# Patient Record
Sex: MALE | Race: WHITE | NOT HISPANIC OR LATINO | Employment: OTHER | URBAN - NONMETROPOLITAN AREA
[De-identification: names, ages, dates, MRNs, and addresses within clinical notes are randomized per-mention and may not be internally consistent; named-entity substitution may affect disease eponyms.]

---

## 2017-03-07 ENCOUNTER — TRANSCRIBE ORDERS (OUTPATIENT)
Dept: ADMINISTRATIVE | Facility: HOSPITAL | Age: 67
End: 2017-03-07

## 2017-03-07 DIAGNOSIS — M25.511 ACUTE PAIN OF RIGHT SHOULDER: Primary | ICD-10-CM

## 2017-03-09 ENCOUNTER — HOSPITAL ENCOUNTER (OUTPATIENT)
Dept: CT IMAGING | Facility: HOSPITAL | Age: 67
Discharge: HOME | End: 2017-03-09
Admitting: NURSE PRACTITIONER

## 2017-03-09 DIAGNOSIS — M25.511 ACUTE PAIN OF RIGHT SHOULDER: ICD-10-CM

## 2017-03-09 PROCEDURE — 73200 CT UPPER EXTREMITY W/O DYE: CPT

## 2017-03-27 ENCOUNTER — HOSPITAL ENCOUNTER (OUTPATIENT)
Dept: CT IMAGING | Age: 67
Discharge: HOME | End: 2017-03-27
Payer: MEDICARE

## 2017-03-27 ENCOUNTER — HOSPITAL ENCOUNTER (OUTPATIENT)
Dept: GENERAL RADIOLOGY | Age: 67
Discharge: HOME | End: 2017-03-27
Payer: MEDICARE

## 2017-03-27 DIAGNOSIS — Z98.1 ARTHRODESIS STATUS: ICD-10-CM

## 2017-03-27 DIAGNOSIS — M25.511 PAIN IN SHOULDER REGION, RIGHT: ICD-10-CM

## 2017-03-31 ENCOUNTER — HOSPITAL ENCOUNTER (OUTPATIENT)
Dept: GENERAL RADIOLOGY | Age: 67
Discharge: HOME | End: 2017-03-31
Payer: MEDICARE

## 2017-03-31 ENCOUNTER — HOSPITAL ENCOUNTER (OUTPATIENT)
Dept: CT IMAGING | Age: 67
Discharge: HOME | End: 2017-03-31
Payer: MEDICARE

## 2017-03-31 DIAGNOSIS — M25.511 ACUTE PAIN OF RIGHT SHOULDER: ICD-10-CM

## 2017-03-31 DIAGNOSIS — M25.511 RIGHT SHOULDER PAIN, UNSPECIFIED CHRONICITY: ICD-10-CM

## 2017-03-31 PROCEDURE — 77002 NEEDLE LOCALIZATION BY XRAY: CPT

## 2017-03-31 PROCEDURE — 23350 INJECTION FOR SHOULDER X-RAY: CPT

## 2017-03-31 PROCEDURE — 73200 CT UPPER EXTREMITY W/O DYE: CPT

## 2017-05-16 ENCOUNTER — TRANSCRIBE ORDERS (OUTPATIENT)
Dept: PHYSICAL THERAPY | Facility: HOSPITAL | Age: 67
End: 2017-05-16

## 2017-05-16 DIAGNOSIS — M19.011 PRIMARY OSTEOARTHRITIS OF RIGHT SHOULDER: ICD-10-CM

## 2017-05-16 DIAGNOSIS — M75.21 BICIPITAL TENDINITIS OF RIGHT SHOULDER: Primary | ICD-10-CM

## 2017-05-24 ENCOUNTER — HOSPITAL ENCOUNTER (OUTPATIENT)
Dept: PHYSICAL THERAPY | Facility: HOSPITAL | Age: 67
Setting detail: THERAPIES SERIES
Discharge: HOME | End: 2017-05-24

## 2017-05-24 DIAGNOSIS — M19.011 PRIMARY OSTEOARTHRITIS, RIGHT SHOULDER: ICD-10-CM

## 2017-05-24 DIAGNOSIS — M75.21 BICEPS TENDINITIS OF RIGHT SHOULDER: Primary | ICD-10-CM

## 2017-05-24 PROCEDURE — 97110 THERAPEUTIC EXERCISES: CPT | Performed by: PHYSICAL THERAPIST

## 2017-05-24 PROCEDURE — G8984 CARRY CURRENT STATUS: HCPCS | Performed by: PHYSICAL THERAPIST

## 2017-05-24 PROCEDURE — G8985 CARRY GOAL STATUS: HCPCS | Performed by: PHYSICAL THERAPIST

## 2017-05-24 PROCEDURE — 97161 PT EVAL LOW COMPLEX 20 MIN: CPT | Performed by: PHYSICAL THERAPIST

## 2017-05-30 ENCOUNTER — HOSPITAL ENCOUNTER (OUTPATIENT)
Dept: PHYSICAL THERAPY | Facility: HOSPITAL | Age: 67
Setting detail: THERAPIES SERIES
Discharge: HOME | End: 2017-05-30

## 2017-05-30 DIAGNOSIS — M75.21 BICEPS TENDINITIS OF RIGHT SHOULDER: Primary | ICD-10-CM

## 2017-05-30 DIAGNOSIS — M19.011 PRIMARY OSTEOARTHRITIS, RIGHT SHOULDER: ICD-10-CM

## 2017-05-30 PROCEDURE — 97110 THERAPEUTIC EXERCISES: CPT | Performed by: PHYSICAL THERAPIST

## 2017-05-30 PROCEDURE — 97140 MANUAL THERAPY 1/> REGIONS: CPT | Performed by: PHYSICAL THERAPIST

## 2017-06-01 ENCOUNTER — HOSPITAL ENCOUNTER (OUTPATIENT)
Dept: PHYSICAL THERAPY | Facility: HOSPITAL | Age: 67
Setting detail: THERAPIES SERIES
Discharge: HOME | End: 2017-06-01

## 2017-06-01 DIAGNOSIS — M19.011 PRIMARY OSTEOARTHRITIS, RIGHT SHOULDER: ICD-10-CM

## 2017-06-01 DIAGNOSIS — M75.21 BICEPS TENDINITIS OF RIGHT SHOULDER: Primary | ICD-10-CM

## 2017-06-01 PROCEDURE — 97110 THERAPEUTIC EXERCISES: CPT

## 2017-06-01 NOTE — THERAPY TREATMENT NOTE
Outpatient Physical Therapy Ortho Treatment Note  [facility]  Julia Reyes PTA  17  4:37 PM       Patient Name: Emerson Sofia  : 1950  MRN: [id number]  Today's Date: 2017      Visit Date: 2017  Subjective Improvement: 0%      Attendance: 3/3  Approved: medicare cap           MD follow up: 5 weeks            RC date:     17    Visit Dx:    ICD-10-CM ICD-9-CM   1. Biceps tendinitis of right shoulder M75.21 726.12   2. Primary osteoarthritis, right shoulder M19.011 715.11       There is no problem list on file for this patient.       Past Medical History:   Diagnosis Date   • High cholesterol         Past Surgical History:   Procedure Laterality Date   • PACEMAKER IMPLANTATION               PT Ortho       17 1600    Right Shoulder    Flexion AROM Deficit 162°  -ARA      17 1000    Precautions and Contraindications    Contraindications Pacemaker: No IFC  -DD    Subjective Pain    Post-Treatment Pain Level 3  -DD      User Key  (r) = Recorded By, (t) = Taken By, (c) = Cosigned By    Initials Name Provider Type    DD Daphnie Smith, PT Physical Therapist    ARA Julia Reyes PTA Physical Therapy Assistant                            PT Assessment/Plan       17 1600       PT Assessment    Functional Limitations Limitation in home management;Performance in self-care ADL;Performance in leisure activities  -     Impairments Range of motion;Muscle strength;Pain;Posture  -     Assessment Comments pt tolerated joint mobes well. Some pain at end ranges with PROM today.   -ARA     Rehab Potential Excellent  -     Patient/caregiver participated in establishment of treatment plan and goals Yes  -ARA     Patient would benefit from skilled therapy intervention Yes  -ARA     PT Plan    PT Frequency 2x/week  -ARA     Predicted Duration of Therapy Intervention (days/wks) 3 weeks  -ARA     PT Plan Comments Add Mid rows and shoulder ext to HEP next visit.   -ARA       User Key  (r) = Recorded By, (t) = Taken By, (c) = Cosigned By    Initials Name Provider Type    ARA ReddyJulia KYLER ANALIA Reyes Physical Therapy Assistant                Modalities       06/01/17 1600          Ice    Ice Applied Yes  -ARA      Location R Shoulder  -ARA      Rx Minutes 15 mins  -ARA      Ice S/P Rx Yes  -ARA        User Key  (r) = Recorded By, (t) = Taken By, (c) = Cosigned By    Initials Name Provider Type    ARA Julia CHÁVEZ ANALIA Reyes Physical Therapy Assistant                Exercises       06/01/17 1600          Subjective Comments    Subjective Comments pt reports that he doesn't have a problem with his motion at this time- his shoulder just hurts when he moves it a certain way.  -ARA      Subjective Pain    Able to rate subjective pain? yes  -ARA      Pre-Treatment Pain Level 0  -ARA      Post-Treatment Pain Level 0  -ARA      Aquatics    Aquatics performed? No  -ARA      Exercise 1    Exercise Name 1 PRO II for ROM  -ARA      Resistance 1 --   L25.  -ARA      Time (Minutes) 1 8 min  -ARA      Exercise 2    Exercise Name 2 Pulley's FF/ABD  -ARA      Time (Minutes) 2 2 min each  -ARA      Exercise 3    Exercise Name 3 Wall wipes FF/abd  -ARA      Sets 3 2  -ARA      Reps 3 10  -ARA      Exercise 4    Exercise Name 4 Doorway stretch low  -ARA      Reps 4 3  -ARA      Time (Seconds) 4 30 sec hold  -ARA      Exercise 5    Exercise Name 5 Tband: mid row  -ARA      Equipment 5 Theraband  -ARA      Resistance 5 Red  -ARA      Sets 5 2  -ARA      Reps 5 10  -ARA      Exercise 6    Exercise Name 6 Tband: shoulder extension  -ARA      Equipment 6 Theraband  -ARA      Resistance 6 Red  -ARA      Sets 6 2  -ARA      Reps 6 10  -ARA      Exercise 7    Exercise Name 7 Press downs  -ARA      Sets 7 2  -ARA      Reps 7 10  -ARA      Exercise 8    Exercise Name 8 Shoulder isometrics: Add, flexion, ext  -ARA      Reps 8 10   each  -ARA      Time (Seconds) 8 5 sec hold  -ARA        User Key  (r) = Recorded By, (t) = Taken By, (c) = Cosigned  By    Initials Name Provider Type    ARA Reyes PTA Physical Therapy Assistant                        Manual Rx (last 36 hours)      Manual Treatments       06/01/17 1500          Manual Rx 1    Manual Rx 1 Location R shoulder  -      Manual Rx 1 Type PROM/ GH jt mobes  -      Manual Rx 1 Duration 8 min  -        User Key  (r) = Recorded By, (t) = Taken By, (c) = Cosigned By    Initials Name Provider Type    ARA Reyes PTA Physical Therapy Assistant                PT OP Goals       06/01/17 1600       PT Short Term Goals    STG Date to Achieve 06/07/17  -     STG 1 Indep in HEP  -     STG 1 Progress Ongoing  -     STG 2 Tolerate 45 min treatment session without increased pain  -     STG 2 Progress Ongoing  -     STG 3 Demonstrate R shoulder flex AROM to 165 deg or greater  -     STG 3 Progress Ongoing  -     STG 4 Demonstrate R should abd AROM to 160 deg or greater  -     STG 4 Progress [facility]     Long Term Goals    LTG Date to Achieve 06/14/17  -     LTG 1 Subjectively report 65% improvement or greater  -     LTG 1 Progress Ongoing  -     LTG 2 QuickDASH score to 25% or less  -     LTG 2 Progress Ongoing  -     LTG 3 Demonstrate R shoulder flex/abd MMT to 5/5  -     LTG 3 Progress Ongoing  -     LTG 4 Demonstrate R shoulder ER MMT to 4+/5 or greater  -     LTG 4 Progress Ongoing  -     LTG 5 Demonstrate R  strength 95# or greater  -     LTG 5 Progress Ongoing  -     LTG 6 No pain with full R shoulder flex/abd AROM  -     LTG 6 Progress Ongoing  [facility]     Time Calculation    PT Goal Re-Cert Due Date 06/14/17   Visits: 3/3 MD: 5 weeks  Improvement: 0%  -       User Key  (r) = Recorded By, (t) = Taken By, (c) = Cosigned By    Initials Name Provider Type    ARA Reyes PTA Physical Therapy Assistant                    Time Calculation:   Start Time: 1515  Stop Time: 1620  Time Calculation (min): 65 min  Total Timed Code Minutes-  PT: 50 minute(s)    Therapy Charges for Today     Code Description Service Date Service Provider Modifiers Qty    90888858839 HC PT THER PROC EA 15 MIN 6/1/2017 Julia Reyes, PTA GP 3    44229654291 HC PT THER SUPP EA 15 MIN 6/1/2017 Julia Reyes, PTA GP 1                    Julia Reyes, PTA  6/1/2017

## 2017-06-05 ENCOUNTER — HOSPITAL ENCOUNTER (OUTPATIENT)
Dept: PHYSICAL THERAPY | Facility: HOSPITAL | Age: 67
Setting detail: THERAPIES SERIES
Discharge: HOME | End: 2017-06-05

## 2017-06-05 DIAGNOSIS — M19.011 PRIMARY OSTEOARTHRITIS, RIGHT SHOULDER: ICD-10-CM

## 2017-06-05 DIAGNOSIS — M75.21 BICEPS TENDINITIS OF RIGHT SHOULDER: Primary | ICD-10-CM

## 2017-06-05 PROCEDURE — 97110 THERAPEUTIC EXERCISES: CPT

## 2017-06-05 NOTE — THERAPY TREATMENT NOTE
Outpatient Physical Therapy Ortho Treatment Note  [facility]  Julia Reyes PTA  17  8:52 AM       Patient Name: Emerson Sofia  : 1950  MRN: [id number]  Today's Date: 2017      Visit Date: 2017  Subjective Improvement: 10-15%       Attendance:   Approved: medicare guidelines           MD follow up:             date: 17          Visit Dx:    ICD-10-CM ICD-9-CM   1. Biceps tendinitis of right shoulder M75.21 726.12   2. Primary osteoarthritis, right shoulder M19.011 715.11       There is no problem list on file for this patient.       Past Medical History:   Diagnosis Date   • High cholesterol         Past Surgical History:   Procedure Laterality Date   • PACEMAKER IMPLANTATION               PT Ortho       17 0800    Precautions and Contraindications    Contraindications Pacemaker: No IFC  -ARA    Subjective Pain    Able to rate subjective pain? yes  -ARA    Pre-Treatment Pain Level 1  -ARA    Right Shoulder    Flexion AROM Deficit Supine °  -      User Key  (r) = Recorded By, (t) = Taken By, (c) = Cosigned By    Initials Name Provider Type     Julia Reyes PTA Physical Therapy Assistant                            PT Assessment/Plan       17 0800       PT Assessment    Functional Limitations Limitation in home management;Performance in self-care ADL;Performance in leisure activities  -     Impairments Range of motion;Muscle strength;Pain;Posture  -     Assessment Comments pt limited with PROM IR but nearing full PROM in all other directions. Good form noted with tband exercises- minimal cueing required. Added tband exercises to HEP.  -     Rehab Potential Excellent  -     Patient/caregiver participated in establishment of treatment plan and goals Yes  -ARA     Patient would benefit from skilled therapy intervention Yes  -ARA     PT Plan    PT Frequency 2x/week  -ARA     Predicted Duration of Therapy Intervention  (days/wks) 3 weeks  -ARA     PT Plan Comments No moneys  -ARA       User Key  (r) = Recorded By, (t) = Taken By, (c) = Cosigned By    Initials Name Provider Type    ARA Reyes PTA Physical Therapy Assistant                Modalities       06/05/17 0800          Ice    Ice Applied Yes  -ARA      Location R Shoulder  -ARA      Rx Minutes 15 mins  -ARA      Ice S/P Rx Yes  -ARA        User Key  (r) = Recorded By, (t) = Taken By, (c) = Cosigned By    Initials Name Provider Type    ARA Reyes PTA Physical Therapy Assistant                Exercises       06/05/17 0800          Subjective Comments    Subjective Comments pt reports that he is just having a little pain this morning.  -ARA      Subjective Pain    Able to rate subjective pain? yes  -ARA      Pre-Treatment Pain Level 1  -ARA      Post-Treatment Pain Level 0  -ARA      Aquatics    Aquatics performed? No  -ARA      Exercise 1    Exercise Name 1 PRO II for ROM  -ARA      Resistance 1 --   L3.0.  -ARA      Time (Minutes) 1 10 min  -ARA      Exercise 2    Exercise Name 2 Pulley's FF/ABD  -ARA      Time (Minutes) 2 2 min each  -ARA      Exercise 3    Exercise Name 3 Wall wipes FF/abd  -ARA      Sets 3 2  -ARA      Reps 3 10  -ARA      Exercise 4    Exercise Name 4 Doorway stretch low  -ARA      Reps 4 3  -ARA      Time (Seconds) 4 30 sec hold  -ARA      Exercise 5    Exercise Name 5 Tband: mid row  -ARA      Equipment 5 Theraband  -ARA      Resistance 5 Red  -ARA      Sets 5 2  -ARA      Reps 5 10  -ARA      Exercise 6    Exercise Name 6 Tband: shoulder extension  -ARA      Equipment 6 Theraband  -ARA      Resistance 6 Red  -ARA      Sets 6 2  -ARA      Reps 6 10  -ARA      Exercise 7    Exercise Name 7 Supine Serratus punches  -ARA      Sets 7 2  -ARA      Reps 7 10  -ARA      Exercise 8    Exercise Name 8 Shoulder isometrics: Add, flexion, ext  -ARA      Reps 8 10  -ARA      Exercise 9    Exercise Name 9 Supine AA wand flexion  -ARA      Exercise 10    Exercise Name 10 OH  circles cw/ccw  -ARA      Reps 10 20  -        User Key  (r) = Recorded By, (t) = Taken By, (c) = Cosigned By    Initials Name Provider Type    ARA Reyes PTA Physical Therapy Assistant                        Manual Rx (last 36 hours)      Manual Treatments       06/05/17 0700          Manual Rx 1    Manual Rx 1 Location R shoulder  -ARA      Manual Rx 1 Type PROM/ GH jt mobes  -      Manual Rx 1 Duration 8 min  -        User Key  (r) = Recorded By, (t) = Taken By, (c) = Cosigned By    Initials Name Provider Type    ARA Reyes PTA Physical Therapy Assistant                PT OP Goals       06/05/17 0800       PT Short Term Goals    STG Date to Achieve 06/07/17  -     STG 1 Indep in HEP  -     STG 1 Progress Ongoing  -     STG 2 Tolerate 45 min treatment session without increased pain  -     STG 2 Progress Ongoing  -     STG 3 Demonstrate R shoulder flex AROM to 165 deg or greater  -     STG 3 Progress Ongoing  -     STG 4 Demonstrate R should abd AROM to 160 deg or greater  -     STG 4 [facility]     Long Term Goals    LTG Date to Achieve 06/14/17  -     LTG 1 Subjectively report 65% improvement or greater  -     LTG 1 Progress Ongoing  -     LTG 2 QuickDASH score to 25% or less  -     LTG 2 Progress Ongoing  -     LTG 3 Demonstrate R shoulder flex/abd MMT to 5/5  -     LTG 3 Progress Ongoing  -     LTG 4 Demonstrate R shoulder ER MMT to 4+/5 or greater  -     LTG 4 Progress Ongoing  -     LTG 5 Demonstrate R  strength 95# or greater  -     LTG 5 Progress Ongoing  -     LTG 6 No pain with full R shoulder flex/abd AROM  -     LTG 6 [facility]     Time Calculation    PT Goal Re-Cert Due Date 06/14/17   Visits: 4/4 MD: 5 weeks Improvement: 10-15%  -       User Key  (r) = Recorded By, (t) = Taken By, (c) = Cosigned By    Initials Name Provider Type    ARA Reyes PTA Physical Therapy Assistant                 Therapy Education       06/05/17 0800          Therapy Education    Given HEP;Symptoms/condition management;Pain management  -ARA      Program New   Tband: mid row and shoulder ext  -ARA      How Provided Verbal;Demonstration;Written  -ARA      Provided to Patient  -ARA      Level of Understanding Teach back education performed;Verbalized;Demonstrated  -ARA        User Key  (r) = Recorded By, (t) = Taken By, (c) = Cosigned By    Initials Name Provider Type    ARA Reyes PTA Physical Therapy Assistant                Time Calculation:   Start Time: 0800  Stop Time: 0900  Time Calculation (min): 60 min  Total Timed Code Minutes- PT: 45 minute(s)    Therapy Charges for Today     Code Description Service Date Service Provider Modifiers Qty    51251430700 HC PT THER PROC EA 15 MIN 6/5/2017 Julia Reyes PTA GP 3    76462078048 HC PT THER SUPP EA 15 MIN 6/5/2017 Julia Reyes PTA GP 1                    Julia Reyes PTA  6/5/2017

## 2017-06-08 ENCOUNTER — HOSPITAL ENCOUNTER (OUTPATIENT)
Dept: PHYSICAL THERAPY | Facility: HOSPITAL | Age: 67
Setting detail: THERAPIES SERIES
Discharge: HOME | End: 2017-06-08

## 2017-06-08 DIAGNOSIS — M75.21 BICEPS TENDINITIS OF RIGHT SHOULDER: Primary | ICD-10-CM

## 2017-06-08 DIAGNOSIS — M19.011 PRIMARY OSTEOARTHRITIS, RIGHT SHOULDER: ICD-10-CM

## 2017-06-08 PROCEDURE — 97110 THERAPEUTIC EXERCISES: CPT

## 2017-06-08 PROCEDURE — 97140 MANUAL THERAPY 1/> REGIONS: CPT

## 2017-06-08 NOTE — THERAPY TREATMENT NOTE
Outpatient Physical Therapy Ortho Treatment Note  [facility]     Patient Name: Emerson Sofia  : 1950  MRN: [id number]  Today's Date: 2017      Visit Date: 2017     Subjective Improvement: 15%      Attendance:    Approved: Medicare Cap           MD follow up:             date: 17           Visit Dx:    ICD-10-CM ICD-9-CM   1. Biceps tendinitis of right shoulder M75.21 726.12   2. Primary osteoarthritis, right shoulder M19.011 715.11       There is no problem list on file for this patient.       Past Medical History:   Diagnosis Date   • High cholesterol         Past Surgical History:   Procedure Laterality Date   • PACEMAKER IMPLANTATION               PT Ortho       17 0700    Subjective Comments    Subjective Comments Pt states that he is feeling pretty good this morning, and denies pain.   -MB    Precautions and Contraindications    Contraindications Pacemaker: No IFC  -MB    Subjective Pain    Able to rate subjective pain? yes  -MB    Pre-Treatment Pain Level 0  -MB    Post-Treatment Pain Level 0  -MB    Right Shoulder    Flexion AROM Deficit 158°  -MB    ABduction AROM Deficit 150°  -MB    External Rotation AROM Deficit 55°  -MB    Internal Rotation AROM Deficit T9  -MB      User Key  (r) = Recorded By, (t) = Taken By, (c) = Cosigned By    Initials Name Provider Type    JULIANNA Colin PTA Physical Therapy Assistant                            PT Assessment/Plan       17 0800       PT Assessment    Functional Limitations Limitation in home management;Performance in self-care ADL;Performance in leisure activities  -MB     Impairments Range of motion;Muscle strength;Pain;Posture  -MB     Assessment Comments Pt demos increase in AROM R shoulder flex/abd/IR/ER today. Pt has slight discomfort with No $ but does subside with rest.   -MB     Rehab Potential Excellent  -MB     Patient/caregiver participated in establishment of treatment plan and  goals Yes  -MB     Patient would benefit from skilled therapy intervention Yes  -MB     PT Plan    PT Frequency 2x/week  -MB     Predicted Duration of Therapy Intervention (days/wks) 3 weeks  -MB     PT Plan Comments Continue shoulder/scap strength and stability.   -MB       User Key  (r) = Recorded By, (t) = Taken By, (c) = Cosigned By    Initials Name Provider Type    JULIANNA Colin PTA Physical Therapy Assistant                Modalities       06/08/17 0700          Ice    Ice Applied Yes  -MB      Location R Shoulder  -MB      Rx Minutes 15 mins  -MB      Ice S/P Rx Yes  -MB        User Key  (r) = Recorded By, (t) = Taken By, (c) = Cosigned By    Initials Name Provider Type    JULIANNA Colin PTA Physical Therapy Assistant                Exercises       06/08/17 0700          Subjective Comments    Subjective Comments Pt states that he is feeling pretty good this morning, and denies pain.   -MB      Subjective Pain    Able to rate subjective pain? yes  -MB      Pre-Treatment Pain Level 0  -MB      Post-Treatment Pain Level 0  -MB      Aquatics    Aquatics performed? No  -MB      Exercise 1    Exercise Name 1 PRO II for ROM  -MB      Resistance 1 --   3.5  -MB      Time (Minutes) 1 10 min  -MB      Exercise 2    Exercise Name 2 Pulley's FF/ABD  -MB      Time (Minutes) 2 2 min each  -MB      Exercise 3    Exercise Name 3 Wall ABCs  -MB      Sets 3 1  -MB      Reps 3 1  -MB      Exercise 4    Exercise Name 4 Tband Rows  -MB      Equipment 4 Theraband  -MB      Resistance 4 Red  -MB      Sets 4 2  -MB      Reps 4 15  -MB      Exercise 5    Exercise Name 5 Tband shoulder ext  -MB      Equipment 5 Theraband  -MB      Resistance 5 Red  -MB      Sets 5 2  -MB      Reps 5 10  -MB      Exercise 6    Exercise Name 6 No $  -MB      Equipment 6 Theraband  -MB      Resistance 6 Red  -MB      Sets 6 2  -MB      Reps 6 10  -MB      Exercise 7    Exercise Name 7 Wall pushups   -MB      Sets 7 2  -MB      Reps 7 10   -MB      Exercise 8    Exercise Name 8 Standing Wand Flex/abd  -MB      Sets 8 2  -MB      Reps 8 10  -MB      Exercise 9    Exercise Name 9 Serratus Punch  -MB      Weights/Plates 9 3  -MB      Sets 9 2  -MB      Reps 9 10  -MB        User Key  (r) = Recorded By, (t) = Taken By, (c) = Cosigned By    Initials Name Provider Type    JULIANNA Colin PTA Physical Therapy Assistant                        Manual Rx (last 36 hours)      Manual Treatments       06/08/17 0700          Manual Rx 1    Manual Rx 1 Location R shoulder  -MB      Manual Rx 1 Type PROM/ GH jt mobes  -MB      Manual Rx 1 Duration 8 min  -MB        User Key  (r) = Recorded By, (t) = Taken By, (c) = Cosigned By    Initials Name Provider Type    JULIANNA Colin PTA Physical Therapy Assistant                PT OP Goals       06/08/17 0800       PT Short Term Goals    STG Date to Achieve 06/07/17  -MB     STG 1 Indep in HEP  -MB     STG 1 Progress Ongoing  -MB     STG 2 Tolerate 45 min treatment session without increased pain  -MB     STG 2 Progress Met  -MB     STG 3 Demonstrate R shoulder flex AROM to 165 deg or greater  -MB     STG 3 Progress Ongoing  -MB     STG 4 Demonstrate R should abd AROM to 160 deg or greater  -MB     STG 4 Progress Ongoing  -MB     Long Term Goals    LTG Date to Achieve 06/14/17  -MB     LTG 1 Subjectively report 65% improvement or greater  -MB     LTG 1 Progress Ongoing  -MB     LTG 2 QuickDASH score to 25% or less  -MB     LTG 2 Progress Ongoing  -MB     LTG 3 Demonstrate R shoulder flex/abd MMT to 5/5  -MB     LTG 3 Progress Ongoing  -MB     LTG 4 Demonstrate R shoulder ER MMT to 4+/5 or greater  -MB     LTG 4 Progress Ongoing  -MB     LTG 5 Demonstrate R  strength 95# or greater  -MB     LTG 5 Progress Ongoing  -MB     LTG 6 No pain with full R shoulder flex/abd AROM  -MB     LTG 6 Progress Ongoing  -MB     Time Calculation    PT Goal Re-Cert Due Date 06/14/17   MD Serafin 5 weeks, Improvement 15%  -MB        User Key  (r) = Recorded By, (t) = Taken By, (c) = Cosigned By    Initials Name Provider Type    JULIANNA Colin PTA Physical Therapy Assistant                Therapy Education       06/08/17 0800          Therapy Education    Given HEP;Symptoms/condition management;Pain management  -MB      Program Reinforced  -MB      How Provided Verbal;Demonstration;Written  -MB      Provided to Patient  -MB      Level of Understanding Teach back education performed;Verbalized;Demonstrated  -MB        User Key  (r) = Recorded By, (t) = Taken By, (c) = Cosigned By    Initials Name Provider Type    JULIANNA Colin PTA Physical Therapy Assistant                Time Calculation:   Start Time: 0756  Stop Time: 0859  Time Calculation (min): 63 min  Total Timed Code Minutes- PT: 48 minute(s)    Therapy Charges for Today     Code Description Service Date Service Provider Modifiers Qty    80825702998 HC PT THER PROC EA 15 MIN 6/8/2017 Filemon Colin PTA GP 2    48673397279 HC PT MANUAL THERAPY EA 15 MIN 6/8/2017 Filemon Colin PTA GP 1    83646048854 HC PT THER SUPP EA 15 MIN 6/8/2017 Filemon Colin PTA GP 1                    Filemon Colin PTA  6/8/2017

## 2017-06-12 ENCOUNTER — HOSPITAL ENCOUNTER (OUTPATIENT)
Dept: PHYSICAL THERAPY | Facility: HOSPITAL | Age: 67
Setting detail: THERAPIES SERIES
Discharge: HOME | End: 2017-06-12

## 2017-06-12 DIAGNOSIS — M75.21 BICEPS TENDINITIS OF RIGHT SHOULDER: Primary | ICD-10-CM

## 2017-06-12 DIAGNOSIS — M19.011 PRIMARY OSTEOARTHRITIS, RIGHT SHOULDER: ICD-10-CM

## 2017-06-12 PROCEDURE — G8985 CARRY GOAL STATUS: HCPCS | Performed by: PHYSICAL THERAPIST

## 2017-06-12 PROCEDURE — G8984 CARRY CURRENT STATUS: HCPCS | Performed by: PHYSICAL THERAPIST

## 2017-06-12 PROCEDURE — 97110 THERAPEUTIC EXERCISES: CPT | Performed by: PHYSICAL THERAPIST

## 2017-06-12 NOTE — THERAPY RE-EVALUATION
Outpatient Physical Therapy Ortho Progress Note  [facility]       Patient Name: Emerson Sofia  : 1950  MRN: [id number]  Today's Date: 2017      Visit Date: 2017     Subjective Improvement: 50%      Attendance:   Approved:  Medicare Guidelines         MD follow up:   End of  weeks        RC date:    7/3/17       There is no problem list on file for this patient.       Past Medical History:   Diagnosis Date   • High cholesterol         Past Surgical History:   Procedure Laterality Date   • PACEMAKER IMPLANTATION         Visit Dx:     ICD-10-CM ICD-9-CM   1. Biceps tendinitis of right shoulder M75.21 726.12   2. Primary osteoarthritis, right shoulder M19.011 715.11                 PT Ortho       17 0800    Precautions and Contraindications    Contraindications Pacemaker: No IFC  -KG    Posture/Observations    Posture/Observations Comments Pt shows no signs of acute distress. Rounded shoulders posture noted.  -KG    Sensation    Sensation WNL? WNL  -KG    Light Touch No apparent deficits  -KG    Special Tests/Palpation    Special Tests/Palpation Shoulder  -KG    Shoulder Girdle Palpation    Shoulder Girdle Palpation? Yes   Pt denies TTP at shoulder girlde this date  -KG    Right Shoulder    Flexion AROM Deficit 165 deg  -KG    ABduction AROM Deficit 152 deg  -KG    External Rotation AROM Deficit 55 deg  -KG    Internal Rotation AROM Deficit Functional reach to T9  -KG    Right Shoulder    Flexion Gross Movement (4+/5) good plus  -KG    ABduction Gross Movement (4+/5) good plus  -KG    Int Rotation Gross Movement (4/5) good  -KG    Ext Rotation Gross Movement (4-/5) good minus  -KG    Right Elbow/Forearm    Elbow Flexion Gross Movement (4+/5) good plus  -KG    Elbow Extension Gross Movement (4+/5) good plus  -KG      User Key  (r) = Recorded By, (t) = Taken By, (c) = Cosigned By    Initials Name Provider Type    THUY Negro, PT Physical Therapist                             Therapy Education       06/12/17 0800          Therapy Education    Given HEP;Symptoms/condition management  -KG      Program Reinforced  -KG      How Provided Verbal  -KG      Provided to Patient  -KG      Level of Understanding Verbalized;Demonstrated  -KG        User Key  (r) = Recorded By, (t) = Taken By, (c) = Cosigned By    Initials Name Provider Type    THUY Negro, PT Physical Therapist                PT OP Goals       06/12/17 0800       PT Short Term Goals    STG Date to Achieve 07/03/17  -KG     STG 1 Indep in HEP  -KG     STG 1 Progress Ongoing  -KG     STG 2 Tolerate 45 min treatment session without increased pain  -KG     STG 2 Progress Met  -KG     STG 3 Demonstrate R shoulder flex AROM to 165 deg or greater  -KG     STG 3 Progress Met  -KG     STG 4 Demonstrate R should abd AROM to 160 deg or greater  -KG     STG 4 Progress Ongoing;Progressing  -KG     Long Term Goals    LTG Date to Achieve 07/03/17  -KG     LTG 1 Subjectively report 65% improvement or greater  -KG     LTG 1 Progress Ongoing;Progressing  -KG     LTG 2 QuickDASH score to 25% or less  -KG     LTG 2 Progress Ongoing;Progressing  -KG     LTG 3 Demonstrate R shoulder flex/abd MMT to 5/5  -KG     LTG 3 Progress Ongoing;Progressing  -KG     LTG 4 Demonstrate R shoulder ER MMT to 4+/5 or greater  -KG     LTG 4 Progress Ongoing;Progressing  -KG     LTG 5 Demonstrate R  strength 95# or greater  -KG     LTG 5 Progress Ongoing;Progressing  -KG     LTG 6 No pain with full R shoulder flex/abd AROM  -KG     LTG 6 Progress Ongoing;Progressing  -KG     LTG 7 Be able to lift a gallon of milk/8# without increased pain in shoulder  -KG     LTG 7 Progress New  -KG     Time Calculation    PT Goal Re-Cert Due Date 07/03/17   Visit 6/6. MD 2 weeks, 50% improvement  -KG       User Key  (r) = Recorded By, (t) = Taken By, (c) = Cosigned By    Initials Name Provider Type    THUY Negro PT Physical Therapist                 PT Assessment/Plan       06/12/17 0800       PT Assessment    Functional Limitations Limitation in home management;Performance in self-care ADL;Performance in leisure activities  -KG     Impairments Range of motion;Muscle strength;Pain;Posture  -KG     Assessment Comments Pt progressing well with PT at this time. Demonstrates increased in shoulder flex/abd AROM this date. Continues to have pain/tightness with shoulder ER at this time, as well as noticeble weakness. Overall demonstrates improvements in shoulder strength but continues to be limited. PROM WNL in all planes of shoulder this date. Increased overall subjective improvement reported this date. Pt will continue to benefit from skilled PT services to futher improve functional strength/endurance.  -KG     Rehab Potential Excellent  -KG     Patient/caregiver participated in establishment of treatment plan and goals Yes  -KG     Patient would benefit from skilled therapy intervention Yes  -KG     PT Plan    PT Frequency 2x/week  -KG     Predicted Duration of Therapy Intervention (days/wks) 2-3 more weeks  -KG     PT Plan Comments Continue shoulder/RTC strengthening & scap stability. Progress AROM/PRE's as tolerated.  Test  strength next  -KG       User Key  (r) = Recorded By, (t) = Taken By, (c) = Cosigned By    Initials Name Provider Type    KG Erica Negro, PT Physical Therapist                Modalities       06/12/17 0800          Subjective Pain    Post-Treatment Pain Level 0  -KG      Ice    Ice Applied Yes  -KG      Location R Shoulder  -KG      Rx Minutes 15 mins  -KG      Ice S/P Rx Yes  -KG        User Key  (r) = Recorded By, (t) = Taken By, (c) = Cosigned By    Initials Name Provider Type    KG Erica Negro, PT Physical Therapist              Exercises       06/12/17 0800          Subjective Comments    Subjective Comments Pt states he slept on his shoulder wrong so it's a little sore this morning. States overall therapy has been  helping. States his motion has gotten much better. Reports that his pain is about 20% less than what it was when he started. States he will still have sharp pain with certain movements at his anterior shoulder every now & then. Reports increased pain/weakness trying to get milk out of the refridgerator. Reports 50% improvement at this time.  -KG      Subjective Pain    Able to rate subjective pain? yes  -KG      Pre-Treatment Pain Level 4  -KG      Post-Treatment Pain Level 0  -KG      Aquatics    Aquatics performed? No  -KG      Exercise 1    Exercise Name 1 PRO II for ROM/Endurance  -KG      Resistance 1 --   3.5  -KG      Time (Minutes) 1 10 min  -KG      Exercise 2    Exercise Name 2 Pulley's FF/ABD  -KG      Sets 2 1  -KG      Reps 2 20  -KG      Time (Minutes) 2 --  -KG      Exercise 3    Exercise Name 3 Tband Rows  -KG      Equipment 3 Theraband  -KG      Resistance 3 Green  -KG      Sets 3 2  -KG      Reps 3 15  -KG      Exercise 4    Exercise Name 4 Tband Shoulder Ext  -KG      Equipment 4 Theraband  -KG      Resistance 4 Green  -KG      Sets 4 2  -KG      Reps 4 15  -KG      Exercise 5    Exercise Name 5 Wall Push Ups  -KG      Sets 5 2  -KG      Reps 5 10  -KG      Exercise 6    Exercise Name 6 No Moneys  -KG      Equipment 6 Theraband  -KG      Resistance 6 Red  -KG      Sets 6 2  -KG      Reps 6 10  -KG      Exercise 7    Exercise Name 7 AROM Shoulder FF/scap/Abd to 90 deg  -KG      Sets 7 2  -KG      Reps 7 10  -KG      Exercise 8    Exercise Name 8 Supine Serratus Punches with Tbar  -KG      Weights/Plates 8 3  -KG      Sets 8 2  -KG      Reps 8 10  -KG      Exercise 9    Exercise Name 9 Sidelying ER  -KG      Sets 9 2  -KG      Reps 9 10  -KG        User Key  (r) = Recorded By, (t) = Taken By, (c) = Cosigned By    Initials Name Provider Type    KG Erica Negro PT Physical Therapist           Manual Rx (last 36 hours)      Manual Treatments       06/12/17 0800          Manual Rx 1    Manual Rx 1  Location R shoulder  -KG      Manual Rx 1 Type PROM/ GH jt mobes  -KG      Manual Rx 1 Duration 5 min  -KG        User Key  (r) = Recorded By, (t) = Taken By, (c) = Cosigned By    Initials Name Provider Type    THUY Negro PT Physical Therapist                            Outcome Measures       06/12/17 0800          Quick DASH    Open a tight or new jar. 3  -KG      Do heavy household chores (e.g., wash walls, wash floors) 2  -KG      Carry a shopping bag or briefcase 2  -KG      Wash your back 2  -KG      Use a knife to cut food 1  -KG      Recreational activities in which you take some force or impact through your arm, should or hand (e.g. golf, hammering, tennis, etc.) 3  -KG      During the past week, to what extent has your arm, shoulder, or hand problem interfered with your normal social activites with family, friends, neighbors or groups? 3  -KG      During the past week, were you limited in your work or other regular daily activities as a result of your arm, shoulder or hand problem? 3  -KG      Arm, Shoulder, or hand pain 3  -KG      Tingling (pins and needles) in your arm, shoulder, or hand 2  -KG      During the past week, how much difficulty have you had sleeping because of the pain in your arm, shoulder or hand? 3  -KG      Number of Questions Answered 11  -KG      Quick DASH Score 36.36  -KG      Functional Assessment    Outcome Measure Options Quick DASH  -KG        User Key  (r) = Recorded By, (t) = Taken By, (c) = Cosigned By    Initials Name Provider Type    THUY Negro PT Physical Therapist            Time Calculation:   Start Time: 0803  Stop Time: 0900  Time Calculation (min): 57 min  Total Timed Code Minutes- PT: 42 minute(s)     Therapy Charges for Today     Code Description Service Date Service Provider Modifiers Qty    15586717970 HC PT CARRY MOV HAND OBJ CURRENT 6/12/2017 Erica Negro, PT GP, CJ 1    70221062961 HC PT CARRY MOV HAND OBJ PROJECTED 6/12/2017 Erica PEDRO  Marky, PT GP, CI 1    16735989636 HC PT THER PROC EA 15 MIN 6/12/2017 Erica Negro, PT GP 3          PT G-Codes  PT Professional Judgement Used?: Yes  Outcome Measure Options: Quick DASH  Score: 36%  Functional Limitation: Carrying, moving and handling objects  Carrying, Moving and Handling Objects Current Status (): At least 20 percent but less than 40 percent impaired, limited or restricted  Carrying, Moving and Handling Objects Goal Status (): At least 1 percent but less than 20 percent impaired, limited or restricted         Erica Negro, PT  6/12/2017

## 2017-06-15 ENCOUNTER — HOSPITAL ENCOUNTER (OUTPATIENT)
Dept: PHYSICAL THERAPY | Facility: HOSPITAL | Age: 67
Setting detail: THERAPIES SERIES
Discharge: HOME | End: 2017-06-15

## 2017-06-15 DIAGNOSIS — M19.011 PRIMARY OSTEOARTHRITIS, RIGHT SHOULDER: ICD-10-CM

## 2017-06-15 DIAGNOSIS — M75.21 BICEPS TENDINITIS OF RIGHT SHOULDER: Primary | ICD-10-CM

## 2017-06-15 PROCEDURE — 97110 THERAPEUTIC EXERCISES: CPT

## 2017-06-15 NOTE — THERAPY TREATMENT NOTE
Outpatient Physical Therapy Ortho Treatment Note  [facility]     Patient Name: Emerson Sofia  : 1950  MRN: [id number]  Today's Date: 6/15/2017      Visit Date: 06/15/2017     Subjective Improvement: 50%      Attendance:   Approved: Medicare Cap          MD follow up: End             date: 7/3/17          Visit Dx:    ICD-10-CM ICD-9-CM   1. Biceps tendinitis of right shoulder M75.21 726.12   2. Primary osteoarthritis, right shoulder M19.011 715.11       There is no problem list on file for this patient.       Past Medical History:   Diagnosis Date   • High cholesterol         Past Surgical History:   Procedure Laterality Date   • PACEMAKER IMPLANTATION               PT Ortho       06/15/17 0800    Subjective Comments    Subjective Comments Pt reports that he is feeling a little better today. Therapy is helping.   -MB    Precautions and Contraindications    Contraindications Pacemaker: No IFC  -MB    Subjective Pain    Able to rate subjective pain? yes  -MB    Pre-Treatment Pain Level 3  -MB    Post-Treatment Pain Level 0  -MB    Right Shoulder    Flexion AROM Deficit 165°  -MB    ABduction AROM Deficit 156°  -MB    MMT (Manual Muscle Testing)    General MMT Assessment Detail R GS 85#, L GS 80#  -MB      User Key  (r) = Recorded By, (t) = Taken By, (c) = Cosigned By    Initials Name Provider Type    JULIANNA Colin PTA Physical Therapy Assistant                            PT Assessment/Plan       06/15/17 0800       PT Assessment    Functional Limitations Limitation in home management;Performance in self-care ADL;Performance in leisure activities  -MB     Impairments Range of motion;Muscle strength;Pain;Posture  -MB     Assessment Comments Small increase in R GS today. Pt progressing well at this time, ROM continues to get better overall. Pt does have slight increase in pain with Tband ER but subsides with rest.   -MB     Rehab Potential Excellent  -MB      Patient/caregiver participated in establishment of treatment plan and goals Yes  -MB     Patient would benefit from skilled therapy intervention Yes  -MB     PT Plan    PT Frequency 2x/week  -MB     Predicted Duration of Therapy Intervention (days/wks) 2-3 more weeks  -MB     PT Plan Comments Possible CC biceps/triceps.  -MB       User Key  (r) = Recorded By, (t) = Taken By, (c) = Cosigned By    Initials Name Provider Type    JULIANNA Colin PTA Physical Therapy Assistant                Modalities       06/15/17 0800          Ice    Ice Applied Yes  -MB      Location R Shoulder  -MB      Rx Minutes 15 mins  -MB      Ice S/P Rx Yes  -MB        User Key  (r) = Recorded By, (t) = Taken By, (c) = Cosigned By    Initials Name Provider Type    JULIANNA Colin PTA Physical Therapy Assistant                Exercises       06/15/17 0800          Subjective Comments    Subjective Comments Pt reports that he is feeling a little better today. Therapy is helping.   -MB      Subjective Pain    Able to rate subjective pain? yes  -MB      Pre-Treatment Pain Level 3  -MB      Post-Treatment Pain Level 0  -MB      Aquatics    Aquatics performed? No  -MB      Exercise 1    Exercise Name 1 PRO II for ROM/Endurance  -MB      Resistance 1 --   3.5  -MB      Time (Minutes) 1 10 min  -MB      Exercise 2    Exercise Name 2 Pulley's FF/ABD  -MB      Sets 2 1  -MB      Reps 2 20  -MB      Exercise 3    Exercise Name 3 Tband Rows/Ext  -MB      Equipment 3 Theraband  -MB      Resistance 3 Green  -MB      Sets 3 2  -MB      Reps 3 15  -MB      Exercise 4    Exercise Name 4 Tband IR/ER  -MB      Equipment 4 Theraband  -MB      Resistance 4 Red  -MB      Sets 4 2  -MB      Reps 4 15  -MB      Exercise 5    Exercise Name 5 Wall Push Ups  -MB      Sets 5 2  -MB      Reps 5 10  -MB      Exercise 6    Exercise Name 6 No Moneys  -MB      Equipment 6 Theraband  -MB      Resistance 6 Red  -MB      Sets 6 2  -MB      Reps 6 10  -MB      Exercise  7    Exercise Name 7 AROM Shoulder FF/scap/Abd to 90 deg  -MB      Sets 7 2  -MB      Reps 7 10  -MB      Exercise 8    Exercise Name 8 Supine Serratus Punches with Tbar  -MB      Weights/Plates 8 3  -MB      Sets 8 2  -MB      Reps 8 10  -MB      Exercise 9    Exercise Name 9 --  -MB      Sets 9 --  -MB      Reps 9 --  -MB        User Key  (r) = Recorded By, (t) = Taken By, (c) = Cosigned By    Initials Name Provider Type    JULIANNA Colin PTA Physical Therapy Assistant                        Manual Rx (last 36 hours)      Manual Treatments       06/15/17 0700          Manual Rx 1    Manual Rx 1 Location R shoulder  -MB      Manual Rx 1 Type PROM/ GH jt mobes  -MB      Manual Rx 1 Duration 5 min  -MB        User Key  (r) = Recorded By, (t) = Taken By, (c) = Cosigned By    Initials Name Provider Type    JULIANNA Colin PTA Physical Therapy Assistant                PT OP Goals       06/15/17 0800       PT Short Term Goals    STG Date to Achieve 07/03/17  -MB     STG 1 Indep in HEP  -MB     STG 1 Progress Ongoing  -MB     STG 2 Tolerate 45 min treatment session without increased pain  -MB     STG 2 Progress Met  -MB     STG 3 Demonstrate R shoulder flex AROM to 165 deg or greater  -MB     STG 3 Progress Met  -MB     STG 4 Demonstrate R should abd AROM to 160 deg or greater  -MB     STG 4 Progress Ongoing;Progressing  -MB     Long Term Goals    LTG Date to Achieve 07/03/17  -MB     LTG 1 Subjectively report 65% improvement or greater  -MB     LTG 1 Progress Ongoing;Progressing  -MB     LTG 2 QuickDASH score to 25% or less  -MB     LTG 2 Progress Ongoing;Progressing  -MB     LTG 3 Demonstrate R shoulder flex/abd MMT to 5/5  -MB     LTG 3 Progress Ongoing;Progressing  -MB     LTG 4 Demonstrate R shoulder ER MMT to 4+/5 or greater  -MB     LTG 4 Progress Ongoing;Progressing  -MB     LTG 5 Demonstrate R  strength 95# or greater  -MB     LTG 5 Progress Ongoing;Progressing  -MB     LTG 6 No pain with full R  shoulder flex/abd AROM  -MB     LTG 6 Progress Ongoing;Progressing  -MB     LTG 7 Be able to lift a gallon of milk/8# without increased pain in shoulder  -MB     LTG 7 Progress New  -MB     Time Calculation    PT Goal Re-Cert Due Date 07/03/17 7/7, MD 2 weeks, Improvement 50%  -MB       User Key  (r) = Recorded By, (t) = Taken By, (c) = Cosigned By    Initials Name Provider Type    JULIANNA Colin PTA Physical Therapy Assistant                Therapy Education       06/15/17 0800          Therapy Education    Given HEP;Symptoms/condition management  -MB      Program Reinforced  -MB      How Provided Verbal  -MB      Provided to Patient  -MB      Level of Understanding Verbalized;Demonstrated  -MB        User Key  (r) = Recorded By, (t) = Taken By, (c) = Cosigned By    Initials Name Provider Type    JULIANNA Colin PTA Physical Therapy Assistant                Time Calculation:   Start Time: 0800  Stop Time: 0901  Time Calculation (min): 61 min  Total Timed Code Minutes- PT: 46 minute(s)    Therapy Charges for Today     Code Description Service Date Service Provider Modifiers Qty    95114711729 HC PT THER PROC EA 15 MIN 6/15/2017 Filemon Colin PTA GP 3    26136096136 HC PT THER SUPP EA 15 MIN 6/15/2017 Filemon Colin PTA GP 1                    Filemon Colin PTA  6/15/2017

## 2017-06-19 ENCOUNTER — HOSPITAL ENCOUNTER (OUTPATIENT)
Dept: PHYSICAL THERAPY | Facility: HOSPITAL | Age: 67
Setting detail: THERAPIES SERIES
Discharge: HOME | End: 2017-06-19

## 2017-06-19 DIAGNOSIS — M19.011 PRIMARY OSTEOARTHRITIS, RIGHT SHOULDER: ICD-10-CM

## 2017-06-19 DIAGNOSIS — M75.21 BICEPS TENDINITIS OF RIGHT SHOULDER: Primary | ICD-10-CM

## 2017-06-19 PROCEDURE — 97110 THERAPEUTIC EXERCISES: CPT

## 2017-06-19 NOTE — THERAPY TREATMENT NOTE
Outpatient Physical Therapy Ortho Treatment Note  [facility]     Patient Name: Emerson Sofia  : 1950  MRN: [id number]  Today's Date: 2017      Visit Date: 2017     Subjective Improvement: 60%       Attendance:   Approved: Medicare Cap           MD follow up:  or            RC date: 7/3/17          Visit Dx:    ICD-10-CM ICD-9-CM   1. Biceps tendinitis of right shoulder M75.21 726.12   2. Primary osteoarthritis, right shoulder M19.011 715.11       There is no problem list on file for this patient.       Past Medical History:   Diagnosis Date   • High cholesterol         Past Surgical History:   Procedure Laterality Date   • PACEMAKER IMPLANTATION               PT Ortho       17 0800    Subjective Comments    Subjective Comments Pt states that he is sore today.   -MB    Precautions and Contraindications    Contraindications Pacemaker: No IFC  -MB    Subjective Pain    Able to rate subjective pain? yes  -MB    Pre-Treatment Pain Level 2  -MB    Post-Treatment Pain Level 0  -MB    Right Shoulder    Flexion AROM Deficit 165°  -MB    ABduction AROM Deficit 155°  -MB      User Key  (r) = Recorded By, (t) = Taken By, (c) = Cosigned By    Initials Name Provider Type    JULIANNA Colin PTA Physical Therapy Assistant                            PT Assessment/Plan       17 0900       PT Assessment    Functional Limitations Limitation in home management;Performance in self-care ADL;Performance in leisure activities  -MB     Impairments Range of motion;Muscle strength;Pain;Posture  -MB     Assessment Comments ROM same as previous. Pt reports increase in overall subjective improvement today. Improved tolerance to SL ER vs tband ER from previous visit.    -MB     Rehab Potential Excellent  -MB     Patient/caregiver participated in establishment of treatment plan and goals Yes  -MB     Patient would benefit from skilled therapy intervention Yes  -MB     PT Plan    PT  Frequency 2x/week  -MB     Predicted Duration of Therapy Intervention (days/wks) 2-3 more weeks  -MB     PT Plan Comments CC lat pulldown next.   -MB       User Key  (r) = Recorded By, (t) = Taken By, (c) = Cosigned By    Initials Name Provider Type    JULIANNA Colbert MARCELLA ANALIA Colin Physical Therapy Assistant                Modalities       06/19/17 0800          Ice    Ice Applied Yes  -MB      Location R Shoulder  -MB      Rx Minutes 15 mins  -MB      Ice S/P Rx Yes  -MB        User Key  (r) = Recorded By, (t) = Taken By, (c) = Cosigned By    Initials Name Provider Type    JULIANNA HuntereANALIA Physical Therapy Assistant                Exercises       06/19/17 0800          Subjective Comments    Subjective Comments Pt states that he is sore today.   -MB      Subjective Pain    Able to rate subjective pain? yes  -MB      Pre-Treatment Pain Level 2  -MB      Post-Treatment Pain Level 0  -MB      Aquatics    Aquatics performed? No  -MB      Exercise 1    Exercise Name 1 PRO II for ROM/Endurance  -MB      Resistance 1 --   3.5  -MB      Time (Minutes) 1 10 min  -MB      Exercise 2    Exercise Name 2 Pulley's FF/ABD  -MB      Sets 2 1  -MB      Reps 2 20  -MB      Exercise 3    Exercise Name 3 Tband Rows/Ext  -MB      Equipment 3 Theraband  -MB      Resistance 3 Green  -MB      Sets 3 2  -MB      Reps 3 15  -MB      Exercise 4    Exercise Name 4 Tband IR  -MB      Equipment 4 Theraband  -MB      Resistance 4 Red  -MB      Sets 4 2  -MB      Reps 4 15  -MB      Exercise 5    Exercise Name 5 Wall Push Ups  -MB      Sets 5 2  -MB      Reps 5 10  -MB      Exercise 6    Exercise Name 6 No Moneys  -MB      Equipment 6 Theraband  -MB      Resistance 6 Red  -MB      Sets 6 2  -MB      Reps 6 10  -MB      Exercise 7    Exercise Name 7 AROM Shoulder FF/scap/Abd to 90 deg  -MB      Equipment 7 Dumbell  -MB      Weights/Plates 7 1  -MB      Sets 7 2  -MB      Reps 7 10  -MB      Exercise 8    Exercise Name 8 Supine Serratus Punches  with Tbar  -MB      Weights/Plates 8 3  -MB      Sets 8 2  -MB      Reps 8 10  -MB      Exercise 9    Exercise Name 9 Sidelying ER  -MB      Equipment 9 Dumbell  -MB      Weights/Plates 9 1  -MB      Sets 9 2  -MB      Reps 9 10  -MB      Exercise 10    Exercise Name 10 CC: biceps/triceps  -MB      Weights/Plates 10 3 Plates  -MB      Sets 10 2  -MB      Reps 10 10  -MB        User Key  (r) = Recorded By, (t) = Taken By, (c) = Cosigned By    Initials Name Provider Type    JULIANNA Colin, PTA Physical Therapy Assistant                               PT OP Goals       06/19/17 0900       PT Short Term Goals    STG Date to Achieve 07/03/17  -MB     STG 1 Indep in HEP  -MB     STG 1 Progress Ongoing  -MB     STG 2 Tolerate 45 min treatment session without increased pain  -MB     STG 2 Progress Met  -MB     STG 3 Demonstrate R shoulder flex AROM to 165 deg or greater  -MB     STG 3 Progress Met  -MB     STG 4 Demonstrate R should abd AROM to 160 deg or greater  -MB     STG 4 Progress Ongoing;Progressing  -MB     Long Term Goals    LTG Date to Achieve 07/03/17  -MB     LTG 1 Subjectively report 65% improvement or greater  -MB     LTG 1 Progress Ongoing;Progressing  -MB     LTG 2 QuickDASH score to 25% or less  -MB     LTG 2 Progress Ongoing;Progressing  -MB     LTG 3 Demonstrate R shoulder flex/abd MMT to 5/5  -MB     LTG 3 Progress Ongoing;Progressing  -MB     LTG 4 Demonstrate R shoulder ER MMT to 4+/5 or greater  -MB     LTG 4 Progress Ongoing;Progressing  -MB     LTG 5 Demonstrate R  strength 95# or greater  -MB     LTG 5 Progress Ongoing;Progressing  -MB     LTG 6 No pain with full R shoulder flex/abd AROM  -MB     LTG 6 Progress Ongoing;Progressing  -MB     LTG 7 Be able to lift a gallon of milk/8# without increased pain in shoulder  -MB     LTG 7 Progress New  -MB     Time Calculation    PT Goal Re-Cert Due Date 07/03/17   MD Camryn 6/26 or 6/28, Improvement 60%  -MB       User Key  (r) = Recorded By, (t) =  Taken By, (c) = Cosigned By    Initials Name Provider Type    JULIANNA Colin PTA Physical Therapy Assistant                Therapy Education       06/19/17 0900          Therapy Education    Given HEP;Symptoms/condition management  -MB      Program Reinforced  -MB      How Provided Verbal  -MB      Provided to Patient  -MB      Level of Understanding Verbalized;Demonstrated  -MB        User Key  (r) = Recorded By, (t) = Taken By, (c) = Cosigned By    Initials Name Provider Type    JULIANNA Colin PTA Physical Therapy Assistant                Time Calculation:   Start Time: 0800  Stop Time: 0902  Time Calculation (min): 62 min  Total Timed Code Minutes- PT: 47 minute(s)    Therapy Charges for Today     Code Description Service Date Service Provider Modifiers Qty    64869579912 HC PT THER PROC EA 15 MIN 6/19/2017 Filemon Colin PTA GP 3    14496104094 HC PT THER SUPP EA 15 MIN 6/19/2017 Filemon Colin PTA GP 1                    Filemon Colin PTA  6/19/2017

## 2017-06-21 ENCOUNTER — HOSPITAL ENCOUNTER (OUTPATIENT)
Dept: PHYSICAL THERAPY | Facility: HOSPITAL | Age: 67
Setting detail: THERAPIES SERIES
Discharge: HOME | End: 2017-06-21

## 2017-06-21 DIAGNOSIS — M19.011 PRIMARY OSTEOARTHRITIS, RIGHT SHOULDER: ICD-10-CM

## 2017-06-21 DIAGNOSIS — M75.21 BICEPS TENDINITIS OF RIGHT SHOULDER: Primary | ICD-10-CM

## 2017-06-21 PROCEDURE — 97110 THERAPEUTIC EXERCISES: CPT

## 2017-06-21 NOTE — THERAPY TREATMENT NOTE
Outpatient Physical Therapy Ortho Treatment Note  [facility]     Patient Name: Emerson Sofia  : 1950  MRN: [id number]  Today's Date: 2017      Visit Date: 2017     Subjective Improvement: 60%       Attendance:    Approved: Medicare Cap          MD follow up: 17           RC date:  7/3/17         Visit Dx:    ICD-10-CM ICD-9-CM   1. Biceps tendinitis of right shoulder M75.21 726.12   2. Primary osteoarthritis, right shoulder M19.011 715.11       There is no problem list on file for this patient.       Past Medical History:   Diagnosis Date   • High cholesterol         Past Surgical History:   Procedure Laterality Date   • PACEMAKER IMPLANTATION               PT Ortho       17 08    Precautions and Contraindications    Contraindications Pacemaker: No IFC  -MB    Subjective Pain    Able to rate subjective pain? yes  -MB    Pre-Treatment Pain Level 0  -MB    Post-Treatment Pain Level 0  -MB    Right Shoulder    Flexion AROM Deficit 165°  -MB    ABduction AROM Deficit 159°  -MB    External Rotation AROM Deficit 55°  -MB    Internal Rotation AROM Deficit T9  -MB      17 0800    Subjective Comments    Subjective Comments Pt states that he is sore today.   -MB    Precautions and Contraindications    Contraindications Pacemaker: No IFC  -MB    Subjective Pain    Able to rate subjective pain? yes  -MB    Pre-Treatment Pain Level 2  -MB    Post-Treatment Pain Level 0  -MB    Right Shoulder    Flexion AROM Deficit 165°  -MB    ABduction AROM Deficit 155°  -MB      User Key  (r) = Recorded By, (t) = Taken By, (c) = Cosigned By    Initials Name Provider Type    JULIANNA Colin, PTA Physical Therapy Assistant                            PT Assessment/Plan       17 0800       PT Assessment    Functional Limitations Limitation in home management;Performance in self-care ADL;Performance in leisure activities  -MB     Impairments Range of motion;Muscle  "strength;Pain;Posture  -MB     Assessment Comments Much better tolerance to treatment today. Pt progressing well with strengthening exercises at this time.   -MB     Rehab Potential Excellent  -MB     Patient/caregiver participated in establishment of treatment plan and goals Yes  -MB     Patient would benefit from skilled therapy intervention Yes  -MB     PT Plan    PT Frequency 2x/week  -MB     Predicted Duration of Therapy Intervention (days/wks) 2-3 more weeks  -MB     PT Plan Comments Possible Cybex next week. MD note Monday.  -MB       User Key  (r) = Recorded By, (t) = Taken By, (c) = Cosigned By    Initials Name Provider Type    JULIANNA Colin PTA Physical Therapy Assistant                Modalities       06/21/17 0800          Ice    Ice Applied Yes  -MB      Location R Shoulder  -MB      Rx Minutes 15 mins  -MB      Ice S/P Rx Yes  -MB        User Key  (r) = Recorded By, (t) = Taken By, (c) = Cosigned By    Initials Name Provider Type    JULIANNA Colin PTA Physical Therapy Assistant                Exercises       06/21/17 0800          Subjective Comments    Subjective Comments Pt states that his shoulder is sore this morning. \"I can tell it is getting better.\"   -MB      Subjective Pain    Able to rate subjective pain? yes  -MB      Pre-Treatment Pain Level 0  -MB      Post-Treatment Pain Level 0  -MB      Aquatics    Aquatics performed? No  -MB      Exercise 1    Exercise Name 1 PRO II for ROM/Endurance  -MB      Resistance 1 --   4.0  -MB      Time (Minutes) 1 10 min  -MB      Exercise 2    Exercise Name 2 Pulley's FF/ABD  -MB      Sets 2 1  -MB      Reps 2 20  -MB      Exercise 3    Exercise Name 3 Tband Rows/Ext  -MB      Equipment 3 Theraband  -MB      Resistance 3 Green  -MB      Sets 3 2  -MB      Reps 3 15  -MB      Exercise 4    Exercise Name 4 Tband IR  -MB      Equipment 4 Theraband  -MB      Resistance 4 Red  -MB      Sets 4 2  -MB      Reps 4 15  -MB      Exercise 5    Exercise " Name 5 Wall Push Ups  -MB      Sets 5 2  -MB      Reps 5 15  -MB      Exercise 6    Exercise Name 6 CC: Biceps/Triceps  -MB      Weights/Plates 6 3 Plates  -MB      Sets 6 2  -MB      Reps 6 10  -MB      Exercise 7    Exercise Name 7 AROM Shoulder FF/scap/Abd to 90 deg  -MB      Equipment 7 Dumbell  -MB      Weights/Plates 7 1  -MB      Sets 7 2  -MB      Reps 7 10  -MB      Exercise 8    Exercise Name 8 Supine Clocks  -MB      Equipment 8 Theraband  -MB      Resistance 8 Red  -MB      Sets 8 2  -MB      Reps 8 10  -MB      Exercise 9    Exercise Name 9 Sidelying ER  -MB      Equipment 9 Dumbell  -MB      Weights/Plates 9 1  -MB      Sets 9 2  -MB      Reps 9 10  -MB      Exercise 10    Exercise Name 10 CC: Lat pulldown  -MB      Weights/Plates 10 3 Plates  -MB      Sets 10 2  -MB      Reps 10 10  -MB        User Key  (r) = Recorded By, (t) = Taken By, (c) = Cosigned By    Initials Name Provider Type    JULIANNA Colin PTA Physical Therapy Assistant                        Manual Rx (last 36 hours)      Manual Treatments       06/21/17 0700          Manual Rx 1    Manual Rx 1 Location R shoulder  -MB      Manual Rx 1 Type PROM/ GH jt mobes  -MB      Manual Rx 1 Duration 5 min  -MB        User Key  (r) = Recorded By, (t) = Taken By, (c) = Cosigned By    Initials Name Provider Type    JULIANNA Colin PTA Physical Therapy Assistant                PT OP Goals       06/21/17 0800       PT Short Term Goals    STG Date to Achieve 07/03/17  -MB     STG 1 Indep in HEP  -MB     STG 1 Progress Ongoing  -MB     STG 2 Tolerate 45 min treatment session without increased pain  -MB     STG 2 Progress Met  -MB     STG 3 Demonstrate R shoulder flex AROM to 165 deg or greater  -MB     STG 3 Progress Met  -MB     STG 4 Demonstrate R should abd AROM to 160 deg or greater  -MB     STG 4 Progress Ongoing;Progressing  -MB     Long Term Goals    LTG Date to Achieve 07/03/17  -MB     LTG 1 Subjectively report 65% improvement or  greater  -MB     LTG 1 Progress Ongoing;Progressing  -MB     LTG 2 QuickDASH score to 25% or less  -MB     LTG 2 Progress Ongoing;Progressing  -MB     LTG 3 Demonstrate R shoulder flex/abd MMT to 5/5  -MB     LTG 3 Progress Ongoing;Progressing  -MB     LTG 4 Demonstrate R shoulder ER MMT to 4+/5 or greater  -MB     LTG 4 Progress Ongoing;Progressing  -MB     LTG 5 Demonstrate R  strength 95# or greater  -MB     LTG 5 Progress Ongoing;Progressing  -MB     LTG 6 No pain with full R shoulder flex/abd AROM  -MB     LTG 6 Progress Ongoing;Progressing  -MB     LTG 7 Be able to lift a gallon of milk/8# without increased pain in shoulder  -MB     LTG 7 Progress New  -MB     Time Calculation    PT Goal Re-Cert Due Date 07/03/17 9/9, MD 6/28, Improvement 60%  -MB       User Key  (r) = Recorded By, (t) = Taken By, (c) = Cosigned By    Initials Name Provider Type    JULIANNA Colin PTA Physical Therapy Assistant                Therapy Education       06/21/17 0800          Therapy Education    Given HEP;Symptoms/condition management  -MB      Program Reinforced  -MB      How Provided Verbal  -MB      Provided to Patient  -MB      Level of Understanding Verbalized;Demonstrated  -MB        User Key  (r) = Recorded By, (t) = Taken By, (c) = Cosigned By    Initials Name Provider Type    JULIANNA Colin PTA Physical Therapy Assistant                Time Calculation:   Start Time: 0758  Stop Time: 0901  Time Calculation (min): 63 min  Total Timed Code Minutes- PT: 48 minute(s)    Therapy Charges for Today     Code Description Service Date Service Provider Modifiers Qty    38310692283 HC PT THER PROC EA 15 MIN 6/21/2017 Filemon Colin PTA GP 3    00125759776 HC PT THER SUPP EA 15 MIN 6/21/2017 Filemon Colin PTA GP 1                    Filemon Colin PTA  6/21/2017

## 2017-06-26 ENCOUNTER — HOSPITAL ENCOUNTER (OUTPATIENT)
Dept: PHYSICAL THERAPY | Facility: HOSPITAL | Age: 67
Setting detail: THERAPIES SERIES
Discharge: HOME | End: 2017-06-26

## 2017-06-26 DIAGNOSIS — M19.011 PRIMARY OSTEOARTHRITIS, RIGHT SHOULDER: ICD-10-CM

## 2017-06-26 DIAGNOSIS — M75.21 BICEPS TENDINITIS OF RIGHT SHOULDER: Primary | ICD-10-CM

## 2017-06-26 PROCEDURE — 97110 THERAPEUTIC EXERCISES: CPT

## 2017-06-26 NOTE — THERAPY TREATMENT NOTE
Outpatient Physical Therapy Ortho Treatment Note  [facility]     Patient Name: Emerson Sofia  : 1950  MRN: [id number]  Today's Date: 2017      Visit Date: 2017     Subjective Improvement: 70%      Attendance: 10/10   Approved: Medicare Cap           MD follow up: 17           RC date: 7/3/17           Visit Dx:    ICD-10-CM ICD-9-CM   1. Biceps tendinitis of right shoulder M75.21 726.12   2. Primary osteoarthritis, right shoulder M19.011 715.11       There is no problem list on file for this patient.       Past Medical History:   Diagnosis Date   • High cholesterol         Past Surgical History:   Procedure Laterality Date   • PACEMAKER IMPLANTATION               PT Ortho       17 1300    Subjective Comments    Subjective Comments Pt states that he had a good weekend. No new complaints.   -MB    Precautions and Contraindications    Contraindications Pacemaker: No IFC  -MB    Subjective Pain    Able to rate subjective pain? yes  -MB    Pre-Treatment Pain Level 0  -MB    Post-Treatment Pain Level 0  -MB    Right Shoulder    Flexion AROM Deficit 165°  -MB    ABduction AROM Deficit 160°  -MB    External Rotation AROM Deficit 64°  -MB    Internal Rotation AROM Deficit T9  -MB      User Key  (r) = Recorded By, (t) = Taken By, (c) = Cosigned By    Initials Name Provider Type    JULIANNA Colin PTA Physical Therapy Assistant                            PT Assessment/Plan       17 1300       PT Assessment    Functional Limitations Limitation in home management;Performance in self-care ADL;Performance in leisure activities  -MB     Impairments Range of motion;Muscle strength;Pain;Posture  -MB     Assessment Comments Pt still lacking functional strength is ER at R shoulder. ROM progressing very nicely overall. Increase in overall subjective improvement reported.   -MB     Rehab Potential Excellent  -MB     Patient/caregiver participated in establishment of treatment  plan and goals Yes  -MB     Patient would benefit from skilled therapy intervention Yes  -MB     PT Plan    PT Frequency 2x/week  -MB     Predicted Duration of Therapy Intervention (days/wks) 2-3 more weeks  -MB     PT Plan Comments MD note sent with pt today, will continue as advisied.   -MB       User Key  (r) = Recorded By, (t) = Taken By, (c) = Cosigned By    Initials Name Provider Type    JULIANNA Colin PTA Physical Therapy Assistant                Modalities       06/26/17 1300          Ice    Ice Applied Yes  -MB      Location R Shoulder  -MB      Rx Minutes 15 mins  -MB      Ice S/P Rx Yes  -MB        User Key  (r) = Recorded By, (t) = Taken By, (c) = Cosigned By    Initials Name Provider Type    JULIANNA Colin PTA Physical Therapy Assistant                Exercises       06/26/17 1300          Subjective Comments    Subjective Comments Pt states that he had a good weekend. No new complaints.   -MB      Subjective Pain    Able to rate subjective pain? yes  -MB      Pre-Treatment Pain Level 0  -MB      Post-Treatment Pain Level 0  -MB      Aquatics    Aquatics performed? No  -MB      Exercise 1    Exercise Name 1 PRO II for ROM/Endurance  -MB      Resistance 1 --   4.5  -MB      Time (Minutes) 1 10 min  -MB      Exercise 2    Exercise Name 2 Pulley's FF/ABD  -MB      Sets 2 1  -MB      Reps 2 20  -MB      Exercise 3    Exercise Name 3 Cybex Rows  -MB      Weights/Plates 3 --   45#  -MB      Sets 3 2  -MB      Reps 3 10  -MB      Exercise 4    Exercise Name 4 Cybex Incline Rows  -MB      Weights/Plates 4 --   50#  -MB      Sets 4 2  -MB      Reps 4 10  -MB      Exercise 5    Exercise Name 5 Cybex Rear Delt  -MB      Weights/Plates 5 --   30#  -MB      Sets 5 2  -MB      Reps 5 10  -MB      Exercise 6    Exercise Name 6 CC: Biceps/Triceps  -MB      Weights/Plates 6 3 Plates  -MB      Sets 6 2  -MB      Reps 6 10  -MB      Exercise 7    Exercise Name 7 Tband IR  -MB      Resistance 7 Green  -MB       Sets 7 2  -MB      Reps 7 15  -MB      Exercise 8    Exercise Name 8 Wall pushups  -MB      Sets 8 2  -MB      Reps 8 15  -MB      Exercise 9    Exercise Name 9 Sidelying ER  -MB      Equipment 9 Dumbell  -MB      Weights/Plates 9 1  -MB      Sets 9 3  -MB      Reps 9 10  -MB        User Key  (r) = Recorded By, (t) = Taken By, (c) = Cosigned By    Initials Name Provider Type    JULIANNA Colin PTA Physical Therapy Assistant                               PT OP Goals       06/26/17 1300       PT Short Term Goals    STG Date to Achieve 07/03/17  -MB     STG 1 Indep in HEP  -MB     STG 1 Progress Ongoing  -MB     STG 2 Tolerate 45 min treatment session without increased pain  -MB     STG 2 Progress Met  -MB     STG 3 Demonstrate R shoulder flex AROM to 165 deg or greater  -MB     STG 3 Progress Met  -MB     STG 4 Demonstrate R should abd AROM to 160 deg or greater  -MB     STG 4 Progress Met  -MB     Long Term Goals    LTG Date to Achieve 07/03/17  -MB     LTG 1 Subjectively report 65% improvement or greater  -MB     LTG 1 Progress Met  -MB     LTG 2 QuickDASH score to 25% or less  -MB     LTG 2 Progress Ongoing;Progressing  -MB     LTG 3 Demonstrate R shoulder flex/abd MMT to 5/5  -MB     LTG 3 Progress Ongoing;Progressing  -MB     LTG 4 Demonstrate R shoulder ER MMT to 4+/5 or greater  -MB     LTG 4 Progress Ongoing;Progressing  -MB     LTG 5 Demonstrate R  strength 95# or greater  -MB     LTG 5 Progress Ongoing;Progressing  -MB     LTG 6 No pain with full R shoulder flex/abd AROM  -MB     LTG 6 Progress Met  -MB     LTG 7 Be able to lift a gallon of milk/8# without increased pain in shoulder  -MB     LTG 7 Progress New  -MB     Time Calculation    PT Goal Re-Cert Due Date 07/03/17   10/10, MD 6/28/17, Improvement 70%  -MB       User Key  (r) = Recorded By, (t) = Taken By, (c) = Cosigned By    Initials Name Provider Type    JULIANNA Colin PTA Physical Therapy Assistant                Therapy Education        06/26/17 1300          Therapy Education    Given HEP;Symptoms/condition management  -MB      Program Reinforced  -MB      How Provided Verbal  -MB      Provided to Patient  -MB      Level of Understanding Verbalized;Demonstrated  -MB        User Key  (r) = Recorded By, (t) = Taken By, (c) = Cosigned By    Initials Name Provider Type    JULIANNA Colin PTA Physical Therapy Assistant                Time Calculation:   Start Time: 1258  Stop Time: 1400  Time Calculation (min): 62 min  Total Timed Code Minutes- PT: 47 minute(s)    Therapy Charges for Today     Code Description Service Date Service Provider Modifiers Qty    13399914926 HC PT THER PROC EA 15 MIN 6/26/2017 Filemon Colin PTA GP 3    [id number] HC PT THER SUPP EA 15 MIN 6/26/2017 Filemon Colin PTA GP 1                    Filemon Colin PTA  6/26/2017

## 2017-06-28 ENCOUNTER — APPOINTMENT (OUTPATIENT)
Dept: PHYSICAL THERAPY | Facility: HOSPITAL | Age: 67
End: 2017-06-28

## 2017-06-29 ENCOUNTER — HOSPITAL ENCOUNTER (OUTPATIENT)
Dept: PHYSICAL THERAPY | Facility: HOSPITAL | Age: 67
Setting detail: THERAPIES SERIES
Discharge: HOME | End: 2017-06-29

## 2017-06-29 DIAGNOSIS — M75.21 BICEPS TENDINITIS OF RIGHT SHOULDER: Primary | ICD-10-CM

## 2017-06-29 DIAGNOSIS — M19.011 PRIMARY OSTEOARTHRITIS, RIGHT SHOULDER: ICD-10-CM

## 2017-06-29 PROCEDURE — 97110 THERAPEUTIC EXERCISES: CPT

## 2017-06-29 NOTE — THERAPY DISCHARGE NOTE
Outpatient Physical Therapy Ortho Treatment Note/Discharge Summary  [facility], [place]     Patient Name: Emerson Sofia  : 1950  MRN: [id number]  Today's Date: 2017      Visit Date: 2017     Subjective Improvement:  75%     Attendance:    Approved:  Medicare Cap         MD follow up: 17          RC date: 7/3/17           Visit Dx:    ICD-10-CM ICD-9-CM   1. Biceps tendinitis of right shoulder M75.21 726.12   2. Primary osteoarthritis, right shoulder M19.011 715.11       There is no problem list on file for this patient.       Past Medical History:   Diagnosis Date   • High cholesterol         Past Surgical History:   Procedure Laterality Date   • PACEMAKER IMPLANTATION               PT Ortho       17 1300    Subjective Comments    Subjective Comments Pt states that his doctor wants this treatment to be his last. MD states that pt will have to have surgury down the road and wants to save visits until then.   -MB    Precautions and Contraindications    Contraindications Pacemaker: No IFC  -MB    Subjective Pain    Able to rate subjective pain? yes  -MB    Pre-Treatment Pain Level 0  -MB    Post-Treatment Pain Level 0  -MB      User Key  (r) = Recorded By, (t) = Taken By, (c) = Cosigned By    Initials Name Provider Type    JULIANNA Colin, PTA Physical Therapy Assistant                            PT Assessment/Plan       17 1300       PT Assessment    Functional Limitations Limitation in home management;Performance in self-care ADL;Performance in leisure activities  -MB     Impairments Range of motion;Muscle strength;Pain;Posture  -MB     Assessment Comments AROM in R shoulder WFL at this time. Pt verbalized importance to continue HEP on his own. I with HEP at this time.   -MB     Rehab Potential Excellent  -MB     Patient/caregiver participated in establishment of treatment plan and goals Yes  -MB     Patient would benefit from skilled therapy intervention Yes   -MB     PT Plan    PT Frequency 2x/week  -MB     Predicted Duration of Therapy Intervention (days/wks) 2-3 more weeks  -MB     PT Plan Comments D/C to I HEP at this time.   -MB       User Key  (r) = Recorded By, (t) = Taken By, (c) = Cosigned By    Initials Name Provider Type    JULIANNA Colin, PTA Physical Therapy Assistant                    Exercises       06/29/17 1300          Subjective Comments    Subjective Comments Pt states that his doctor wants this treatment to be his last. MD states that pt will have to have surgury down the road and wants to save visits until then.   -MB      Subjective Pain    Able to rate subjective pain? yes  -MB      Pre-Treatment Pain Level 0  -MB      Post-Treatment Pain Level 0  -MB      Aquatics    Aquatics performed? No  -MB      Exercise 1    Exercise Name 1 PRO II for ROM/Endurance  -MB      Resistance 1 --   4.5  -MB      Time (Minutes) 1 10 min  -MB      Exercise 2    Exercise Name 2 Pulley's FF/ABD  -MB      Sets 2 1  -MB      Reps 2 20  -MB      Exercise 3    Exercise Name 3 Cybex Rows  -MB      Weights/Plates 3 --   45#  -MB      Sets 3 2  -MB      Reps 3 10  -MB      Exercise 4    Exercise Name 4 Cybex Incline Rows  -MB      Weights/Plates 4 --   50#  -MB      Sets 4 2  -MB      Reps 4 10  -MB      Exercise 5    Exercise Name 5 Cybex Rear Delt  -MB      Weights/Plates 5 --   30#  -MB      Sets 5 2  -MB      Reps 5 10  -MB      Exercise 6    Exercise Name 6 CC: Biceps/Triceps  -MB      Weights/Plates 6 3 Plates  -MB      Sets 6 2  -MB      Reps 6 10  -MB      Exercise 7    Exercise Name 7 Tband IR  -MB      Resistance 7 Green  -MB      Sets 7 2  -MB      Reps 7 15  -MB      Exercise 8    Exercise Name 8 Wall pushups  -MB      Sets 8 2  -MB      Reps 8 15  -MB      Exercise 9    Exercise Name 9 Sidelying ER  -MB      Equipment 9 Dumbell  -MB      Weights/Plates 9 1  -MB      Sets 9 3  -MB      Reps 9 10  -MB        User Key  (r) = Recorded By, (t) = Taken By, (c) =  Cosigned By    Initials Name Provider Type    JULIANNA Colin PTA Physical Therapy Assistant                               PT OP Goals       06/29/17 1300       PT Short Term Goals    STG Date to Achieve 07/03/17  -MB     STG 1 Indep in HEP  -MB     STG 1 Progress Ongoing  -MB     STG 2 Tolerate 45 min treatment session without increased pain  -MB     STG 2 Progress Met  -MB     STG 3 Demonstrate R shoulder flex AROM to 165 deg or greater  -MB     STG 3 Progress Met  -MB     STG 4 Demonstrate R should abd AROM to 160 deg or greater  -MB     STG 4 Progress Met  -MB     Long Term Goals    LTG Date to Achieve 07/03/17  -MB     LTG 1 Subjectively report 65% improvement or greater  -MB     LTG 1 Progress Met  -MB     LTG 2 QuickDASH score to 25% or less  -MB     LTG 2 Progress Ongoing;Progressing  -MB     LTG 3 Demonstrate R shoulder flex/abd MMT to 5/5  -MB     LTG 3 Progress Ongoing;Progressing  -MB     LTG 4 Demonstrate R shoulder ER MMT to 4+/5 or greater  -MB     LTG 4 Progress Ongoing;Progressing  -MB     LTG 5 Demonstrate R  strength 95# or greater  -MB     LTG 5 Progress Ongoing;Progressing  -MB     LTG 6 No pain with full R shoulder flex/abd AROM  -MB     LTG 6 Progress Met  -MB     LTG 7 Be able to lift a gallon of milk/8# without increased pain in shoulder  -MB     LTG 7 Progress New  -MB     Time Calculation    PT Goal Re-Cert Due Date 07/03/17 11/11, MD PRN, Improvement 75%  -MB       User Key  (r) = Recorded By, (t) = Taken By, (c) = Cosigned By    Initials Name Provider Type    JULIANNA Colin PTA Physical Therapy Assistant                Therapy Education       06/29/17 1300          Therapy Education    Given HEP;Symptoms/condition management  -MB      Program Reinforced  -MB      How Provided Verbal  -MB      Provided to Patient  -MB      Level of Understanding Verbalized;Demonstrated  -MB        User Key  (r) = Recorded By, (t) = Taken By, (c) = Cosigned By    Initials Name Provider  Type    JULIANNA Colin PTA Physical Therapy Assistant                Time Calculation:   Start Time: 1257  Stop Time: 1400  Time Calculation (min): 63 min  Total Timed Code Minutes- PT: 48 minute(s)    Therapy Charges for Today     Code Description Service Date Service Provider Modifiers Qty    51814037640 HC PT THER PROC EA 15 MIN 6/29/2017 Filemon Colin PTA GP 3    79153256412 HC PT THER SUPP EA 15 MIN 6/29/2017 Filemon Colin PTA GP 1                OP PT Discharge Summary  Date of Discharge: 06/29/17  Reason for Discharge: Maximum functional potential achieved  Outcomes Achieved: Able to achieve all goals within established timeline  Discharge Instructions: D/C to I HEP at this time per MD orders. Pt has progressed very well with therapy to this point.      Filemon Colin PTA  6/29/2017

## 2017-10-13 ENCOUNTER — TRANSCRIBE ORDERS (OUTPATIENT)
Dept: PHYSICAL THERAPY | Facility: HOSPITAL | Age: 67
End: 2017-10-13

## 2017-10-13 DIAGNOSIS — S46.011A TRAUMATIC COMPLETE TEAR OF RIGHT ROTATOR CUFF, INITIAL ENCOUNTER: Primary | ICD-10-CM

## 2017-10-18 ENCOUNTER — APPOINTMENT (OUTPATIENT)
Dept: PHYSICAL THERAPY | Facility: HOSPITAL | Age: 67
End: 2017-10-18

## 2017-10-19 ENCOUNTER — HOSPITAL ENCOUNTER (OUTPATIENT)
Dept: PHYSICAL THERAPY | Facility: HOSPITAL | Age: 67
Setting detail: THERAPIES SERIES
Discharge: HOME | End: 2017-10-19

## 2017-10-19 DIAGNOSIS — S46.012A TRAUMATIC COMPLETE TEAR OF LEFT ROTATOR CUFF, INITIAL ENCOUNTER: Primary | ICD-10-CM

## 2017-10-19 DIAGNOSIS — Z98.890 S/P ROTATOR CUFF REPAIR: ICD-10-CM

## 2017-10-19 PROCEDURE — G8984 CARRY CURRENT STATUS: HCPCS

## 2017-10-19 PROCEDURE — 97110 THERAPEUTIC EXERCISES: CPT

## 2017-10-19 PROCEDURE — G8985 CARRY GOAL STATUS: HCPCS

## 2017-10-19 PROCEDURE — 97140 MANUAL THERAPY 1/> REGIONS: CPT

## 2017-10-19 PROCEDURE — 97161 PT EVAL LOW COMPLEX 20 MIN: CPT

## 2017-10-19 NOTE — THERAPY EVALUATION
Outpatient Physical Therapy Ortho Initial Evaluation  [facility]     Patient Name: Emerson Sofia  : 1950  MRN: [id number]  Today's Date: 10/19/2017      Subjective Improvement:  Attendance:   Approved: Medicare guidelines  MD Follow up: beginning of November   Date: 17    Visit Date: 10/19/2017    There is no problem list on file for this patient.       Past Medical History:   Diagnosis Date   • High cholesterol         Past Surgical History:   Procedure Laterality Date   • PACEMAKER IMPLANTATION       Allergies   Allergen Reactions   • Penicillins      Medications:  Plavix  Aspirin  Allopurinol  Propranolol  Multivitamin  Omega 3    Visit Dx:     ICD-10-CM ICD-9-CM   1. Traumatic complete tear of left rotator cuff, initial encounter S46.012A 840.4   2. S/P rotator cuff repair Z98.890 V45.89             Patient History       10/19/17 1400          History    Chief Complaint Joint stiffness;Muscle weakness  -NH      Date Current Problem(s) Began 10/03/17   Sx  -NH      Brief Description of Current Complaint Patient had tried to treat shoulder pain conservatively back in the summer, but the pain continued to get worse. Patient had a RTC repair on 10/3/17 to fix a complete tear. Patient states his MD told him he probably shoulder have fixed it many years ago because of all the damage in the shoulder. Also reports bone spurs being removed. Patient lives at home with wife who has medical conditions that requires him to take care of her.  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 Accidently moving  arm  -NH      What Performance Factors Make the Current Problem(s) BETTER? Ice  -NH        User Key  (r) = Recorded By, (t) = Taken By, (c) = Cosigned By    Initials Name Provider Type    NH Susie Watson, PT Physical Therapist                PT Ortho       10/19/17 1400    Subjective Comments    Subjective Comments Patient reports he doesn't like to take the heavy duty pain meds because it makes him foggy and he can't drive.  -NH    Precautions and Contraindications    Precautions See Protocol  -NH    Contraindications Pacemaker: No IFC  -NH    Posture/Observations    Posture/Observations Comments Patient presents to PT with R arm in sling with adductor pillow. Patient is IND in don/doffing his sling.  -NH    ROM (Range of Motion)    General ROM upper extremity range of motion deficits identified  -NH    Left Shoulder    Flexion AROM Deficit 165  -NH    ABduction AROM Deficit 165  -NH    External Rotation AROM Deficit T4 functional reach  -NH    Internal Rotation AROM Deficit T12 Functional reach  -NH    Right Shoulder    Flexion AROM Deficit Deferred  -NH    Flexion PROM Deficit 95 degrees  -NH    ABduction AROM Deficit Deferred  -NH    ABduction PROM Deficit 60 degrees  -NH    External Rotation AROM Deficit Deferred  -NH    External Rotation PROM Deficit Neutral in 30 degree abd  -NH    Internal Rotation AROM Deficit Deferred  -NH    Internal Rotation PROM Deficit 60 degrees in 30 degrees abd, resting in sling position  -NH    General UE Assessment    ROM shoulder, left: UE ROM deficit;shoulder, right: UE ROM deficit  -NH    MMT (Manual Muscle Testing)    General MMT Assessment Detail Deferred  -NH      User Key  (r) = Recorded By, (t) = Taken By, (c) = Cosigned By    Initials Name Provider Type    NH Susie Watson, PT Physical Therapist                            Therapy Education       10/19/17 1700          Therapy Education    Education Details HEP, icing, pain control with meds  -NH      Given  HEP;Symptoms/condition management;Pain management;Posture/body mechanics  -NH      Program New  -NH      How Provided Verbal;Demonstration;Written  -NH      Provided to Patient  -NH      Level of Understanding Verbalized;Demonstrated  -NH        User Key  (r) = Recorded By, (t) = Taken By, (c) = Cosigned By    Initials Name Provider Type    NH Susie Watson, PT Physical Therapist                PT OP Goals       10/19/17 1400       PT Short Term Goals    STG Date to Achieve 11/21/17  -NH     STG 1 Patient will be IND and compliant with HEP  -NH     STG 2 Patient will have PROM flexion to 160 degrees  -NH     STG 3 Patient will have PROM abduction to 130 degrees  -NH     STG 4 Patient will have PROM ER to 30 degrees  -NH     STG 5 Patient will reduce Quick Dash to 40  -NH     Long Term Goals    LTG Date to Achieve 12/12/17  -NH     LTG 1 Patient will reduce Quick Dash to 20  -NH     LTG 2 Patient will have R shld AROM flexion to 155 degrees  -NH     LTG 3 Patient will have R shld AROM abduction to 150 degrees  -NH     LTG 4 Patient will have active ER to 50 degrees measured at side  -NH     LTG 5 Patient will have IR functional reach to L4/L5  -NH     LTG 6 Patient will be able to tolerate 45 minute treatment session without increased pain on VAS  -NH     Time Calculation    PT Goal Re-Cert Due Date 11/09/17   MD Beginning November  -NH       User Key  (r) = Recorded By, (t) = Taken By, (c) = Cosigned By    Initials Name Provider Type    NH Susie Watson, PT Physical Therapist                PT Assessment/Plan       10/19/17 1700       PT Assessment    Functional Limitations Decreased safety during functional activities;Limitation in home management;Limitations in community activities;Limitations in functional capacity and performance;Performance in leisure activities;Performance in self-care ADL;Performance in work activities  -NH     Impairments Edema;Joint mobility;Muscle strength;Pain;Posture;Range of motion   -NH     Assessment Comments Patient is a 67 year old male presenting to the clinic s/p R RTC repair. 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 Patient tolerated initial evaluation well with slightly increased guarding with PROM that improves with cuing. Patient would benefit from skilled physical therapy to address deficits and progress according to MD protocol.  -NH     Please refer to paper survey for additional self-reported information Yes  -NH     Rehab Potential Good  -NH     Patient/caregiver participated in establishment of treatment plan and goals Yes  -NH     Patient would benefit from skilled therapy intervention Yes  -NH     PT Plan    PT Frequency 2x/week;3x/week  -NH     Predicted Duration of Therapy Intervention (days/wks) 8 weeks  -NH     Planned CPT's? PT EVAL LOW COMPLEXITY: 20131;PT RE-EVAL: 16571;PT THER PROC EA 15 MIN: 32099;PT THER ACT EA 15 MIN: 44479;PT MANUAL THERAPY EA 15 MIN: 02084;PT NEUROMUSC RE-EDUCATION EA 15 MIN: 34348;PT ELECTRICAL STIM UNATTEND: ;PT ULTRASOUND EA 15 MIN: 16785;PT THER SUPP EA 15 MIN  -NH     Physical Therapy Interventions (Optional Details) home exercise program;joint mobilization;manual therapy techniques;modalities;patient/family education;ROM (Range of Motion);stair training;strengthening;stretching  -NH     PT Plan Comments Progress according to post op protocol in chart.  -NH       User Key  (r) = Recorded By, (t) = Taken By, (c) = Cosigned By    Initials Name Provider Type    NH Susie Watson, PT Physical Therapist                Modalities       10/19/17 1400          Ice    Patient reports will apply ice at home to involved area Yes  -NH        User Key  (r) = Recorded By, (t) = Taken By, (c) = Cosigned By    Initials Name Provider Type    NH Susie Watson, PT Physical Therapist              Exercises       10/19/17 1400          Subjective Comments    Subjective Comments Patient reports he doesn't like  to take the heavy duty pain meds because it makes him foggy and he can't drive.  -NH      Aquatics    Aquatics performed? No  -NH      Exercise 1    Exercise Name 1 Pendulums 4 way  -NH      Reps 1 10  -NH      Additional Comments Demo for HEP  -NH      Exercise 2    Exercise Name 2 Elbow AROM 3 way  -NH      Reps 2 10  -NH      Additional Comments Demo for HEP; palm up, thumb up, palm down  -NH      Exercise 3    Exercise Name 3 Scap retraction hands resting on lap  -NH      Reps 3 10  -NH      Additional Comments Demo for HEP  -NH      Exercise 4    Exercise Name 4 Wrist AROM/Gripping  -NH      Cueing 4 Verbal  -NH      Additional Comments HEP Review  -NH        User Key  (r) = Recorded By, (t) = Taken By, (c) = Cosigned By    Initials Name Provider Type    NH Susie Watson, PT Physical Therapist           Manual Rx (last 36 hours)      Manual Treatments       10/19/17 1700          Manual Rx 1    Manual Rx 1 Location PROM L shoulder  -NH      Manual Rx 1 Type STM/Gentle PROM per protocol  -NH      Manual Rx 1 Duration 8'  -NH        User Key  (r) = Recorded By, (t) = Taken By, (c) = Cosigned By    Initials Name Provider Type    NH Susie Watson, PT Physical Therapist                            Outcome Measures       10/19/17 1700          Quick DASH    Open a tight or new jar. 3  -NH      Do heavy household chores (e.g., wash walls, wash floors) 1  -NH      Carry a shopping bag or briefcase 2  -NH      Wash your back 4  -NH      Use a knife to cut food 2  -NH      Recreational activities in which you take some force or impact through your arm, should or hand (e.g. golf, hammering, tennis, etc.) 5  -NH      During the past week, to what extent has your arm, shoulder, or hand problem interfered with your normal social activites with family, friends, neighbors or groups? 5  -NH      During the past week, were you limited in your work or other regular daily activities as a result of your arm, shoulder or hand  problem? 4  -NH      Arm, Shoulder, or hand pain 3  -NH      Tingling (pins and needles) in your arm, shoulder, or hand 2  -NH      During the past week, how much difficulty have you had sleeping because of the pain in your arm, shoulder or hand? 2  -NH      Number of Questions Answered 11  -NH      Quick DASH Score 50  -NH      Functional Assessment    Outcome Measure Options Quick DASH  -NH        User Key  (r) = Recorded By, (t) = Taken By, (c) = Cosigned By    Initials Name Provider Type    NH Susie Watson, PT Physical Therapist            Time Calculation:   Start Time: 1440  Stop Time: 1520  Time Calculation (min): 40 min  Total Timed Code Minutes- PT: 25 minute(s)     Therapy Charges for Today     Code Description Service Date Service Provider Modifiers Qty    45106701299 HC PT CARRY MOV HAND OBJ CURRENT 10/19/2017 Susie Watson, PT GP, CM 1    08955012794 HC PT CARRY MOV HAND OBJ PROJECTED 10/19/2017 Susie Watson, PT GP,  1    21283188679 HC PT EVAL LOW COMPLEXITY 1 10/19/2017 Susie Watson, PT GP 1    24247112834 HC PT THER PROC EA 15 MIN 10/19/2017 Susie Watson, PT GP 1    00288068061 HC PT MANUAL THERAPY EA 15 MIN 10/19/2017 uSsie Watson, PT GP 1          PT G-Codes  PT Professional Judgement Used?: Yes  Outcome Measure Options: Quick DASH  Score: 50  Functional Limitation: Carrying, moving and handling objects  Carrying, Moving and Handling Objects Current Status (): At least 80 percent but less than 100 percent impaired, limited or restricted  Carrying, Moving and Handling Objects Goal Status (): At least 20 percent but less than 40 percent impaired, limited or restricted         Susie Watson, PT  10/19/2017

## 2017-10-25 ENCOUNTER — HOSPITAL ENCOUNTER (OUTPATIENT)
Dept: PHYSICAL THERAPY | Facility: HOSPITAL | Age: 67
Setting detail: THERAPIES SERIES
Discharge: HOME | End: 2017-10-25

## 2017-10-25 DIAGNOSIS — Z98.890 S/P ROTATOR CUFF REPAIR: ICD-10-CM

## 2017-10-25 DIAGNOSIS — S46.012A TRAUMATIC COMPLETE TEAR OF LEFT ROTATOR CUFF, INITIAL ENCOUNTER: Primary | ICD-10-CM

## 2017-10-25 PROCEDURE — 97140 MANUAL THERAPY 1/> REGIONS: CPT

## 2017-10-25 PROCEDURE — 97110 THERAPEUTIC EXERCISES: CPT

## 2017-10-25 NOTE — THERAPY TREATMENT NOTE
Outpatient Physical Therapy Ortho Treatment Note  [facility]  Julia Reyes PTA  10/25/17  3:19 PM       Patient Name: Emerson Sofia  : 1950  MRN: [id number]  Today's Date: 10/25/2017      Visit Date: 10/25/2017    Subjective Improvement: 0%       Attendance: 2/2   Approved: medicare guidelines           MD follow up: 17           RC date: 17        Visit Dx:    ICD-10-CM ICD-9-CM   1. Traumatic complete tear of left rotator cuff, initial encounter S46.012A 840.4   2. S/P rotator cuff repair Z98.890 V45.89       There is no problem list on file for this patient.       Past Medical History:   Diagnosis Date   • High cholesterol         Past Surgical History:   Procedure Laterality Date   • PACEMAKER IMPLANTATION               PT Ortho       10/25/17 1400    Precautions and Contraindications    Precautions See Protocol  -    Contraindications Pacemaker: No IFC  -    Subjective Pain    Post-Treatment Pain Level 0  -    Right Shoulder    Flexion PROM Deficit 148°  -ARA    ABduction PROM Deficit 146°  -      User Key  (r) = Recorded By, (t) = Taken By, (c) = Cosigned By    Initials Name Provider Type    ARA Reyes PTA Physical Therapy Assistant                            PT Assessment/Plan       10/25/17 1500       PT Assessment    Functional Limitations Decreased safety during functional activities;Limitation in home management;Limitations in community activities;Limitations in functional capacity and performance;Performance in leisure activities;Performance in self-care ADL;Performance in work activities  -     Impairments Edema;Joint mobility;Muscle strength;Pain;Posture;Range of motion  -     Assessment Comments pts PROM increased greatly today. pt did have some pain at end ranges but tolerated PROM very well.   -ARA     Rehab Potential Good  -     Patient/caregiver participated in establishment of treatment plan and goals Yes  -     Patient  would benefit from skilled therapy intervention Yes  -ARA     PT Plan    PT Frequency 2x/week;3x/week  -ARA     Predicted Duration of Therapy Intervention (days/wks) 8 weeks  -ARA     PT Plan Comments Cont per protocol  -ARA       User Key  (r) = Recorded By, (t) = Taken By, (c) = Cosigned By    Initials Name Provider Type    ARA Reyes PTA Physical Therapy Assistant                Modalities       10/25/17 1400          Ice    Ice Applied Yes  -ARA      Location R shoulder  -ARA      Rx Minutes 15 mins  -ARA      Ice S/P Rx Yes  -ARA        User Key  (r) = Recorded By, (t) = Taken By, (c) = Cosigned By    Initials Name Provider Type    ARA Reyes PTA Physical Therapy Assistant                Exercises       10/25/17 1400          Subjective Comments    Subjective Comments pt reports that he has taken a pain pill before coming to therapy  -ARA      Subjective Pain    Able to rate subjective pain? yes  -ARA      Pre-Treatment Pain Level 0  -ARA      Post-Treatment Pain Level 0  -ARA      Aquatics    Aquatics performed? No  -ARA      Exercise 1    Exercise Name 1 Pendulums 4 way  -ARA      Time (Seconds) 1 30 sec each  -ARA      Exercise 2    Exercise Name 2 Scap squeezes  -ARA      Sets 2 2  -ARA      Reps 2 10  -ARA      Time (Seconds) 2 5 sec hold  -ARA      Exercise 3    Exercise Name 3 Shoulder shrugs  -ARA      Sets 3 2  -ARA      Reps 3 10  -ARA      Time (Seconds) 3 5 sec hold  -ARA      Exercise 4    Exercise Name 4 AROM: elbow flexion/ext  -ARA      Reps 4 20  -ARA      Exercise 5    Exercise Name 5 AROM: pro/sup  -ARA      Sets 5 2  -ARA      Reps 5 10  -ARA      Exercise 6    Exercise Name 6 Tputty:   -ARA      Time (Minutes) 6 3 min  -ARA      Additional Comments yellow  -ARA        User Key  (r) = Recorded By, (t) = Taken By, (c) = Cosigned By    Initials Name Provider Type    ARA Reyes PTA Physical Therapy Assistant                        Manual Rx (last 36 hours)      Manual Treatments        10/25/17 1500          Manual Rx 1    Manual Rx 1 Location PROM L shoulder  -      Manual Rx 1 Type STM/Gentle PROM per protocol  -      Manual Rx 1 Duration 23 min  -        User Key  (r) = Recorded By, (t) = Taken By, (c) = Cosigned By    Initials Name Provider Type    ARA Reyes PTA Physical Therapy Assistant                PT OP Goals       10/25/17 1500       PT Short Term Goals    STG Date to Achieve 11/21/17  -     STG 1 Patient will be IND and compliant with HEP  -     STG 1 Progress Ongoing  -     STG 2 Patient will have PROM flexion to 160 degrees  -     STG 2 Progress Ongoing  -     STG 3 Patient will have PROM abduction to 130 degrees  -     STG 3 Progress Met  -     STG 4 Patient will have PROM ER to 30 degrees  -     STG 4 Progress Ongoing  -     STG 5 Patient will reduce Quick Dash to 40  -     STG 5 Progress Progressing  -     Long Term Goals    LTG Date to Achieve 12/12/17  -     LTG 1 Patient will reduce Quick Dash to 20  -     LTG 1 Progress Ongoing  -     LTG 2 Patient will have R shld AROM flexion to 155 degrees  -     LTG 2 Progress Ongoing  -     LTG 3 Patient will have R shld AROM abduction to 150 degrees  -     LTG 3 Progress Ongoing  -     LTG 4 Patient will have active ER to 50 degrees measured at side  -     LTG 4 Progress Ongoing  -     LTG 5 Patient will have IR functional reach to L4/L5  -     LTG 5 Progress Ongoing  -     LTG 6 Patient will be able to tolerate 45 minute treatment session without increased pain on VAS  -     LTG 6 Progress [facility]     Time Calculation    PT Goal Re-Cert Due Date 11/09/17  -       User Key  (r) = Recorded By, (t) = Taken By, (c) = Cosigned By    Initials Name Provider Type    ARA Reyes PTA Physical Therapy Assistant                Therapy Education       10/25/17 1500          Therapy Education    Education Details Tputty  yellow  -ARA      Given  HEP;Symptoms/condition management;Pain management;Posture/body mechanics  -ARA      Program Progressed  -ARA      How Provided Verbal;Demonstration;Written  -ARA      Provided to Patient  -ARA      Level of Understanding Verbalized;Demonstrated  -ARA        User Key  (r) = Recorded By, (t) = Taken By, (c) = Cosigned By    Initials Name Provider Type    ARA Reyes PTA Physical Therapy Assistant                Time Calculation:   Start Time: 1430  Stop Time: 1523  Time Calculation (min): 53 min  Total Timed Code Minutes- PT: 38 minute(s)    Therapy Charges for Today     Code Description Service Date Service Provider Modifiers Qty    27560098910 HC PT THER PROC EA 15 MIN 10/25/2017 Julia Reyes PTA GP 1    46121697758 HC PT MANUAL THERAPY EA 15 MIN 10/25/2017 Julia Reyes PTA GP 2    85212764225 HC PT THER SUPP EA 15 MIN 10/25/2017 Julia Reyes PTA GP 1                    Julia Reyes PTA  10/25/2017

## 2017-10-27 ENCOUNTER — HOSPITAL ENCOUNTER (OUTPATIENT)
Dept: PHYSICAL THERAPY | Facility: HOSPITAL | Age: 67
Setting detail: THERAPIES SERIES
Discharge: HOME | End: 2017-10-27

## 2017-10-27 DIAGNOSIS — M75.21 BICEPS TENDINITIS OF RIGHT SHOULDER: ICD-10-CM

## 2017-10-27 DIAGNOSIS — Z98.890 S/P ROTATOR CUFF REPAIR: ICD-10-CM

## 2017-10-27 DIAGNOSIS — S46.012A TRAUMATIC COMPLETE TEAR OF LEFT ROTATOR CUFF, INITIAL ENCOUNTER: Primary | ICD-10-CM

## 2017-10-27 DIAGNOSIS — M19.011 PRIMARY OSTEOARTHRITIS, RIGHT SHOULDER: ICD-10-CM

## 2017-10-27 PROCEDURE — 97140 MANUAL THERAPY 1/> REGIONS: CPT

## 2017-10-27 PROCEDURE — 97110 THERAPEUTIC EXERCISES: CPT

## 2017-10-27 NOTE — THERAPY TREATMENT NOTE
Outpatient Physical Therapy Ortho Treatment Note  [facility]     Patient Name: Emerson Sofia  : 1950  MRN: [id number]  Today's Date: 10/27/2017      Subjective Improvement: 0%       Attendance: 3/3  Approved: medicare guidelines MD follow up: 17            date: 17      Visit Date: 10/27/2017    Visit Dx:    ICD-10-CM ICD-9-CM   1. Traumatic complete tear of left rotator cuff, initial encounter S46.012A 840.4   2. S/P rotator cuff repair Z98.890 V45.89   3. Biceps tendinitis of right shoulder M75.21 726.12   4. Primary osteoarthritis, right shoulder M19.011 715.11       There is no problem list on file for this patient.       Past Medical History:   Diagnosis Date   • High cholesterol         Past Surgical History:   Procedure Laterality Date   • PACEMAKER IMPLANTATION               PT Ortho       10/27/17 1100    Precautions and Contraindications    Precautions See Protocol  -NH    Contraindications Pacemaker: No IFC  -NH      10/25/17 1400    Precautions and Contraindications    Precautions See Protocol  -    Contraindications Pacemaker: No IFC  -ARA    Subjective Pain    Post-Treatment Pain Level 0  -ARA    Right Shoulder    Flexion PROM Deficit 148°  -ARA    ABduction PROM Deficit 146°  -ARA      User Key  (r) = Recorded By, (t) = Taken By, (c) = Cosigned By    Initials Name Provider Type    ARA Reyes, PTA Physical Therapy Assistant    NH Susie Watson, PT Physical Therapist                            PT Assessment/Plan       10/27/17 1000       PT Assessment    Functional Limitations Decreased safety during functional activities;Limitation in home management;Limitations in community activities;Limitations in functional capacity and performance;Performance in leisure activities;Performance in self-care ADL;Performance in work activities  -NH     Impairments Edema;Joint mobility;Muscle strength;Pain;Posture;Range of motion  -NH     Assessment Comments  PROM steadily improving. Patient has been taking Aleve and Tramadol prior to therapy which has helped guarding significantly as compared to initial evaluation. Patient presents with STR in anterior shld but desnies TTP.  -NH     Rehab Potential Good  -NH     Patient/caregiver participated in establishment of treatment plan and goals Yes  -NH     Patient would benefit from skilled therapy intervention Yes  -NH     PT Plan    PT Frequency 2x/week;3x/week  -NH     Predicted Duration of Therapy Intervention (days/wks) 8 weeks  -NH     PT Plan Comments Cont per protocol.   -NH       User Key  (r) = Recorded By, (t) = Taken By, (c) = Cosigned By    Initials Name Provider Type    NH Susie Watson, PT Physical Therapist                Modalities       10/27/17 1100          Ice    Location R shoulder  -NH      Rx Minutes 15 mins  -NH      Ice S/P Rx Yes  -NH        User Key  (r) = Recorded By, (t) = Taken By, (c) = Cosigned By    Initials Name Provider Type    NH Susie Watson, PT Physical Therapist                Exercises       10/27/17 1100 10/27/17 1000       Subjective Comments    Subjective Comments Patient states he has been having a little more pain and soreness on the back/side of his shld. States he hopes he hasn't overdone anything.   -NH      Subjective Pain    Able to rate subjective pain? yes  -NH      Pre-Treatment Pain Level 0  -NH      Post-Treatment Pain Level 0  -NH      Aquatics    Aquatics performed? No  -NH      Exercise 1    Exercise Name 1 Pendulums 4 way  -NH --  -NH     Time (Seconds) 1 1 min each  -NH --  -NH     Exercise 2    Exercise Name 2 Scap squeezes  -NH --  -NH     Sets 2 2  -NH --  -NH     Reps 2 10  -NH --  -NH     Time (Seconds) 2 5 sec hold  -NH --  -NH     Exercise 3    Exercise Name 3 Shoulder shrugs  -NH --  -NH     Sets 3 2  -NH --  -NH     Reps 3 10  -NH --  -NH     Time (Seconds) 3 5 sec hold  -NH --  -NH     Exercise 4    Exercise Name 4 AROM: elbow flexion/ext  -NH --  -NH      Reps 4 20  -NH --  -NH     Exercise 5    Exercise Name 5 AROM: pro/sup  -NH --  -NH     Sets 5 2  -NH --  -NH     Reps 5 10  -NH --  -NH     Exercise 6    Exercise Name 6 Tputty:   -NH --  -NH     Time (Minutes) 6 3 min  -NH --  -NH     Additional Comments yellow  -NH      Exercise 7    Exercise Name 7 Table slides Flexion  -NH      Sets 7 2  -NH      Reps 7 10  -NH      Additional Comments pain free range  -NH        User Key  (r) = Recorded By, (t) = Taken By, (c) = Cosigned By    Initials Name Provider Type    NH Susie Watson, PT Physical Therapist                        Manual Rx (last 36 hours)      Manual Treatments       10/27/17 1100          Manual Rx 1    Manual Rx 1 Location PROM L shoulder  -NH      Manual Rx 1 Type STM/Gentle PROM per protocol  -NH      Manual Rx 1 Duration 23 min  -NH        User Key  (r) = Recorded By, (t) = Taken By, (c) = Cosigned By    Initials Name Provider Type    NH Susie Watson, PT Physical Therapist                PT OP Goals       10/27/17 1000       PT Short Term Goals    STG Date to Achieve 11/21/17  -NH     STG 1 Patient will be IND and compliant with HEP  -NH     STG 1 Progress Ongoing  -NH     STG 2 Patient will have PROM flexion to 160 degrees  -NH     STG 2 Progress Ongoing  -NH     STG 3 Patient will have PROM abduction to 130 degrees  -NH     STG 3 Progress Met  -NH     STG 4 Patient will have PROM ER to 30 degrees  -NH     STG 4 Progress Ongoing  -NH     STG 5 Patient will reduce Quick Dash to 40  -NH     STG 5 Progress Progressing  -NH     Long Term Goals    LTG Date to Achieve 12/12/17  -NH     LTG 1 Patient will reduce Quick Dash to 20  -NH     LTG 1 Progress Ongoing  -NH     LTG 2 Patient will have R shld AROM flexion to 155 degrees  -NH     LTG 2 Progress Ongoing  -NH     LTG 3 Patient will have R shld AROM abduction to 150 degrees  -NH     LTG 3 Progress Ongoing  -NH     LTG 4 Patient will have active ER to 50 degrees measured at side  -NH     LTG  4 Progress Ongoing  -NH     LTG 5 Patient will have IR functional reach to L4/L5  -NH     LTG 5 Progress Ongoing  -NH     LTG 6 Patient will be able to tolerate 45 minute treatment session without increased pain on VAS  -NH     LTG 6 Progress Ongoing  -NH     Time Calculation    PT Goal Re-Cert Due Date 11/09/17  -NH       User Key  (r) = Recorded By, (t) = Taken By, (c) = Cosigned By    Initials Name Provider Type    NH Susie Watson, PT Physical Therapist                Therapy Education       10/27/17 1000          Therapy Education    Education Details FF table slides  -NH      Given HEP;Symptoms/condition management;Pain management;Posture/body mechanics  -NH      Program Progressed  -NH      How Provided Verbal;Demonstration;Written  -NH      Provided to Patient  -NH      Level of Understanding Verbalized;Demonstrated  -NH        User Key  (r) = Recorded By, (t) = Taken By, (c) = Cosigned By    Initials Name Provider Type    NH Susie Watson, PT Physical Therapist                Time Calculation:   Start Time: 1102  Stop Time: 1200  Time Calculation (min): 58 min  Total Timed Code Minutes- PT: 43 minute(s)    Therapy Charges for Today     Code Description Service Date Service Provider Modifiers Qty    75126124092 HC PT MANUAL THERAPY EA 15 MIN 10/27/2017 Susie Watson, PT GP 2    51889769201 HC PT THER PROC EA 15 MIN 10/27/2017 Susie Watson, PT GP 1    78203572978 HC PT THER SUPP EA 15 MIN 10/27/2017 Susie Watson, PT GP 1                    Suise Watson, PT  10/27/2017

## 2017-10-31 ENCOUNTER — HOSPITAL ENCOUNTER (OUTPATIENT)
Dept: PHYSICAL THERAPY | Facility: HOSPITAL | Age: 67
Setting detail: THERAPIES SERIES
Discharge: HOME | End: 2017-10-31

## 2017-10-31 DIAGNOSIS — Z98.890 S/P ROTATOR CUFF REPAIR: ICD-10-CM

## 2017-10-31 DIAGNOSIS — S46.012A TRAUMATIC COMPLETE TEAR OF LEFT ROTATOR CUFF, INITIAL ENCOUNTER: Primary | ICD-10-CM

## 2017-10-31 PROCEDURE — 97110 THERAPEUTIC EXERCISES: CPT

## 2017-10-31 PROCEDURE — 97140 MANUAL THERAPY 1/> REGIONS: CPT

## 2017-11-02 ENCOUNTER — HOSPITAL ENCOUNTER (OUTPATIENT)
Dept: PHYSICAL THERAPY | Facility: HOSPITAL | Age: 67
Setting detail: THERAPIES SERIES
Discharge: HOME | End: 2017-11-02

## 2017-11-02 DIAGNOSIS — S46.012A TRAUMATIC COMPLETE TEAR OF LEFT ROTATOR CUFF, INITIAL ENCOUNTER: Primary | ICD-10-CM

## 2017-11-02 DIAGNOSIS — M75.21 BICEPS TENDINITIS OF RIGHT SHOULDER: ICD-10-CM

## 2017-11-02 DIAGNOSIS — M19.011 PRIMARY OSTEOARTHRITIS, RIGHT SHOULDER: ICD-10-CM

## 2017-11-02 DIAGNOSIS — Z98.890 S/P ROTATOR CUFF REPAIR: ICD-10-CM

## 2017-11-02 PROCEDURE — 97110 THERAPEUTIC EXERCISES: CPT | Performed by: PHYSICAL THERAPY ASSISTANT

## 2017-11-02 PROCEDURE — 97140 MANUAL THERAPY 1/> REGIONS: CPT | Performed by: PHYSICAL THERAPY ASSISTANT

## 2017-11-02 NOTE — THERAPY TREATMENT NOTE
Outpatient Physical Therapy Ortho Treatment Note  [facility]     Patient Name: Emerson Sofia  : 1950  MRN: [id number]  Today's Date: 2017      Visit Date: 2017    Visits    Recert Date 17   MD appointment 17   Pt reported % of improvement  10-15     Insurance available:Medicare cap    Visit Dx:    ICD-10-CM ICD-9-CM   1. Traumatic complete tear of left rotator cuff, initial encounter S46.012A 840.4   2. S/P rotator cuff repair Z98.890 V45.89   3. Biceps tendinitis of right shoulder M75.21 726.12   4. Primary osteoarthritis, right shoulder M19.011 715.11       There is no problem list on file for this patient.       Past Medical History:   Diagnosis Date   • High cholesterol         Past Surgical History:   Procedure Laterality Date   • PACEMAKER IMPLANTATION               PT Ortho       17 1500    Precautions and Contraindications    Precautions See Protocol  -    Contraindications Pacemaker: No IFC  -    Subjective Pain    Post-Treatment Pain Level 2  -    ROM (Range of Motion)    General ROM Detail --  -    Left Shoulder    Flexion AROM Deficit 145  -    ABduction AROM Deficit 115  -JH      10/31/17 1400    Precautions and Contraindications    Precautions See Protocol  -    Contraindications Pacemaker: No IFC  -    Right Shoulder    Flexion PROM Deficit 154  -ARA    ABduction PROM Deficit 150  -ARA      User Key  (r) = Recorded By, (t) = Taken By, (c) = Cosigned By    Initials Name Provider Type     William Vasquez PTA Physical Therapy Assistant    ARA Reyes PTA Physical Therapy Assistant                            PT Assessment/Plan       17 1500       PT Assessment    Assessment Comments Pt had slight decrease in PROM this date. No new goals met.   -     PT Plan    PT Frequency 2x/week;3x/week  -     Predicted Duration of Therapy Intervention (days/wks) 8 weeks  -     PT Plan Comments Cont per MD protocol  -        User Key  (r) = Recorded By, (t) = Taken By, (c) = Cosigned By    Initials Name Provider Type    DARIA Vasquez PTA Physical Therapy Assistant                Modalities       11/02/17 1500          Ice    Location R shoulder  -      Rx Minutes 15 mins  -      Ice S/P Rx Yes  -        User Key  (r) = Recorded By, (t) = Taken By, (c) = Cosigned By    Initials Name Provider Type    DARIA Vasquez PTA Physical Therapy Assistant                Exercises       11/02/17 1500          Subjective Comments    Subjective Comments Pt reports he dont have any pain if he dont over do it.  -      Subjective Pain    Able to rate subjective pain? yes  -      Pre-Treatment Pain Level 0  -      Post-Treatment Pain Level 2  -      Exercise 1    Exercise Name 1 Pendulums 4 way  -JH      Time (Seconds) 1 1 min each  -      Exercise 2    Exercise Name 2 Scap squeezes  -JH      Sets 2 2  -JH      Reps 2 10  -JH      Time (Seconds) 2 5 sec hold  -JH      Exercise 3    Exercise Name 3 Shoulder shrugs  -JH      Sets 3 2  -JH      Reps 3 10  -JH      Time (Seconds) 3 5 sec hold  -JH      Exercise 4    Exercise Name 4 AROM: elbow flexion/ext  -JH      Reps 4 30  -JH      Exercise 5    Exercise Name 5 AROM: pro/sup  -JH      Sets 5 2  -JH      Reps 5 15  -JH      Exercise 6    Exercise Name 6 table slides flexion, scaption  -JH      Reps 6 30   ea  -JH        User Key  (r) = Recorded By, (t) = Taken By, (c) = Cosigned By    Initials Name Provider Type    DARIA Vasquez PTA Physical Therapy Assistant                        Manual Rx (last 36 hours)      Manual Treatments       11/02/17 1500          Manual Rx 1    Manual Rx 1 Location PROM L shoulder  -      Manual Rx 1 Type STM/Gentle PROM per protocol  -      Manual Rx 1 Duration 15 min  -        User Key  (r) = Recorded By, (t) = Taken By, (c) = Cosigned By    Initials Name Provider Type    DARIA Vasquez PTA Physical Therapy Assistant                PT OP  Goals       11/02/17 1500       PT Short Term Goals    STG Date to Achieve 11/21/17  -     STG 1 Patient will be IND and compliant with HEP  -     STG 1 Progress Ongoing  -     STG 2 Patient will have PROM flexion to 160 degrees  -     STG 2 Progress Ongoing  -     STG 3 Patient will have PROM abduction to 130 degrees  -     STG 3 Progress Met  -     STG 4 Patient will have PROM ER to 30 degrees  -     STG 4 Progress Ongoing  -     STG 5 Patient will reduce Quick Dash to 40  -     STG 5 Progress Progressing  -     Long Term Goals    LTG Date to Achieve 12/12/17  -     LTG 1 Patient will reduce Quick Dash to 20  -     LTG 1 Progress Ongoing  -     LTG 2 Patient will have R shld AROM flexion to 155 degrees  -     LTG 2 Progress Ongoing  -     LTG 3 Patient will have R shld AROM abduction to 150 degrees  -     LTG 3 Progress Ongoing  -     LTG 4 Patient will have active ER to 50 degrees measured at side  -     LTG 4 Progress Ongoing  -     LTG 5 Patient will have IR functional reach to L4/L5  -     LTG 5 Progress Ongoing  -     LTG 6 Patient will be able to tolerate 45 minute treatment session without increased pain on VAS  -     LTG 6 Progress Ongoing  -     Time Calculation    PT Goal Re-Cert Due Date 11/09/17  -       User Key  (r) = Recorded By, (t) = Taken By, (c) = Cosigned By    Initials Name Provider Type     William Vasquez PTA Physical Therapy Assistant                    Time Calculation:   Start Time: 1515  Stop Time: 1610  Time Calculation (min): 55 min  PT Non-Billable Time (min): 15 min    Therapy Charges for Today     Code Description Service Date Service Provider Modifiers Qty    88123006466 HC PT THER PROC EA 15 MIN 11/2/2017 William Vasquez PTA GP 2    38260961066 HC PT MANUAL THERAPY EA 15 MIN 11/2/2017 William Vasquez PTA GP 1    58655878478 HC PT THER SUPP EA 15 MIN 11/2/2017 William Vasquez PTA GP 1                    William Vasquez  PTA  11/2/2017

## 2017-11-07 ENCOUNTER — HOSPITAL ENCOUNTER (OUTPATIENT)
Dept: PHYSICAL THERAPY | Facility: HOSPITAL | Age: 67
Setting detail: THERAPIES SERIES
Discharge: HOME | End: 2017-11-07

## 2017-11-07 DIAGNOSIS — S46.012A TRAUMATIC COMPLETE TEAR OF LEFT ROTATOR CUFF, INITIAL ENCOUNTER: Primary | ICD-10-CM

## 2017-11-07 DIAGNOSIS — M75.21 BICEPS TENDINITIS OF RIGHT SHOULDER: ICD-10-CM

## 2017-11-07 DIAGNOSIS — Z98.890 S/P ROTATOR CUFF REPAIR: ICD-10-CM

## 2017-11-07 DIAGNOSIS — M19.011 PRIMARY OSTEOARTHRITIS, RIGHT SHOULDER: ICD-10-CM

## 2017-11-07 PROCEDURE — 97110 THERAPEUTIC EXERCISES: CPT

## 2017-11-07 PROCEDURE — 97140 MANUAL THERAPY 1/> REGIONS: CPT

## 2017-11-07 NOTE — THERAPY TREATMENT NOTE
Outpatient Physical Therapy Ortho Treatment Note  [facility]  Annabelle Bolanos PTA  17  5:30 PM       Patient Name: Emerson Sofia  : 1950  MRN: [id number]  Today's Date: 2017      Visit Date: 2017  Subjective Improvement:    15-20%  Attendance: / (note has prior visits)  Approved:        Medicare guidelines   MD follow up:    11/15/17       RC date:     17    Visit Dx:    ICD-10-CM ICD-9-CM   1. Traumatic complete tear of left rotator cuff, initial encounter S46.012A 840.4   2. S/P rotator cuff repair Z98.890 V45.89   3. Biceps tendinitis of right shoulder M75.21 726.12   4. Primary osteoarthritis, right shoulder M19.011 715.11       There is no problem list on file for this patient.       Past Medical History:   Diagnosis Date   • High cholesterol         Past Surgical History:   Procedure Laterality Date   • PACEMAKER IMPLANTATION               PT Ortho       17 1300    Subjective Comments    Subjective Comments Pt states only mild pain.  -PM    Precautions and Contraindications    Precautions See Protocol  -PM    Contraindications Pacemaker: No IFC  -PM    Subjective Pain    Able to rate subjective pain? yes  -PM    Pre-Treatment Pain Level 2  -PM    Post-Treatment Pain Level 0  -PM    Right Shoulder    Flexion PROM Deficit 157  -PM    ABduction PROM Deficit 157  -PM    External Rotation AROM Deficit 68 at 45 abd  -PM    Internal Rotation PROM Deficit 72 @ 45 abd  -PM      User Key  (r) = Recorded By, (t) = Taken By, (c) = Cosigned By    Initials Name Provider Type    PM Annabelle Bolanos PTA Physical Therapy Assistant                            PT Assessment/Plan       17 1300       PT Assessment    Assessment Comments Incr PROM and madhu well angela as pt had not taken pain meds. Review of post op restrictions accomplished as pt was noted to actively raise his arm.  -PM     Rehab Potential Good  -PM     Patient/caregiver participated in  establishment of treatment plan and goals Yes  -PM     Patient would benefit from skilled therapy intervention Yes  -PM     PT Plan    PT Frequency 2x/week;3x/week  -PM     Predicted Duration of Therapy Intervention (days/wks) 8 weeks  -PM     PT Plan Comments reassess; follow MD protocol(see chart); pt sched appts (left without)  -PM       User Key  (r) = Recorded By, (t) = Taken By, (c) = Cosigned By    Initials Name Provider Type    PM Annabelle Bolanos PTA Physical Therapy Assistant                Modalities       11/07/17 1300          Ice    Location R shoulder  -PM      Rx Minutes 12 mins  -PM      Ice S/P Rx Yes  -PM        User Key  (r) = Recorded By, (t) = Taken By, (c) = Cosigned By    Initials Name Provider Type    PM Annabelle Bolanos PTA Physical Therapy Assistant                Exercises       11/07/17 1300          Subjective Comments    Subjective Comments Pt states only mild pain.  -PM      Subjective Pain    Able to rate subjective pain? yes  -PM      Pre-Treatment Pain Level 2  -PM      Post-Treatment Pain Level 0  -PM      Exercise 1    Exercise Name 1 Pendulums 4 way  -PM      Time (Seconds) 1 1 min each  -PM      Exercise 2    Exercise Name 2 Scap squeezes  -PM      Sets 2 2  -PM      Reps 2 10  -PM      Time (Seconds) 2 5 sec hold  -PM      Exercise 3    Exercise Name 3 Shoulder shrugs  -PM      Sets 3 2  -PM      Reps 3 10  -PM      Time (Seconds) 3 5 sec hold  -PM      Exercise 4    Exercise Name 4 AROM: elbow flexion/ext  -PM      Reps 4 30  -PM      Exercise 5    Exercise Name 5 AROM: pro/sup  -PM      Sets 5 2  -PM      Reps 5 15  -PM      Exercise 6    Exercise Name 6 table slides flexion, scaption  -PM      Reps 6 30   ea  -PM        User Key  (r) = Recorded By, (t) = Taken By, (c) = Cosigned By    Initials Name Provider Type    PM Annabelle Bolanos PTA Physical Therapy Assistant                        Manual Rx (last 36 hours)      Manual Treatments       11/07/17 1300           Manual Rx 1    Manual Rx 1 Location PROM L shoulder  -PM      Manual Rx 1 Type STM/Gentle PROM per protocol  -PM      Manual Rx 1 Duration 15 min  -PM        User Key  (r) = Recorded By, (t) = Taken By, (c) = Cosigned By    Initials Name Provider Type    PM Annabelle Bolanos PTA Physical Therapy Assistant                PT OP Goals       11/07/17 1300       PT Short Term Goals    STG Date to Achieve 11/21/17  -PM     STG 1 Patient will be IND and compliant with HEP  -PM     STG 1 Progress Progressing  -PM     STG 2 Patient will have PROM flexion to 160 degrees  -PM     STG 2 Progress Progressing  -PM     STG 3 Patient will have PROM abduction to 130 degrees  -PM     STG 3 Progress Met  -PM     STG 4 Patient will have PROM ER to 30 degrees  -PM     STG 4 Progress Met  -PM     STG 5 Patient will reduce Quick Dash to 40  -PM     STG 5 Progress Progressing  -PM     Long Term Goals    LTG Date to Achieve 12/12/17  -PM     LTG 1 Patient will reduce Quick Dash to 20  -PM     LTG 1 Progress Ongoing  -PM     LTG 2 Patient will have R shld AROM flexion to 155 degrees  -PM     LTG 2 Progress Ongoing  -PM     LTG 3 Patient will have R shld AROM abduction to 150 degrees  -PM     LTG 3 Progress Ongoing  -PM     LTG 4 Patient will have active ER to 50 degrees measured at side  -PM     LTG 4 Progress Ongoing  -PM     LTG 5 Patient will have IR functional reach to L4/L5  -PM     LTG 5 Progress Ongoing  -PM     LTG 6 Patient will be able to tolerate 45 minute treatment session without increased pain on VAS  -PM     LTG 6 Progress Ongoing  -PM     Time Calculation    PT Goal Re-Cert Due Date 11/09/17  -PM       User Key  (r) = Recorded By, (t) = Taken By, (c) = Cosigned By    Initials Name Provider Type    PM Annabelle Bolanos PTA Physical Therapy Assistant                Therapy Education       11/07/17 1300          Therapy Education    Given HEP;Symptoms/condition management;Pain management;Posture/body mechanics   postop  restrictions reviewed  -PM      Program Reinforced  -PM      How Provided Verbal;Demonstration;Written  -PM      Provided to Patient  -PM      Level of Understanding Verbalized;Demonstrated  -PM        User Key  (r) = Recorded By, (t) = Taken By, (c) = Cosigned By    Initials Name Provider Type    PM Annabelle Bolanos PTA Physical Therapy Assistant                Time Calculation:   Start Time: 1347  Stop Time: 1435  Time Calculation (min): 48 min    Therapy Charges for Today     Code Description Service Date Service Provider Modifiers Qty    95073097017 HC PT THER PROC EA 15 MIN 11/7/2017 Annabelle Bolanos PTA GP 1    49535044240 HC PT MANUAL THERAPY EA 15 MIN 11/7/2017 Annabelle Bolanos PTA GP 1    48244961905 HC PT THER SUPP EA 15 MIN 11/7/2017 Annabelle Bolanos PTA GP 1                    Annabelle Bolanos PTA  11/7/2017

## 2017-11-09 ENCOUNTER — HOSPITAL ENCOUNTER (OUTPATIENT)
Dept: PHYSICAL THERAPY | Facility: HOSPITAL | Age: 67
Setting detail: THERAPIES SERIES
Discharge: HOME | End: 2017-11-09

## 2017-11-09 DIAGNOSIS — Z98.890 S/P ROTATOR CUFF REPAIR: ICD-10-CM

## 2017-11-09 DIAGNOSIS — S46.011A TRAUMATIC COMPLETE TEAR OF RIGHT ROTATOR CUFF, INITIAL ENCOUNTER: Primary | ICD-10-CM

## 2017-11-09 PROCEDURE — 97140 MANUAL THERAPY 1/> REGIONS: CPT

## 2017-11-09 PROCEDURE — 97110 THERAPEUTIC EXERCISES: CPT

## 2017-11-09 PROCEDURE — G8985 CARRY GOAL STATUS: HCPCS

## 2017-11-09 PROCEDURE — G8984 CARRY CURRENT STATUS: HCPCS

## 2017-11-10 NOTE — THERAPY PROGRESS REPORT/RE-CERT
Outpatient Physical Therapy Ortho Progress Note  [facility]     Patient Name: Emerson Sofia  : 1950  MRN: [id number]  Today's Date: 2017      Subjective Improvement:    15-20%  Attendance:  (note has prior visits)  Approved:        Medicare guidelines   MD follow up:    11/15/17       RC date:     17    Visit Date: 2017    There is no problem list on file for this patient.       Past Medical History:   Diagnosis Date   • High cholesterol         Past Surgical History:   Procedure Laterality Date   • PACEMAKER IMPLANTATION         Visit Dx:     ICD-10-CM ICD-9-CM   1. Traumatic complete tear of right rotator cuff, initial encounter S46.011A 840.4   2. S/P rotator cuff repair Z98.890 V45.89                 PT Ortho       17 1300    Precautions and Contraindications    Precautions See Protocol  -NH    Contraindications Pacemaker: No IFC  -NH    Posture/Observations    Posture/Observations Comments Ecchymosis present along prioximal bicep muscle belly. TTP and soft tissue restrictions noted at deltoid and biceps.  -NH    Right Shoulder    Flexion PROM Deficit 144  -NH    ABduction PROM Deficit 134  -NH    External Rotation PROM Deficit 65  -NH    Internal Rotation PROM Deficit 82  -NH    MMT (Manual Muscle Testing)    General MMT Assessment Detail Deferred due to post op status  -NH      17 1300    Subjective Comments    Subjective Comments Pt states only mild pain.  -PM    Precautions and Contraindications    Precautions See Protocol  -PM    Contraindications Pacemaker: No IFC  -PM    Subjective Pain    Able to rate subjective pain? yes  -PM    Pre-Treatment Pain Level 2  -PM    Post-Treatment Pain Level 0  -PM    Right Shoulder    Flexion PROM Deficit 157  -PM    ABduction PROM Deficit 157  -PM    External Rotation AROM Deficit 68 at 45 abd  -PM    Internal Rotation PROM Deficit 72 @ 45 abd  -PM      User Key  (r) = Recorded By, (t) = Taken By, (c) = Cosigned  By    Initials Name Provider Type    PM Annabelle Bolanos, PTA Physical Therapy Assistant    NH Susie Watson, PT Physical Therapist                            Therapy Education       11/09/17 1400          Therapy Education    Given HEP;Symptoms/condition management;Pain management;Posture/body mechanics  -NH      Program Reinforced  -NH      How Provided Verbal;Demonstration;Written  -NH      Provided to Patient  -NH      Level of Understanding Verbalized;Demonstrated  -NH        User Key  (r) = Recorded By, (t) = Taken By, (c) = Cosigned By    Initials Name Provider Type    NH Susie Watson, PT Physical Therapist                PT OP Goals       11/09/17 1400       PT Short Term Goals    STG Date to Achieve 11/21/17  -NH     STG 1 Patient will be IND and compliant with HEP  -NH     STG 1 Progress Progressing  -NH     STG 2 Patient will have PROM flexion to 160 degrees  -NH     STG 2 Progress Progressing  -NH     STG 3 Patient will have PROM abduction to 130 degrees  -NH     STG 3 Progress Met  -NH     STG 4 Patient will have PROM ER to 30 degrees  -NH     STG 4 Progress Met  -NH     STG 5 Patient will reduce Quick Dash to 40  -NH     STG 5 Progress Progressing  -NH     Long Term Goals    LTG Date to Achieve 12/12/17  -NH     LTG 1 Patient will reduce Quick Dash to 20  -NH     LTG 1 Progress Ongoing  -NH     LTG 2 Patient will have R shld AROM flexion to 155 degrees  -NH     LTG 2 Progress Ongoing  -NH     LTG 3 Patient will have R shld AROM abduction to 150 degrees  -NH     LTG 3 Progress Ongoing  -NH     LTG 4 Patient will have active ER to 50 degrees measured at side  -NH     LTG 4 Progress Ongoing  -NH     LTG 5 Patient will have IR functional reach to L4/L5  -NH     LTG 5 Progress Ongoing  -NH     LTG 6 Patient will be able to tolerate 45 minute treatment session without increased pain on VAS  -NH     LTG 6 Progress Ongoing  -NH     Time Calculation    PT Goal Re-Cert Due Date 11/30/17  -NH       User  "Key  (r) = Recorded By, (t) = Taken By, (c) = Cosigned By    Initials Name Provider Type    NH Susie Watson, PT Physical Therapist                PT Assessment/Plan       11/09/17 1400       PT Assessment    Functional Limitations Decreased safety during functional activities;Limitation in home management;Limitations in community activities;Limitations in functional capacity and performance;Performance in leisure activities;Performance in self-care ADL;Performance in work activities  -NH     Impairments Edema;Joint mobility;Muscle strength;Pain;Posture;Range of motion  -NH     Assessment Comments Patient has been progressing steadily according to MD protocol. During previous therapy session, Patient felt a \"pop\" in his R shoulder with PROM. Upon recheck a couple days later, patient is noted to have a bruise on anterior shoulder along proximal biceps. Patient reports increased discomfort in this area with palpation. Patient noted to have slight decrease in PROM as compared to previous visits. PT attempted to call MD regarding this but MD unable to be reached. PT will attempt to call again following day. Patient would benefit from continuing skilled physical therapy to address deficits and advance patient according to MD protocol.   -NH     Rehab Potential Good  -NH     Patient/caregiver participated in establishment of treatment plan and goals Yes  -NH     Patient would benefit from skilled therapy intervention Yes  -NH     PT Plan    PT Frequency 2x/week;3x/week  -NH     Predicted Duration of Therapy Intervention (days/wks) 8 weeks  -NH     PT Plan Comments f/u on increased symptoms and bruising R shld.  -NH       User Key  (r) = Recorded By, (t) = Taken By, (c) = Cosigned By    Initials Name Provider Type    NH Susie Watson, PT Physical Therapist                Modalities       11/09/17 1300          Ice    Location R shoulder  -NH      Rx Minutes 15 mins  -NH      Ice S/P Rx Yes  -NH        User Key  (r) = " Recorded By, (t) = Taken By, (c) = Cosigned By    Initials Name Provider Type    NH Susie Watson, PT Physical Therapist              Exercises       11/09/17 1300          Subjective Comments    Subjective Comments Patient reports that he has a bruise on his arm. He felt a pop during passive range of last therapy session. It has been feeling more sensitivv since then.  Patient denies increased pain with any particular motions.  -NH      Subjective Pain    Able to rate subjective pain? yes  -NH      Pre-Treatment Pain Level 3  -NH      Aquatics    Aquatics performed? No  -NH      Exercise 1    Exercise Name 1 Pendulums 4 way  -NH      Time (Seconds) 1 1 min each  -NH      Exercise 2    Exercise Name 2 Scap squeezes  -NH      Sets 2 2  -NH      Reps 2 10  -NH      Time (Seconds) 2 5 sec hold  -NH      Exercise 3    Exercise Name 3 Shoulder shrugs  -NH      Sets 3 2  -NH      Reps 3 10  -NH      Time (Seconds) 3 5 sec hold  -NH      Exercise 4    Exercise Name 4 AROM: elbow flexion/ext  -NH      Reps 4 30  -NH      Exercise 5    Exercise Name 5 AROM: pro/sup  -NH      Sets 5 2  -NH      Reps 5 15  -NH      Exercise 6    Exercise Name 6 table slides flexion, scaption  -NH      Reps 6 30   ea  -NH        User Key  (r) = Recorded By, (t) = Taken By, (c) = Cosigned By    Initials Name Provider Type    NH Susie Watson, PT Physical Therapist           Manual Rx (last 36 hours)      Manual Treatments       11/09/17 1300          Manual Rx 1    Manual Rx 1 Location PROM L shoulder  -NH      Manual Rx 1 Type STM/Gentle PROM per protocol  -NH      Manual Rx 1 Duration 23 min  -NH        User Key  (r) = Recorded By, (t) = Taken By, (c) = Cosigned By    Initials Name Provider Type    NH Susie Watson, PT Physical Therapist                            Outcome Measures       11/09/17 1400          Quick DASH    Open a tight or new jar. 3  -NH      Do heavy household chores (e.g., wash walls, wash floors) 3  -NH      Carry a  shopping bag or briefcase 3  -NH      Wash your back 2  -NH      Use a knife to cut food 1  -NH      Recreational activities in which you take some force or impact through your arm, should or hand (e.g. golf, hammering, tennis, etc.) 3  -NH      During the past week, to what extent has your arm, shoulder, or hand problem interfered with your normal social activites with family, friends, neighbors or groups? 3  -NH      During the past week, were you limited in your work or other regular daily activities as a result of your arm, shoulder or hand problem? 3  -NH      Arm, Shoulder, or hand pain 3  -NH      Tingling (pins and needles) in your arm, shoulder, or hand 2  -NH      During the past week, how much difficulty have you had sleeping because of the pain in your arm, shoulder or hand? 2  -NH      Number of Questions Answered 11  -NH      Quick DASH Score 38.64  -NH      Functional Assessment    Outcome Measure Options Quick DASH  -NH        User Key  (r) = Recorded By, (t) = Taken By, (c) = Cosigned By    Initials Name Provider Type    NH Susie Watson, PT Physical Therapist            Time Calculation:   Start Time: 1347  Stop Time: 1445  Time Calculation (min): 58 min  Total Timed Code Minutes- PT: 43 minute(s)     Therapy Charges for Today     Code Description Service Date Service Provider Modifiers Qty    00460402949 HC PT CARRY MOV HAND OBJ CURRENT 11/9/2017 Susie Watson, PT GP,  1    36403791821 HC PT CARRY MOV HAND OBJ PROJECTED 11/9/2017 Susie Watson, PT GP,  1    24618826748 HC PT MANUAL THERAPY EA 15 MIN 11/9/2017 Susie Watson PT GP 2    50683533052 HC PT THER PROC EA 15 MIN 11/9/2017 Susie Watson PT GP 1    70985259530 HC PT THER SUPP EA 15 MIN 11/9/2017 Susie Watson, PT GP 1          PT G-Codes  PT Professional Judgement Used?: Yes  Outcome Measure Options: Quick DASH  Score: 38.64  Functional Limitation: Carrying, moving and handling objects  Carrying, Moving and Handling  Objects Current Status (): At least 80 percent but less than 100 percent impaired, limited or restricted  Carrying, Moving and Handling Objects Goal Status (): At least 20 percent but less than 40 percent impaired, limited or restricted         Susie Watson, PT  11/9/2017

## 2017-11-13 ENCOUNTER — HOSPITAL ENCOUNTER (OUTPATIENT)
Dept: PHYSICAL THERAPY | Facility: HOSPITAL | Age: 67
Setting detail: THERAPIES SERIES
Discharge: HOME | End: 2017-11-13

## 2017-11-13 DIAGNOSIS — Z98.890 S/P ROTATOR CUFF REPAIR: ICD-10-CM

## 2017-11-13 DIAGNOSIS — S46.011A TRAUMATIC COMPLETE TEAR OF RIGHT ROTATOR CUFF, INITIAL ENCOUNTER: Primary | ICD-10-CM

## 2017-11-13 PROCEDURE — 97140 MANUAL THERAPY 1/> REGIONS: CPT

## 2017-11-13 PROCEDURE — 97110 THERAPEUTIC EXERCISES: CPT

## 2017-11-16 ENCOUNTER — HOSPITAL ENCOUNTER (OUTPATIENT)
Dept: PHYSICAL THERAPY | Facility: HOSPITAL | Age: 67
Setting detail: THERAPIES SERIES
Discharge: HOME | End: 2017-11-16

## 2017-11-16 DIAGNOSIS — Z98.890 S/P ROTATOR CUFF REPAIR: ICD-10-CM

## 2017-11-16 DIAGNOSIS — M19.011 PRIMARY OSTEOARTHRITIS, RIGHT SHOULDER: ICD-10-CM

## 2017-11-16 DIAGNOSIS — S46.011A TRAUMATIC COMPLETE TEAR OF RIGHT ROTATOR CUFF, INITIAL ENCOUNTER: Primary | ICD-10-CM

## 2017-11-16 DIAGNOSIS — S46.012A TRAUMATIC COMPLETE TEAR OF LEFT ROTATOR CUFF, INITIAL ENCOUNTER: ICD-10-CM

## 2017-11-16 DIAGNOSIS — M75.21 BICEPS TENDINITIS OF RIGHT SHOULDER: ICD-10-CM

## 2017-11-16 PROCEDURE — 97140 MANUAL THERAPY 1/> REGIONS: CPT

## 2017-11-16 PROCEDURE — 97110 THERAPEUTIC EXERCISES: CPT

## 2017-11-16 NOTE — THERAPY TREATMENT NOTE
"    Outpatient Physical Therapy Ortho Treatment Note  [facility]     Patient Name: Emerson Sofia  : 1950  MRN: [id number]  Today's Date: 2017      Visit Date: 2017     Subjective Improvement: 15-20%       Attendance:   Approved: medicare guidelines           MD follow up: Ask next          RC date: 17       Visit Dx:    ICD-10-CM ICD-9-CM   1. Traumatic complete tear of right rotator cuff, initial encounter S46.011A 840.4   2. S/P rotator cuff repair Z98.890 V45.89   3. Traumatic complete tear of left rotator cuff, initial encounter S46.012A 840.4   4. Biceps tendinitis of right shoulder M75.21 726.12   5. Primary osteoarthritis, right shoulder M19.011 715.11       There is no problem list on file for this patient.       Past Medical History:   Diagnosis Date   • High cholesterol         Past Surgical History:   Procedure Laterality Date   • PACEMAKER IMPLANTATION               PT Ortho       17 1200    Subjective Comments    Subjective Comments Pt states that MD visit yesterday went well. \"Evyerthing looked good.\"  -MB    Precautions and Contraindications    Precautions See Protocol  -MB    Contraindications Pacemaker: No IFC  -MB    Subjective Pain    Able to rate subjective pain? yes  -MB    Pre-Treatment Pain Level 2  -MB    Post-Treatment Pain Level 0  -MB    Right Shoulder    Flexion PROM Deficit 158°  -MB    ABduction PROM Deficit 165°  -MB      User Key  (r) = Recorded By, (t) = Taken By, (c) = Cosigned By    Initials Name Provider Type    JULIANNA Colin, PTA Physical Therapy Assistant                            PT Assessment/Plan       17 1300       PT Assessment    Functional Limitations Decreased safety during functional activities;Limitation in home management;Limitations in community activities;Limitations in functional capacity and performance;Performance in leisure activities;Performance in self-care ADL;Performance in work activities  -MB " "    Impairments Edema;Joint mobility;Muscle strength;Pain;Posture;Range of motion  -MB     Assessment Comments Pt does very well with new SOFIE mcneill today. Pt able to increase PROM in R shoulder FF/abd this date. Progressing well per MD protocol.  -MB     Rehab Potential Good  -MB     Patient/caregiver participated in establishment of treatment plan and goals Yes  -MB     Patient would benefit from skilled therapy intervention Yes  -MB     PT Plan    PT Frequency 2x/week;3x/week  -MB     Predicted Duration of Therapy Intervention (days/wks) 8 weeks  -MB     PT Plan Comments Continue per MD protocol.   -MB       User Key  (r) = Recorded By, (t) = Taken By, (c) = Cosigned By    Initials Name Provider Type    JULIANNA Colin PTA Physical Therapy Assistant                Modalities       11/16/17 1200          Ice    Ice Applied Yes  -MB      Location R shoulder  -MB      Rx Minutes 15 mins  -MB      Ice S/P Rx Yes  -MB        User Key  (r) = Recorded By, (t) = Taken By, (c) = Cosigned By    Initials Name Provider Type    JULIANNA Colin PTA Physical Therapy Assistant                Exercises       11/16/17 1200          Subjective Comments    Subjective Comments Pt states that MD visit yesterday went well. \"Evyerthing looked good.\"  -MB      Subjective Pain    Able to rate subjective pain? yes  -MB      Pre-Treatment Pain Level 2  -MB      Post-Treatment Pain Level 0  -MB      Aquatics    Aquatics performed? No  -MB      Exercise 1    Exercise Name 1 Pendulums 4 way  -MB      Time (Seconds) 1 1 min each  -MB      Exercise 2    Exercise Name 2 Pulleys FF/scap  -MB      Time (Minutes) 2 2  -MB      Exercise 3    Exercise Name 3 Wall slides FF  -MB      Sets 3 2  -MB      Reps 3 10  -MB      Exercise 4    Exercise Name 4 AROM: elbow flexion/ext  -MB      Reps 4 30  -MB      Exercise 5    Exercise Name 5 AROM: pro/sup  -MB      Sets 5 2  -MB      Reps 5 15  -MB      Exercise 6    Exercise Name 6 Table slides " Scap/abd  -MB      Sets 6 2  -MB      Reps 6 10  -MB      Exercise 7    Exercise Name 7 SOFIE Browning flexion/abd  -MB      Sets 7 2  -MB      Reps 7 10  -MB        User Key  (r) = Recorded By, (t) = Taken By, (c) = Cosigned By    Initials Name Provider Type    JULIANNA Colin PTA Physical Therapy Assistant                        Manual Rx (last 36 hours)      Manual Treatments       11/16/17 1300          Manual Rx 1    Manual Rx 1 Location PROM L shoulder  -MB      Manual Rx 1 Type STM/Gentle PROM per protocol  -MB      Manual Rx 1 Duration 15 min  -MB        User Key  (r) = Recorded By, (t) = Taken By, (c) = Cosigned By    Initials Name Provider Type    JULIANNA Colin PTA Physical Therapy Assistant                PT OP Goals       11/16/17 1200       PT Short Term Goals    STG Date to Achieve 11/21/17  -MB     STG 1 Patient will be IND and compliant with HEP  -MB     STG 1 Progress Progressing  -MB     STG 2 Patient will have PROM flexion to 160 degrees  -MB     STG 2 Progress Progressing  -MB     STG 3 Patient will have PROM abduction to 130 degrees  -MB     STG 3 Progress Met  -MB     STG 4 Patient will have PROM ER to 30 degrees  -MB     STG 4 Progress Met  -MB     STG 5 Patient will reduce Quick Dash to 40  -MB     STG 5 Progress Progressing  -MB     Long Term Goals    LTG Date to Achieve 12/12/17  -MB     LTG 1 Patient will reduce Quick Dash to 20  -MB     LTG 1 Progress Ongoing  -MB     LTG 2 Patient will have R shld AROM flexion to 155 degrees  -MB     LTG 2 Progress Ongoing  -MB     LTG 3 Patient will have R shld AROM abduction to 150 degrees  -MB     LTG 3 Progress Ongoing  -MB     LTG 4 Patient will have active ER to 50 degrees measured at side  -MB     LTG 4 Progress Ongoing  -MB     LTG 5 Patient will have IR functional reach to L4/L5  -MB     LTG 5 Progress Ongoing  -MB     LTG 6 Patient will be able to tolerate 45 minute treatment session without increased pain on VAS  -MB     LTG 6 Progress  Ongoing  -MB     Time Calculation    PT Goal Re-Cert Due Date 11/30/17  -MB       User Key  (r) = Recorded By, (t) = Taken By, (c) = Cosigned By    Initials Name Provider Type    JULIANNA Colin PTA Physical Therapy Assistant                Therapy Education       11/16/17 1300          Therapy Education    Given HEP;Symptoms/condition management;Pain management;Posture/body mechanics  -MB      Program Reinforced  -MB      How Provided Verbal;Demonstration;Written  -MB      Provided to Patient  -MB      Level of Understanding Verbalized;Demonstrated  -MB        User Key  (r) = Recorded By, (t) = Taken By, (c) = Cosigned By    Initials Name Provider Type    JULIANNA Colin PTA Physical Therapy Assistant                Time Calculation:   Start Time: 1258  Stop Time: 1356  Time Calculation (min): 58 min  Total Timed Code Minutes- PT: 43 minute(s)    Therapy Charges for Today     Code Description Service Date Service Provider Modifiers Qty    74444771470 HC PT THER PROC EA 15 MIN 11/16/2017 Filemon Colin PTA GP 2    07936318384 HC PT MANUAL THERAPY EA 15 MIN 11/16/2017 Filemon Colin PTA GP 1    96398973214 HC PT THER SUPP EA 15 MIN 11/16/2017 Filemon Colin PTA GP 1                    Filemon Colin PTA  11/16/2017

## 2017-11-20 ENCOUNTER — HOSPITAL ENCOUNTER (OUTPATIENT)
Dept: PHYSICAL THERAPY | Facility: HOSPITAL | Age: 67
Setting detail: THERAPIES SERIES
Discharge: HOME | End: 2017-11-20

## 2017-11-20 DIAGNOSIS — M19.011 PRIMARY OSTEOARTHRITIS, RIGHT SHOULDER: ICD-10-CM

## 2017-11-20 DIAGNOSIS — S46.012A TRAUMATIC COMPLETE TEAR OF LEFT ROTATOR CUFF, INITIAL ENCOUNTER: ICD-10-CM

## 2017-11-20 DIAGNOSIS — S46.011A TRAUMATIC COMPLETE TEAR OF RIGHT ROTATOR CUFF, INITIAL ENCOUNTER: Primary | ICD-10-CM

## 2017-11-20 DIAGNOSIS — M75.21 BICEPS TENDINITIS OF RIGHT SHOULDER: ICD-10-CM

## 2017-11-20 DIAGNOSIS — Z98.890 S/P ROTATOR CUFF REPAIR: ICD-10-CM

## 2017-11-20 PROCEDURE — 97110 THERAPEUTIC EXERCISES: CPT

## 2017-11-20 PROCEDURE — 97140 MANUAL THERAPY 1/> REGIONS: CPT

## 2017-11-20 NOTE — THERAPY TREATMENT NOTE
Outpatient Physical Therapy Ortho Treatment Note  [facility]  Julia Reyes PTA  17  2:41 PM       Patient Name: Emerson Sofia  : 1950  MRN: [id number]  Today's Date: 2017      Visit Date: 2017    Subjective Improvement: 25-30%       Attendance:    Approved: medicare guidelines           MD follow up: Lorenamedaniel            date: 17         Visit Dx:    ICD-10-CM ICD-9-CM   1. Traumatic complete tear of right rotator cuff, initial encounter S46.011A 840.4   2. S/P rotator cuff repair Z98.890 V45.89   3. Traumatic complete tear of left rotator cuff, initial encounter S46.012A 840.4   4. Biceps tendinitis of right shoulder M75.21 726.12   5. Primary osteoarthritis, right shoulder M19.011 715.11       There is no problem list on file for this patient.       Past Medical History:   Diagnosis Date   • High cholesterol         Past Surgical History:   Procedure Laterality Date   • PACEMAKER IMPLANTATION               PT Ortho       17 1400    Precautions and Contraindications    Precautions See Protocol  -    Contraindications Pacemaker: No IFC  -ARA    Subjective Pain    Post-Treatment Pain Level 0  -ARA    Right Shoulder    Flexion PROM Deficit 152  -ARA    ABduction PROM Deficit 156  -ARA      User Key  (r) = Recorded By, (t) = Taken By, (c) = Cosigned By    Initials Name Provider Type     Julia Reyes PTA Physical Therapy Assistant                            PT Assessment/Plan       17 1400       PT Assessment    Functional Limitations Decreased safety during functional activities;Limitation in home management;Limitations in community activities;Limitations in functional capacity and performance;Performance in leisure activities;Performance in self-care ADL;Performance in work activities  -     Impairments Edema;Joint mobility;Muscle strength;Pain;Posture;Range of motion  -     Assessment Comments Slight decrease in PROM today. Did  well with all AA exercises. Verbalized understanding to HEP.  -ARA     Rehab Potential Good  -ARA     Patient/caregiver participated in establishment of treatment plan and goals Yes  -ARA     Patient would benefit from skilled therapy intervention Yes  -ARA     PT Plan    PT Frequency 2x/week;3x/week  -ARA     Predicted Duration of Therapy Intervention (days/wks) 8 weeks  -ARA     PT Plan Comments Cont w/ phase II of MD protocol  -ARA       User Key  (r) = Recorded By, (t) = Taken By, (c) = Cosigned By    Initials Name Provider Type    ARA Reyes PTA Physical Therapy Assistant                Modalities       11/20/17 1400          Ice    Ice Applied Yes  -ARA      Location R shoulder  -ARA      Rx Minutes 15 mins  -ARA      Ice S/P Rx Yes  -ARA        User Key  (r) = Recorded By, (t) = Taken By, (c) = Cosigned By    Initials Name Provider Type    ARA Reyes PTA Physical Therapy Assistant                Exercises       11/20/17 1400          Subjective Comments    Subjective Comments pt states that he is feeling good- no complaints  -ARA      Subjective Pain    Able to rate subjective pain? yes  -ARA      Pre-Treatment Pain Level 2  -ARA      Post-Treatment Pain Level 0  -ARA      Aquatics    Aquatics performed? No  -ARA      Exercise 1    Exercise Name 1 PRO II AA  -ARA      Time (Minutes) 1 8 min  -ARA      Exercise 2    Exercise Name 2 Pulleys FF/scap  -ARA      Time (Minutes) 2 2 min each  -ARA      Exercise 3    Exercise Name 3 Pendulums 4 way  -ARA      Time (Minutes) 3 1 min each  -ARA      Exercise 4    Exercise Name 4 Scap squeezes  -ARA      Sets 4 2  -ARA      Reps 4 10  -ARA      Exercise 5    Exercise Name 5 Bicep curls  -ARA      Sets 5 2  -ARA      Reps 5 10  -ARA      Additional Comments 1# DB  -ARA      Exercise 6    Exercise Name 6 Wall slides FF  -ARA      Sets 6 2  -ARA      Reps 6 10  -ARA      Exercise 7    Exercise Name 7 Supine AA wand flexion  -ARA      Sets 7 2  -ARA      Reps 7 10  -ARA      Exercise  8    Exercise Name 8 Supine AA wand abd  -      Sets 8 2  -ARA      Reps 8 10  -ARA        User Key  (r) = Recorded By, (t) = Taken By, (c) = Cosigned By    Initials Name Provider Type    ARA Reyes PTA Physical Therapy Assistant                        Manual Rx (last 36 hours)      Manual Treatments       11/20/17 1300          Manual Rx 1    Manual Rx 1 Location PROM L shoulder  -      Manual Rx 1 Type STM/Gentle PROM per protocol  -      Manual Rx 1 Duration 10 min  -        User Key  (r) = Recorded By, (t) = Taken By, (c) = Cosigned By    Initials Name Provider Type    ARA Reyes PTA Physical Therapy Assistant                PT OP Goals       11/20/17 1400       PT Short Term Goals    STG Date to Achieve 11/21/17  -     STG 1 Patient will be IND and compliant with HEP  -     STG 1 Progress Progressing  -     STG 2 Patient will have PROM flexion to 160 degrees  -     STG 2 Progress Progressing  -     STG 3 Patient will have PROM abduction to 130 degrees  -     STG 3 Progress Met  -     STG 4 Patient will have PROM ER to 30 degrees  -     STG 4 Progress Met  -     STG 5 Patient will reduce Quick Dash to 40  -     STG 5 Progress Progressing  -     Long Term Goals    LTG Date to Achieve 12/12/17  -     LTG 1 Patient will reduce Quick Dash to 20  -     LTG 1 Progress Ongoing  -     LTG 2 Patient will have R shld AROM flexion to 155 degrees  -     LTG 2 Progress Ongoing  -     LTG 3 Patient will have R shld AROM abduction to 150 degrees  -     LTG 3 Progress Ongoing  -     LTG 4 Patient will have active ER to 50 degrees measured at side  -     LTG 4 Progress Ongoing  -     LTG 5 Patient will have IR functional reach to L4/L5  -     LTG 5 Progress Ongoing  -     LTG 6 Patient will be able to tolerate 45 minute treatment session without increased pain on VAS  -[facility] 6 Progress Ongoing  [facility]     Time Calculation    PT Goal Re-Cert Due Date  11/30/17  -ARA       User Key  (r) = Recorded By, (t) = Taken By, (c) = Cosigned By    Initials Name Provider Type    ARA Reyes PTA Physical Therapy Assistant                    Time Calculation:   Start Time: 1350  Stop Time: 1447  Time Calculation (min): 57 min  Total Timed Code Minutes- PT: 42 minute(s)    Therapy Charges for Today     Code Description Service Date Service Provider Modifiers Qty    11397090793 HC PT THER PROC EA 15 MIN 11/20/2017 Julia Reyes, ANALIA GP 2    16843977367 HC PT MANUAL THERAPY EA 15 MIN 11/20/2017 Julia Reyes, ANALIA GP 1    16749317044 HC PT THER SUPP EA 15 MIN 11/20/2017 Julia Reyes, ANALIA GP 1                    Julia Reyes PTA  11/20/2017

## 2017-11-22 ENCOUNTER — HOSPITAL ENCOUNTER (OUTPATIENT)
Dept: PHYSICAL THERAPY | Facility: HOSPITAL | Age: 67
Setting detail: THERAPIES SERIES
Discharge: HOME | End: 2017-11-22

## 2017-11-22 DIAGNOSIS — S46.012A TRAUMATIC COMPLETE TEAR OF LEFT ROTATOR CUFF, INITIAL ENCOUNTER: ICD-10-CM

## 2017-11-22 DIAGNOSIS — S46.011A TRAUMATIC COMPLETE TEAR OF RIGHT ROTATOR CUFF, INITIAL ENCOUNTER: Primary | ICD-10-CM

## 2017-11-22 DIAGNOSIS — Z98.890 S/P ROTATOR CUFF REPAIR: ICD-10-CM

## 2017-11-22 DIAGNOSIS — M19.011 PRIMARY OSTEOARTHRITIS, RIGHT SHOULDER: ICD-10-CM

## 2017-11-22 DIAGNOSIS — M75.21 BICEPS TENDINITIS OF RIGHT SHOULDER: ICD-10-CM

## 2017-11-22 PROCEDURE — 97140 MANUAL THERAPY 1/> REGIONS: CPT

## 2017-11-22 PROCEDURE — 97110 THERAPEUTIC EXERCISES: CPT

## 2017-11-22 NOTE — THERAPY TREATMENT NOTE
Outpatient Physical Therapy Ortho Treatment Note  [facility]     Patient Name: Emerson Sofia  : 1950  MRN: [id number]  Today's Date: 2017      Visit Date: 2017    Subjective Improvement: 25-30%       Attendance: 10/10  Approved: medicare guidelines           MD follow up: Eda            date: 17     Visit Dx:    ICD-10-CM ICD-9-CM   1. Traumatic complete tear of right rotator cuff, initial encounter S46.011A 840.4   2. S/P rotator cuff repair Z98.890 V45.89   3. Traumatic complete tear of left rotator cuff, initial encounter S46.012A 840.4   4. Biceps tendinitis of right shoulder M75.21 726.12   5. Primary osteoarthritis, right shoulder M19.011 715.11       There is no problem list on file for this patient.       Past Medical History:   Diagnosis Date   • High cholesterol         Past Surgical History:   Procedure Laterality Date   • PACEMAKER IMPLANTATION               PT Ortho       17 1300    Subjective Comments    Subjective Comments Patient reports he feels things have been getting better. He can tell when he is doing his daily stuff at home.   -NH    Precautions and Contraindications    Precautions See Protocol  -NH    Contraindications Pacemaker: No IFC  -NH    Subjective Pain    Post-Treatment Pain Level 0  -NH    Right Shoulder    Flexion PROM Deficit 155 AAROM with wand  -NH      17 1400    Precautions and Contraindications    Precautions See Protocol  -ARA    Contraindications Pacemaker: No IFC  -ARA    Subjective Pain    Post-Treatment Pain Level 0  -ARA    Right Shoulder    Flexion PROM Deficit 152  -ARA    ABduction PROM Deficit 156  -ARA      User Key  (r) = Recorded By, (t) = Taken By, (c) = Cosigned By    Initials Name Provider Type    ARA Reyes, PTA Physical Therapy Assistant    NH Susie Watson, PT Physical Therapist                            PT Assessment/Plan       17 1300       PT Assessment    Functional  Limitations Decreased safety during functional activities;Limitation in home management;Limitations in community activities;Limitations in functional capacity and performance;Performance in leisure activities;Performance in self-care ADL;Performance in work activities  -NH     Impairments Edema;Joint mobility;Muscle strength;Pain;Posture;Range of motion  -NH     Assessment Comments Small amplitude clunk palpable with end range flexion with PROM. Patient progressing steadily with PROM.  -NH     Rehab Potential Good  -NH     Patient/caregiver participated in establishment of treatment plan and goals Yes  -NH     Patient would benefit from skilled therapy intervention Yes  -NH     PT Plan    PT Frequency 2x/week;3x/week  -NH     Predicted Duration of Therapy Intervention (days/wks) 8 weeks  -NH     PT Plan Comments Isometrics next visit. AAROM FF in standing.  -NH       User Key  (r) = Recorded By, (t) = Taken By, (c) = Cosigned By    Initials Name Provider Type    NH Susie Watson, PT Physical Therapist                Modalities       11/22/17 1300          Subjective Pain    Able to rate subjective pain? yes  -NH      Pre-Treatment Pain Level 0  -NH      Ice    Ice Applied Yes  -NH      Location R shoulder  -NH      Rx Minutes 15 mins  -NH      Ice S/P Rx Yes  -NH        User Key  (r) = Recorded By, (t) = Taken By, (c) = Cosigned By    Initials Name Provider Type    NH Susie Watson, PT Physical Therapist                Exercises       11/22/17 1300          Subjective Comments    Subjective Comments Patient reports he feels things have been getting better. He can tell when he is doing his daily stuff at home.   -NH      Subjective Pain    Able to rate subjective pain? yes  -NH      Pre-Treatment Pain Level 0  -NH      Post-Treatment Pain Level 0  -NH      Exercise 1    Exercise Name 1 PRO II AA  -NH      Time (Minutes) 1 8 min  -NH      Exercise 2    Exercise Name 2 Pulleys FF/scap  -NH      Time (Minutes) 2 2  min each  -NH      Exercise 3    Exercise Name 3 Pendulums 4 way  -NH      Time (Minutes) 3 1 min each  -NH      Exercise 4    Exercise Name 4 Scap squeezes  -NH      Sets 4 2  -NH      Reps 4 10  -NH      Exercise 5    Exercise Name 5 Bicep curls  -NH      Sets 5 2  -NH      Reps 5 10  -NH      Additional Comments 2#  -NH      Exercise 6    Exercise Name 6 Wall slides FF  -NH      Sets 6 2  -NH      Reps 6 10  -NH      Additional Comments half with lift off  -NH      Exercise 7    Exercise Name 7 Supine AA wand flexion  -NH      Sets 7 2  -NH      Reps 7 10  -NH      Exercise 8    Exercise Name 8 Supine AA wand abd  -NH      Sets 8 2  -NH      Reps 8 10  -NH        User Key  (r) = Recorded By, (t) = Taken By, (c) = Cosigned By    Initials Name Provider Type    NH Susie Watson, PT Physical Therapist                        Manual Rx (last 36 hours)      Manual Treatments       11/22/17 1300          Manual Rx 1    Manual Rx 1 Location PROM R shoulder  -NH      Manual Rx 1 Type STM/Gentle PROM per protocol  -NH      Manual Rx 1 Duration 10 min  -NH        User Key  (r) = Recorded By, (t) = Taken By, (c) = Cosigned By    Initials Name Provider Type    NH Susie Watson, PT Physical Therapist                PT OP Goals       11/22/17 1300       PT Short Term Goals    STG Date to Achieve 11/21/17  -NH     STG 1 Patient will be IND and compliant with HEP  -NH     STG 1 Progress Progressing  -NH     STG 2 Patient will have PROM flexion to 160 degrees  -NH     STG 2 Progress Progressing  -NH     STG 3 Patient will have PROM abduction to 130 degrees  -NH     STG 3 Progress Met  -NH     STG 4 Patient will have PROM ER to 30 degrees  -NH     STG 4 Progress Met  -NH     STG 5 Patient will reduce Quick Dash to 40  -NH     STG 5 Progress Progressing  -NH     Long Term Goals    LTG Date to Achieve 12/12/17  -NH     LTG 1 Patient will reduce Quick Dash to 20  -NH     LTG 1 Progress Ongoing  -NH     LTG 2 Patient will have R  shld AROM flexion to 155 degrees  -NH     LTG 2 Progress Ongoing  -NH     LTG 3 Patient will have R shld AROM abduction to 150 degrees  -NH     LTG 3 Progress Ongoing  -NH     LTG 4 Patient will have active ER to 50 degrees measured at side  -NH     LTG 4 Progress Ongoing  -NH     LTG 5 Patient will have IR functional reach to L4/L5  -NH     LTG 5 Progress Ongoing  -NH     LTG 6 Patient will be able to tolerate 45 minute treatment session without increased pain on VAS  -NH     LTG 6 Progress Ongoing  -NH     Time Calculation    PT Goal Re-Cert Due Date 11/30/17  -NH       User Key  (r) = Recorded By, (t) = Taken By, (c) = Cosigned By    Initials Name Provider Type    NH Susie Watson, PT Physical Therapist                Therapy Education       11/22/17 1300          Therapy Education    Given HEP;Symptoms/condition management;Pain management;Posture/body mechanics  -NH      Program Reinforced  -NH      How Provided Verbal;Demonstration;Written  -NH      Provided to Patient  -NH      Level of Understanding Verbalized;Demonstrated  -NH        User Key  (r) = Recorded By, (t) = Taken By, (c) = Cosigned By    Initials Name Provider Type    NH Susie Watson, PT Physical Therapist                Time Calculation:   Start Time: 1347  Stop Time: 1445  Time Calculation (min): 58 min  Total Timed Code Minutes- PT: 43 minute(s)    Therapy Charges for Today     Code Description Service Date Service Provider Modifiers Qty    80901583549 HC PT MANUAL THERAPY EA 15 MIN 11/22/2017 Susie Watson, PT GP 1    19806252854 HC PT THER PROC EA 15 MIN 11/22/2017 Susie Watson, PT GP 2    03618262479 HC PT THER SUPP EA 15 MIN 11/22/2017 Susie Watson, PT GP 1                    Susie Watson, PT  11/22/2017

## 2017-11-27 ENCOUNTER — HOSPITAL ENCOUNTER (OUTPATIENT)
Dept: PHYSICAL THERAPY | Facility: HOSPITAL | Age: 67
Setting detail: THERAPIES SERIES
Discharge: HOME | End: 2017-11-27

## 2017-11-27 DIAGNOSIS — S46.011A TRAUMATIC COMPLETE TEAR OF RIGHT ROTATOR CUFF, INITIAL ENCOUNTER: Primary | ICD-10-CM

## 2017-11-27 DIAGNOSIS — Z98.890 S/P ROTATOR CUFF REPAIR: ICD-10-CM

## 2017-11-27 PROCEDURE — 97140 MANUAL THERAPY 1/> REGIONS: CPT | Performed by: PHYSICAL THERAPIST

## 2017-11-27 PROCEDURE — G8984 CARRY CURRENT STATUS: HCPCS | Performed by: PHYSICAL THERAPIST

## 2017-11-27 PROCEDURE — G8985 CARRY GOAL STATUS: HCPCS | Performed by: PHYSICAL THERAPIST

## 2017-11-27 PROCEDURE — 97110 THERAPEUTIC EXERCISES: CPT | Performed by: PHYSICAL THERAPIST

## 2017-11-27 NOTE — THERAPY RE-EVALUATION
Outpatient Physical Therapy Ortho Progress Note  [facility]     Patient Name: Emerson Sofia  : 1950  MRN: [id number]  Today's Date: 2017      Visit Date: 2017     Subjective Improvement: 30%      Attendance:   Approved:   Medicare guidelines        MD follow up:   December       date:   17        There is no problem list on file for this patient.       Past Medical History:   Diagnosis Date   • High cholesterol         Past Surgical History:   Procedure Laterality Date   • PACEMAKER IMPLANTATION         Visit Dx:     ICD-10-CM ICD-9-CM   1. Traumatic complete tear of right rotator cuff, initial encounter S46.011A 840.4   2. S/P rotator cuff repair Z98.890 V45.89                 PT Ortho       17 1400    Subjective Comments    Subjective Comments Pt states overall the shoulder is doing well. Reports his pain has improved and doesn't have as much anymore. Reports there are still some things he can't do but states he's progressing well.  -KG    Precautions and Contraindications    Precautions See Protocol  -KG    Contraindications Pacemaker: No IFC  -KG    Subjective Pain    Post-Treatment Pain Level 0  -KG    Posture/Observations    Posture/Observations Comments No acute distress. No RUE guarding noted.  -KG    Right Shoulder    Flexion AROM Deficit St   -KG    Flexion PROM Deficit 152  -KG    ABduction AROM Deficit St   -KG    ABduction PROM Deficit 164  -KG    External Rotation PROM Deficit 72  -KG    Internal Rotation PROM Deficit 86  -KG    MMT (Manual Muscle Testing)    General MMT Assessment Detail R shoulder MMT deferred due to shoulder restrictions.  -KG      User Key  (r) = Recorded By, (t) = Taken By, (c) = Cosigned By    Initials Name Provider Type    KG Erica Negro, PT Physical Therapist                            Therapy Education       17 1400          Therapy Education    Education Details Added shoulder isometrics  flex/abd/ER/ext  -KG      Given HEP;Symptoms/condition management;Posture/body mechanics  -KG      Program Progressed  -KG      How Provided Verbal;Demonstration;Written  -KG      Provided to Patient  -KG      Level of Understanding Teach back education performed;Verbalized;Demonstrated  -KG        User Key  (r) = Recorded By, (t) = Taken By, (c) = Cosigned By    Initials Name Provider Type    KG Erica Negro, PT Physical Therapist                PT OP Goals       11/27/17 1400       PT Short Term Goals    STG Date to Achieve 12/11/17  -KG     STG 1 Patient will be IND and compliant with HEP  -KG     STG 1 Progress Met;Ongoing  -KG     STG 2 Patient will have PROM flexion to 160 degrees  -KG     STG 2 Progress Progressing  -KG     STG 3 Patient will have PROM abduction to 130 degrees  -KG     STG 3 Progress Met  -KG     STG 4 Patient will have PROM ER to 30 degrees  -KG     STG 4 Progress Met  -KG     STG 5 Patient will reduce Quick Dash to 40  -KG     STG 5 Progress Met  -KG     Long Term Goals    LTG Date to Achieve 12/25/17  -KG     LTG 1 Patient will reduce Quick Dash to 20  -KG     LTG 1 Progress Ongoing  -KG     LTG 2 Patient will have R shld AROM flexion to 155 degrees  -KG     LTG 2 Progress Ongoing  -KG     LTG 3 Patient will have R shld AROM abduction to 150 degrees  -KG     LTG 3 Progress Ongoing  -KG     LTG 4 Patient will have active ER to 50 degrees measured at side  -KG     LTG 4 Progress Ongoing  -KG     LTG 5 Patient will have IR functional reach to L4/L5  -KG     LTG 5 Progress Ongoing  -KG     LTG 6 Patient will be able to tolerate 45 minute treatment session without increased pain on VAS  -KG     LTG 6 Progress Met  -KG     Time Calculation    PT Goal Re-Cert Due Date 12/18/17  -KG       User Key  (r) = Recorded By, (t) = Taken By, (c) = Cosigned By    Initials Name Provider Type    KG Erica Negro, PT Physical Therapist                PT Assessment/Plan       11/27/17 1400       PT  Assessment    Functional Limitations Decreased safety during functional activities;Limitation in home management;Limitations in community activities;Limitations in functional capacity and performance;Performance in leisure activities;Performance in self-care ADL;Performance in work activities  -KG     Impairments Edema;Joint mobility;Muscle strength;Pain;Posture;Range of motion  -KG     Assessment Comments Pt progressing well with PT at this time. Increase in shoulder abd PROM noted this date with no significant change in flex. However, PROM is WFL at this time with tightness noted at endranges. No palpable clunk or popping noted with PROM flex this date. Did well with initial standing AAROM activities with minimal UT compensation noted. Pt will continue to benefit from skilled PT services to further improve shoulder ROM & functional strength.  -KG     Rehab Potential Good  -KG     Patient/caregiver participated in establishment of treatment plan and goals Yes  -KG     Patient would benefit from skilled therapy intervention Yes  -KG     PT Plan    PT Frequency 2x/week  -KG     Predicted Duration of Therapy Intervention (days/wks) 6 weeks  -KG     PT Plan Comments Continue per MD protocol. Continue to progress standing AAROM activities.  -KG       User Key  (r) = Recorded By, (t) = Taken By, (c) = Cosigned By    Initials Name Provider Type    THUY Negro, PT Physical Therapist                Modalities       11/27/17 1400          Ice    Ice Applied Yes  -KG      Location R shoulder  -KG      Rx Minutes 15 mins  -KG      Ice S/P Rx Yes  -KG        User Key  (r) = Recorded By, (t) = Taken By, (c) = Cosigned By    Initials Name Provider Type    THUY Negro, PT Physical Therapist              Exercises       11/27/17 1400          Subjective Comments    Subjective Comments Pt states overall the shoulder is doing well. Reports his pain has improved and doesn't have as much anymore. Reports there are still  "some things he can't do but states he's progressing well.  -KG      Subjective Pain    Able to rate subjective pain? yes  -KG      Pre-Treatment Pain Level 0  -KG      Post-Treatment Pain Level 0  -KG      Aquatics    Aquatics performed? No  -KG      Exercise 1    Exercise Name 1 Pro II for AAROM  -KG      Time (Minutes) 1 10 min  -KG      Additional Comments 5' F/R  -KG      Exercise 2    Exercise Name 2 Pulleys FF/scap  -KG      Time (Minutes) 2 2 min each  -KG      Exercise 3    Exercise Name 3 Wall Slides FF  -KG      Sets 3 2  -KG      Reps 3 10  -KG      Exercise 4    Exercise Name 4 Standing AA Wand Flex  -KG      Sets 4 2  -KG      Reps 4 10  -KG      Exercise 5    Exercise Name 5 Standing AA wand abd  -KG      Sets 5 2  -KG      Reps 5 10  -KG      Exercise 6    Exercise Name 6 Shoulder isometrics flex/ext/abd/ER  -KG      Sets 6 1  -KG      Reps 6 15  -KG      Time (Seconds) 6 5\" hold  -KG      Exercise 7    Exercise Name 7 Bicep curls 3-way  -KG      Sets 7 2  -KG      Reps 7 10  -KG      Additional Comments Each direction  -KG        User Key  (r) = Recorded By, (t) = Taken By, (c) = Cosigned By    Initials Name Provider Type    THUY Negro, PT Physical Therapist           Manual Rx (last 36 hours)      Manual Treatments       11/27/17 1400          Manual Rx 1    Manual Rx 1 Location R shoulder  -KG      Manual Rx 1 Type PROM flex, abd, ER, IR  -KG      Manual Rx 1 Duration 10 min  -KG        User Key  (r) = Recorded By, (t) = Taken By, (c) = Cosigned By    Initials Name Provider Type    THUY Negro, PT Physical Therapist                            Outcome Measures       11/27/17 1400          Quick DASH    Open a tight or new jar. 3  -KG      Do heavy household chores (e.g., wash walls, wash floors) 3  -KG      Carry a shopping bag or briefcase 2  -KG      Wash your back 3  -KG      Use a knife to cut food 1  -KG      Recreational activities in which you take some force or impact " through your arm, should or hand (e.g. golf, hammering, tennis, etc.) 2  -KG      During the past week, to what extent has your arm, shoulder, or hand problem interfered with your normal social activites with family, friends, neighbors or groups? 2  -KG      During the past week, were you limited in your work or other regular daily activities as a result of your arm, shoulder or hand problem? 2  -KG      Arm, Shoulder, or hand pain 2  -KG      Tingling (pins and needles) in your arm, shoulder, or hand 1  -KG      During the past week, how much difficulty have you had sleeping because of the pain in your arm, shoulder or hand? 2  -KG      Number of Questions Answered 11  -KG      Quick DASH Score 27.27  -KG      Functional Assessment    Outcome Measure Options Quick DASH  -KG        User Key  (r) = Recorded By, (t) = Taken By, (c) = Cosigned By    Initials Name Provider Type    KG Erica Negro, PT Physical Therapist            Time Calculation:   Start Time: 1430  Stop Time: 1535  Time Calculation (min): 65 min  Total Timed Code Minutes- PT: 50 minute(s)     Therapy Charges for Today     Code Description Service Date Service Provider Modifiers Qty    63974846309 HC PT CARRY MOV HAND OBJ CURRENT 11/27/2017 Erica Negro, PT GP, CK 1    47058690921 HC PT CARRY MOV HAND OBJ PROJECTED 11/27/2017 Erica Negro, PT GP, CJ 1    39608058580 HC PT THER PROC EA 15 MIN 11/27/2017 Erica Negro, PT GP 2    50209501012 HC PT MANUAL THERAPY EA 15 MIN 11/27/2017 Erica Negro, PT GP 1    89675962773 HC PT THER SUPP EA 15 MIN 11/27/2017 Erica Negro, PT GP 1          PT G-Codes  PT Professional Judgement Used?: Yes  Outcome Measure Options: Quick DASH  Score: 27.27%  Functional Limitation: Carrying, moving and handling objects  Carrying, Moving and Handling Objects Current Status (): At least 40 percent but less than 60 percent impaired, limited or restricted  Carrying, Moving and Handling Objects Goal  Status (): At least 20 percent but less than 40 percent impaired, limited or restricted         Erica Negro, PT  11/27/2017

## 2017-11-29 ENCOUNTER — HOSPITAL ENCOUNTER (OUTPATIENT)
Dept: PHYSICAL THERAPY | Facility: HOSPITAL | Age: 67
Setting detail: THERAPIES SERIES
Discharge: HOME | End: 2017-11-29

## 2017-11-29 DIAGNOSIS — M75.21 BICEPS TENDINITIS OF RIGHT SHOULDER: ICD-10-CM

## 2017-11-29 DIAGNOSIS — S46.012A TRAUMATIC COMPLETE TEAR OF LEFT ROTATOR CUFF, INITIAL ENCOUNTER: ICD-10-CM

## 2017-11-29 DIAGNOSIS — S46.011A TRAUMATIC COMPLETE TEAR OF RIGHT ROTATOR CUFF, INITIAL ENCOUNTER: Primary | ICD-10-CM

## 2017-11-29 DIAGNOSIS — M19.011 PRIMARY OSTEOARTHRITIS, RIGHT SHOULDER: ICD-10-CM

## 2017-11-29 DIAGNOSIS — Z98.890 S/P ROTATOR CUFF REPAIR: ICD-10-CM

## 2017-11-29 PROCEDURE — 97140 MANUAL THERAPY 1/> REGIONS: CPT

## 2017-11-29 PROCEDURE — 97110 THERAPEUTIC EXERCISES: CPT

## 2017-11-29 NOTE — THERAPY TREATMENT NOTE
Outpatient Physical Therapy Ortho Treatment Note  [facility]  Annabelle Bolanos, PTA  17  3:04 PM       Patient Name: Emerson Sofia  : 1950  MRN: [id number]  Today's Date: 2017      Visit Date: 2017  Subjective Improvement:  30%     Attendance:   Approved:         Medicare guidelines  MD follow up:          date:     17    Visit Dx:    ICD-10-CM ICD-9-CM   1. Traumatic complete tear of right rotator cuff, initial encounter S46.011A 840.4   2. S/P rotator cuff repair Z98.890 V45.89   3. Traumatic complete tear of left rotator cuff, initial encounter S46.012A 840.4   4. Biceps tendinitis of right shoulder M75.21 726.12   5. Primary osteoarthritis, right shoulder M19.011 715.11       There is no problem list on file for this patient.       Past Medical History:   Diagnosis Date   • High cholesterol         Past Surgical History:   Procedure Laterality Date   • PACEMAKER IMPLANTATION               PT Ortho       17 1300    Subjective Comments    Subjective Comments Pt states that he slept wrong last night and got on shoulder; woke up with pn this a.m. Has taken pn meds; some better now.  -PM    Precautions and Contraindications    Precautions See Protocol  -PM    Contraindications Pacemaker: No IFC  -PM    Subjective Pain    Able to rate subjective pain? yes  -PM    Pre-Treatment Pain Level 2  -PM    Post-Treatment Pain Level 0  -PM    Posture/Observations    Posture/Observations Comments No acute distress. No RUE guarding noted.  -PM    Right Shoulder    Flexion AROM Deficit Std   -PM    ABduction AROM Deficit AA std 134  -PM    External Rotation AROM Deficit std AA at side 65  -PM    Internal Rotation AROM Deficit sacral base  -PM      17 1400    Subjective Comments    Subjective Comments Pt states overall the shoulder is doing well. Reports his pain has improved and doesn't have as much anymore. Reports there are still some  things he can't do but states he's progressing well.  -KG    Precautions and Contraindications    Precautions See Protocol  -KG    Contraindications Pacemaker: No IFC  -KG    Subjective Pain    Post-Treatment Pain Level 0  -KG    Posture/Observations    Posture/Observations Comments No acute distress. No RUE guarding noted.  -KG    Right Shoulder    Flexion AROM Deficit St   -KG    Flexion PROM Deficit 152  -KG    ABduction AROM Deficit St   -KG    ABduction PROM Deficit 164  -KG    External Rotation PROM Deficit 72  -KG    Internal Rotation PROM Deficit 86  -KG    MMT (Manual Muscle Testing)    General MMT Assessment Detail R shoulder MMT deferred due to shoulder restrictions.  -KG      User Key  (r) = Recorded By, (t) = Taken By, (c) = Cosigned By    Initials Name Provider Type    PM Annabelle Bolanos PTA Physical Therapy Assistant    KG Erica Negro PT Physical Therapist                            PT Assessment/Plan       11/29/17 1300       PT Assessment    Functional Limitations Decreased safety during functional activities;Limitation in home management;Limitations in community activities;Limitations in functional capacity and performance;Performance in leisure activities;Performance in self-care ADL;Performance in work activities  -PM     Impairments Edema;Joint mobility;Muscle strength;Pain;Posture;Range of motion  -PM     Assessment Comments Incr SOFIE; madhu advancements to protocol very well  -PM     Rehab Potential Good  -PM     Patient/caregiver participated in establishment of treatment plan and goals Yes  -PM     Patient would benefit from skilled therapy intervention Yes  -PM     PT Plan    PT Frequency 2x/week  -PM     Predicted Duration of Therapy Intervention (days/wks) 6 wks  -PM     PT Plan Comments cont with phase 2 MD protocol  -PM       User Key  (r) = Recorded By, (t) = Taken By, (c) = Cosigned By    Initials Name Provider Type    PM Annabelle Bolanos PTA Physical Therapy  "Assistant                Modalities       11/29/17 1300          Ice    Location R shoulder  -PM      Rx Minutes 15 mins  -PM      Ice S/P Rx Yes  -PM        User Key  (r) = Recorded By, (t) = Taken By, (c) = Cosigned By    Initials Name Provider Type    PM Annabelle Bolanos PTA Physical Therapy Assistant                Exercises       11/29/17 1300          Subjective Comments    Subjective Comments Pt states that he slept wrong last night and got on shoulder; woke up with pn this a.m. Has taken pn meds; some better now.  -PM      Subjective Pain    Able to rate subjective pain? yes  -PM      Pre-Treatment Pain Level 2  -PM      Post-Treatment Pain Level 0  -PM      Aquatics    Aquatics performed? No  -PM      Exercise 1    Exercise Name 1 Pro II for AAROM  -PM      Time (Minutes) 1 10 min  -PM      Additional Comments L2.5  -PM      Exercise 2    Exercise Name 2 Pulleys FF/scap  -PM      Time (Minutes) 2 2 min each  -PM      Exercise 3    Exercise Name 3 Wall Slides FF  -PM      Sets 3 2  -PM      Reps 3 10  -PM      Exercise 4    Exercise Name 4 Standing AA Wand Flex  -PM      Sets 4 2  -PM      Reps 4 10  -PM      Exercise 5    Exercise Name 5 Standing AA wand abd  -PM      Sets 5 2  -PM      Reps 5 10  -PM      Exercise 6    Exercise Name 6 Shoulder isometrics flex/ext/abd/ER  -PM      Sets 6 1  -PM      Reps 6 15  -PM      Time (Seconds) 6 5\" hold  -PM      Exercise 7    Exercise Name 7 Bicep curls 3-way  -PM      Sets 7 2  -PM      Reps 7 10  -PM      Additional Comments HEP  -PM      Exercise 8    Exercise Name 8 supine CW/CCW @ 90  -PM      Cueing 8 Verbal  -PM      Sets 8 2  -PM      Reps 8 10  -PM      Exercise 9    Exercise Name 9 supine mid rg AROM flexion  -PM      Cueing 9 Verbal  -PM      Sets 9 2  -PM      Reps 9 10  -PM      Exercise 10    Exercise Name 10 SL ER to appr 20 deg  -PM      Cueing 10 Verbal  -PM      Sets 10 2  -PM      Reps 10 10  -PM        User Key  (r) = Recorded By, (t) = Taken " By, (c) = Cosigned By    Initials Name Provider Type    PM Annabelle Bolanos PTA Physical Therapy Assistant                        Manual Rx (last 36 hours)      Manual Treatments       11/29/17 1300          Manual Rx 1    Manual Rx 1 Location R shoulder  -PM      Manual Rx 1 Type PROM flex, abd, ER, IR  -PM      Manual Rx 1 Duration 8  -PM        User Key  (r) = Recorded By, (t) = Taken By, (c) = Cosigned By    Initials Name Provider Type    PM Annabelle Bolanos PTA Physical Therapy Assistant                PT OP Goals       11/29/17 1300       PT Short Term Goals    STG Date to Achieve 12/11/17  -PM     STG 1 Patient will be IND and compliant with HEP  -PM     STG 1 Progress Met;Ongoing  -PM     STG 2 Patient will have PROM flexion to 160 degrees  -PM     STG 2 Progress Progressing  -PM     STG 3 Patient will have PROM abduction to 130 degrees  -PM     STG 3 Progress Met  -PM     STG 4 Patient will have PROM ER to 30 degrees  -PM     STG 4 Progress Met  -PM     STG 5 Patient will reduce Quick Dash to 40  -PM     STG 5 Progress Met  -PM     Long Term Goals    LTG Date to Achieve 12/25/17  -PM     LTG 1 Patient will reduce Quick Dash to 20  -PM     LTG 1 Progress Ongoing  -PM     LTG 2 Patient will have R shld AROM flexion to 155 degrees  -PM     LTG 2 Progress Ongoing  -PM     LTG 3 Patient will have R shld AROM abduction to 150 degrees  -PM     LTG 3 Progress Ongoing  -PM     LTG 4 Patient will have active ER to 50 degrees measured at side  -PM     LTG 4 Progress Ongoing  -PM     LTG 5 Patient will have IR functional reach to L4/L5  -PM     LTG 5 Progress Ongoing  -PM     LTG 6 Patient will be able to tolerate 45 minute treatment session without increased pain on VAS  -PM     LTG 6 Progress Met  -PM     Time Calculation    PT Goal Re-Cert Due Date 12/18/17  -PM       User Key  (r) = Recorded By, (t) = Taken By, (c) = Cosigned By    Initials Name Provider Type    PM Annabelle Bolanos PTA Physical Therapy  Assistant                Therapy Education       11/29/17 1300          Therapy Education    Education Details SL ER AROM low level; supine active flexion gentle  -PM      Given HEP;Symptoms/condition management;Posture/body mechanics  -PM      Program Progressed  -PM      How Provided Verbal;Demonstration;Written  -PM      Provided to Patient  -PM      Level of Understanding Teach back education performed;Verbalized;Demonstrated  -PM        User Key  (r) = Recorded By, (t) = Taken By, (c) = Cosigned By    Initials Name Provider Type    PM Annabelle Bolanos PTA Physical Therapy Assistant                Outcome Measures       11/27/17 1400          Quick DASH    Open a tight or new jar. 3  -KG      Do heavy household chores (e.g., wash walls, wash floors) 3  -KG      Carry a shopping bag or briefcase 2  -KG      Wash your back 3  -KG      Use a knife to cut food 1  -KG      Recreational activities in which you take some force or impact through your arm, should or hand (e.g. golf, hammering, tennis, etc.) 2  -KG      During the past week, to what extent has your arm, shoulder, or hand problem interfered with your normal social activites with family, friends, neighbors or groups? 2  -KG      During the past week, were you limited in your work or other regular daily activities as a result of your arm, shoulder or hand problem? 2  -KG      Arm, Shoulder, or hand pain 2  -KG      Tingling (pins and needles) in your arm, shoulder, or hand 1  -KG      During the past week, how much difficulty have you had sleeping because of the pain in your arm, shoulder or hand? 2  -KG      Number of Questions Answered 11  -KG      Quick DASH Score 27.27  -KG      Functional Assessment    Outcome Measure Options Quick DASH  -KG        User Key  (r) = Recorded By, (t) = Taken By, (c) = Cosigned By    Initials Name Provider Type    KG Erica Negro, PT Physical Therapist            Time Calculation:   Start Time: 1346  Stop Time:  1446  Time Calculation (min): 60 min  Total Timed Code Minutes- PT: 45 minute(s)    Therapy Charges for Today     Code Description Service Date Service Provider Modifiers Qty    98403722600 HC PT THER PROC EA 15 MIN 11/29/2017 Annabelle Bolanos PTA GP 2    24841366672 HC PT MANUAL THERAPY EA 15 MIN 11/29/2017 Annabelle Bolanos PTA GP 1    59316231800 HC PT THER SUPP EA 15 MIN 11/29/2017 Annabelle Bolanos PTA GP 1                    Annabelle Bolanos PTA  11/29/2017

## 2017-12-05 ENCOUNTER — HOSPITAL ENCOUNTER (OUTPATIENT)
Dept: PHYSICAL THERAPY | Facility: HOSPITAL | Age: 67
Setting detail: THERAPIES SERIES
Discharge: HOME | End: 2017-12-05

## 2017-12-05 DIAGNOSIS — M75.21 BICEPS TENDINITIS OF RIGHT SHOULDER: ICD-10-CM

## 2017-12-05 DIAGNOSIS — M19.011 PRIMARY OSTEOARTHRITIS, RIGHT SHOULDER: ICD-10-CM

## 2017-12-05 DIAGNOSIS — S46.011A TRAUMATIC COMPLETE TEAR OF RIGHT ROTATOR CUFF, INITIAL ENCOUNTER: Primary | ICD-10-CM

## 2017-12-05 DIAGNOSIS — S46.012A TRAUMATIC COMPLETE TEAR OF LEFT ROTATOR CUFF, INITIAL ENCOUNTER: ICD-10-CM

## 2017-12-05 DIAGNOSIS — Z98.890 S/P ROTATOR CUFF REPAIR: ICD-10-CM

## 2017-12-05 PROCEDURE — 97110 THERAPEUTIC EXERCISES: CPT

## 2017-12-05 PROCEDURE — 97140 MANUAL THERAPY 1/> REGIONS: CPT

## 2017-12-05 NOTE — THERAPY TREATMENT NOTE
Outpatient Physical Therapy Ortho Treatment Note  [facility]  Julia Reyes, ANALIA  17  5:05 PM       Patient Name: Emerson Sofia  : 1950  MRN: [id number]  Today's Date: 2017      Visit Date: 2017    Visit Dx:    ICD-10-CM ICD-9-CM   1. Traumatic complete tear of right rotator cuff, initial encounter S46.011A 840.4   2. Traumatic complete tear of left rotator cuff, initial encounter S46.012A 840.4   3. S/P rotator cuff repair Z98.890 V45.89   4. Biceps tendinitis of right shoulder M75.21 726.12   5. Primary osteoarthritis, right shoulder M19.011 715.11       There is no problem list on file for this patient.       Past Medical History:   Diagnosis Date   • High cholesterol         Past Surgical History:   Procedure Laterality Date   • PACEMAKER IMPLANTATION               PT Ortho       17 1700    Subjective Comments    Subjective Comments See media tab for 17 treatment  -ARA      User Key  (r) = Recorded By, (t) = Taken By, (c) = Cosigned By    Initials Name Provider Type    ARA Reyes PTA Physical Therapy Assistant                                    Exercises       17 1700          Subjective Comments    Subjective Comments See media tab for 17 treatment  -ARA        User Key  (r) = Recorded By, (t) = Taken By, (c) = Cosigned By    Initials Name Provider Type    ARA Reyes PTA Physical Therapy Assistant                                       Time Calculation:        Therapy Charges for Today     Code Description Service Date Service Provider Modifiers Qty    22359714213 HC PT THER PROC EA 15 MIN 2017 Julia Reyes PTA GP 2    48001976128 HC PT MANUAL THERAPY EA 15 MIN 2017 Julia Reyes, ANALIA GP 1    49798679348 HC PT THER SUPP EA 15 MIN 2017 Julia Reyes PTA GP 1                    Julia Reyes PTA  2017

## 2017-12-07 ENCOUNTER — HOSPITAL ENCOUNTER (OUTPATIENT)
Dept: PHYSICAL THERAPY | Facility: HOSPITAL | Age: 67
Setting detail: THERAPIES SERIES
Discharge: HOME | End: 2017-12-07

## 2017-12-07 DIAGNOSIS — S46.011A TRAUMATIC COMPLETE TEAR OF RIGHT ROTATOR CUFF, INITIAL ENCOUNTER: Primary | ICD-10-CM

## 2017-12-07 DIAGNOSIS — S46.012A TRAUMATIC COMPLETE TEAR OF LEFT ROTATOR CUFF, INITIAL ENCOUNTER: ICD-10-CM

## 2017-12-07 DIAGNOSIS — Z98.890 S/P ROTATOR CUFF REPAIR: ICD-10-CM

## 2017-12-07 PROCEDURE — 97110 THERAPEUTIC EXERCISES: CPT

## 2017-12-07 PROCEDURE — 97140 MANUAL THERAPY 1/> REGIONS: CPT

## 2017-12-07 NOTE — THERAPY TREATMENT NOTE
Outpatient Physical Therapy Ortho Treatment Note  [facility]  Julia Reyes PTA  17  1:49 PM       Patient Name: Emerson Sofia  : 1950  MRN: [id number]  Today's Date: 2017      Visit Date: 2017    Subjective Improvement: 50%       Attendance:     Approved: medicare guidelines           MD follow up: 17           RC date: 17         Visit Dx:    ICD-10-CM ICD-9-CM   1. Traumatic complete tear of right rotator cuff, initial encounter S46.011A 840.4   2. Traumatic complete tear of left rotator cuff, initial encounter S46.012A 840.4   3. S/P rotator cuff repair Z98.890 V45.89       There is no problem list on file for this patient.       Past Medical History:   Diagnosis Date   • High cholesterol         Past Surgical History:   Procedure Laterality Date   • PACEMAKER IMPLANTATION               PT Ortho       17 1300    Subjective Comments    Subjective Comments pt states that this cold weather has made his shoulder kind of stiff  -    Precautions and Contraindications    Precautions See Protocol  -    Contraindications Pacemaker: No IFC  -    Subjective Pain    Able to rate subjective pain? yes  -ARA    Pre-Treatment Pain Level 0  -ARA    Post-Treatment Pain Level 0  -ARA    Right Shoulder    Flexion PROM Deficit 164°  -ARA      17 1700    Subjective Comments    Subjective Comments See media tab for 17 treatment  -      User Key  (r) = Recorded By, (t) = Taken By, (c) = Cosigned By    Initials Name Provider Type     Julia Reyes PTA Physical Therapy Assistant                            PT Assessment/Plan       17 1300       PT Assessment    Functional Limitations Decreased safety during functional activities;Limitation in home management;Limitations in community activities;Limitations in functional capacity and performance;Performance in leisure activities;Performance in self-care ADL;Performance in work activities   -ARA     Impairments Edema;Joint mobility;Muscle strength;Pain;Posture;Range of motion  -ARA     Assessment Comments pt doing well overall- minimal to no pain. Good form throughout.  -ARA     Rehab Potential Good  -ARA     Patient/caregiver participated in establishment of treatment plan and goals Yes  -ARA     Patient would benefit from skilled therapy intervention Yes  -ARA     PT Plan    PT Frequency 2x/week  -ARA     Predicted Duration of Therapy Intervention (days/wks) 6 weeks  -ARA       User Key  (r) = Recorded By, (t) = Taken By, (c) = Cosigned By    Initials Name Provider Type    ARA Reyes PTA Physical Therapy Assistant                Modalities       12/07/17 1300          Ice    Ice Applied Yes  -ARA      Location R shoulder  -ARA      Rx Minutes 15 mins  -ARA      Ice S/P Rx Yes  -ARA        User Key  (r) = Recorded By, (t) = Taken By, (c) = Cosigned By    Initials Name Provider Type    ARA Reyes PTA Physical Therapy Assistant                Exercises       12/07/17 1300          Subjective Comments    Subjective Comments pt states that this cold weather has made his shoulder kind of stiff  -ARA      Subjective Pain    Able to rate subjective pain? yes  -ARA      Pre-Treatment Pain Level 0  -ARA      Post-Treatment Pain Level 0  -ARA      Aquatics    Aquatics performed? No  -ARA      Exercise 1    Exercise Name 1 Pro II for AAROM  -ARA      Time (Minutes) 1 10  -ARA      Additional Comments L2.5  -ARA      Exercise 2    Exercise Name 2 Pulleys FF/scap  -ARA      Time (Minutes) 2 2 min each  -ARA      Exercise 3    Exercise Name 3 Wall slides FF/scap  -ARA      Sets 3 2  -ARA      Reps 3 10  -ARA      Exercise 4    Exercise Name 4 ST wand AA flexion   -ARA      Sets 4 2  -ARA      Reps 4 10  -ARA      Exercise 5    Exercise Name 5 ST AA wand abd  -ARA      Sets 5 2  -ARA      Reps 5 10  -ARA      Exercise 6    Exercise Name 6 Bicep curls 3 way  -ARA      Sets 6 2  -ARA      Reps 6 10  -ARA      Additional  Comments 1# DB  -ARA      Exercise 7    Exercise Name 7 Supine cw/ccw circles  -ARA      Sets 7 2  -ARA      Reps 7 10  -ARA      Exercise 8    Exercise Name 8 Supine mid range AROM flexion  -ARA      Sets 8 2  -ARA      Reps 8 10  -ARA      Exercise 9    Exercise Name 9 SL mid range AROM abd  -ARA      Sets 9 2  -ARA      Reps 9 10  -ARA        User Key  (r) = Recorded By, (t) = Taken By, (c) = Cosigned By    Initials Name Provider Type    ARA Reyes PTA Physical Therapy Assistant                        Manual Rx (last 36 hours)      Manual Treatments       12/07/17 1300          Manual Rx 1    Manual Rx 1 Location R shoulder  -      Manual Rx 1 Type PROM flex, abd, ER, IR  -      Manual Rx 1 Duration 8  -ARA        User Key  (r) = Recorded By, (t) = Taken By, (c) = Cosigned By    Initials Name Provider Type    ARA Reyes PTA Physical Therapy Assistant                PT OP Goals       12/07/17 1300       PT Short Term Goals    STG Date to Achieve 12/11/17  -     STG 1 Patient will be IND and compliant with HEP  -     STG 1 Progress Met;Ongoing  -     STG 2 Patient will have PROM flexion to 160 degrees  -     STG 2 Progress Progressing  -     STG 3 Patient will have PROM abduction to 130 degrees  -     STG 3 Progress Met  -     STG 4 Patient will have PROM ER to 30 degrees  -     STG 4 Progress Met  -     STG 5 Patient will reduce Quick Dash to 40  -[facility] 5 Progress Met  -     Long Term Goals    LTG Date to Achieve 12/25/17  -     LTG 1 Patient will reduce Quick Dash to 20  -     LTG 1 Progress Ongoing  -     LTG 2 Patient will have R shld AROM flexion to 155 degrees  -     LTG 2 Progress Ongoing  -     LTG 3 Patient will have R shld AROM abduction to 150 degrees  -     LTG 3 Progress Ongoing  -     LTG 4 Patient will have active ER to 50 degrees measured at side  -     LTG 4 Progress Ongoing  -     LTG 5 Patient will have IR functional reach to L4/L5  -      LTG 5 Progress Ongoing  -ARA     LTG 6 Patient will be able to tolerate 45 minute treatment session without increased pain on VAS  -ARA     LTG 6 Progress Met  -ARA     Time Calculation    PT Goal Re-Cert Due Date 12/18/17  -ARA       User Key  (r) = Recorded By, (t) = Taken By, (c) = Cosigned By    Initials Name Provider Type    ARA Reyes PTA Physical Therapy Assistant                Therapy Education       12/07/17 1300          Therapy Education    Given HEP;Symptoms/condition management;Posture/body mechanics  -ARA      Program Progressed  -ARA      How Provided Verbal;Demonstration;Written  -ARA      Provided to Patient  -ARA      Level of Understanding Teach back education performed;Verbalized;Demonstrated  -ARA        User Key  (r) = Recorded By, (t) = Taken By, (c) = Cosigned By    Initials Name Provider Type    ARA Reyes PTA Physical Therapy Assistant                Time Calculation:   Start Time: 1300  Stop Time: 1400  Time Calculation (min): 60 min  Total Timed Code Minutes- PT: 45 minute(s)    Therapy Charges for Today     Code Description Service Date Service Provider Modifiers Qty    51376649851 HC PT THER PROC EA 15 MIN 12/7/2017 Julia Reyes PTA GP 2    59489033890 HC PT MANUAL THERAPY EA 15 MIN 12/7/2017 Julia Reyes PTA GP 1    27438135529 HC PT THER SUPP EA 15 MIN 12/7/2017 Julia Reyes PTA GP 1                    Julia Reyes PTA  12/7/2017

## 2017-12-12 ENCOUNTER — HOSPITAL ENCOUNTER (OUTPATIENT)
Dept: PHYSICAL THERAPY | Facility: HOSPITAL | Age: 67
Setting detail: THERAPIES SERIES
Discharge: HOME | End: 2017-12-12

## 2017-12-12 DIAGNOSIS — Z98.890 S/P ROTATOR CUFF REPAIR: ICD-10-CM

## 2017-12-12 DIAGNOSIS — S46.011A TRAUMATIC COMPLETE TEAR OF RIGHT ROTATOR CUFF, INITIAL ENCOUNTER: Primary | ICD-10-CM

## 2017-12-12 DIAGNOSIS — S46.012A TRAUMATIC COMPLETE TEAR OF LEFT ROTATOR CUFF, INITIAL ENCOUNTER: ICD-10-CM

## 2017-12-12 DIAGNOSIS — M75.21 BICEPS TENDINITIS OF RIGHT SHOULDER: ICD-10-CM

## 2017-12-12 DIAGNOSIS — M19.011 PRIMARY OSTEOARTHRITIS, RIGHT SHOULDER: ICD-10-CM

## 2017-12-12 PROCEDURE — 97140 MANUAL THERAPY 1/> REGIONS: CPT

## 2017-12-12 PROCEDURE — 97110 THERAPEUTIC EXERCISES: CPT

## 2017-12-12 NOTE — THERAPY TREATMENT NOTE
Outpatient Physical Therapy Ortho Treatment Note  [facility]     Patient Name: Emerson Sofia  : 1950  MRN: [id number]  Today's Date: 2017      Visit Date: 2017     Subjective Improvement: 50%       Attendance:     Approved: medicare guidelines           MD follow up: 17            date: 17    Visit Dx:    ICD-10-CM ICD-9-CM   1. Traumatic complete tear of right rotator cuff, initial encounter S46.011A 840.4   2. Traumatic complete tear of left rotator cuff, initial encounter S46.012A 840.4   3. S/P rotator cuff repair Z98.890 V45.89   4. Biceps tendinitis of right shoulder M75.21 726.12   5. Primary osteoarthritis, right shoulder M19.011 715.11       There is no problem list on file for this patient.       Past Medical History:   Diagnosis Date   • High cholesterol         Past Surgical History:   Procedure Laterality Date   • PACEMAKER IMPLANTATION               PT Ortho       17 1300    Subjective Comments    Subjective Comments Pt reports no pain this afternoon. No new complaints.   -MB    Precautions and Contraindications    Precautions See Protocol  -MB    Contraindications Pacemaker: No IFC  -MB    Subjective Pain    Able to rate subjective pain? yes  -MB    Pre-Treatment Pain Level 0  -MB    Post-Treatment Pain Level 0  -MB    Right Shoulder    Flexion AROM Deficit 138° Standing  -MB    ABduction AROM Deficit 141° standing  -MB      User Key  (r) = Recorded By, (t) = Taken By, (c) = Cosigned By    Initials Name Provider Type    JULIANNA Colin PTA Physical Therapy Assistant                            PT Assessment/Plan       17 1400       PT Assessment    Functional Limitations Decreased safety during functional activities;Limitation in home management;Limitations in community activities;Limitations in functional capacity and performance;Performance in leisure activities;Performance in self-care ADL;Performance in work activities   -MB     Impairments Edema;Joint mobility;Muscle strength;Pain;Posture;Range of motion  -MB     Assessment Comments Pt has good initial AROM standing FF/abd this date. Pt R shoulder does fatigue quickly with PRE. PROM WFL at this time.l  -MB     Rehab Potential Good  -MB     Patient/caregiver participated in establishment of treatment plan and goals Yes  -MB     Patient would benefit from skilled therapy intervention Yes  -MB     PT Plan    PT Frequency 2x/week  -MB     Predicted Duration of Therapy Intervention (days/wks) 6 weeks  -MB     PT Plan Comments Continue per MD protocol.  -MB       User Key  (r) = Recorded By, (t) = Taken By, (c) = Cosigned By    Initials Name Provider Type    JULIANNA Colin PTA Physical Therapy Assistant                Modalities       12/12/17 1300          Ice    Ice Applied Yes  -MB      Location R shoulder  -MB      Rx Minutes 15 mins  -MB      Ice S/P Rx Yes  -MB        User Key  (r) = Recorded By, (t) = Taken By, (c) = Cosigned By    Initials Name Provider Type    JULIANNA Colin PTA Physical Therapy Assistant                Exercises       12/12/17 1300          Subjective Comments    Subjective Comments Pt reports no pain this afternoon. No new complaints.   -MB      Subjective Pain    Able to rate subjective pain? yes  -MB      Pre-Treatment Pain Level 0  -MB      Post-Treatment Pain Level 0  -MB      Aquatics    Aquatics performed? No  -MB      Exercise 1    Exercise Name 1 Pro II for AAROM  -MB      Time (Minutes) 1 10  -MB      Additional Comments 3.0  -MB      Exercise 2    Exercise Name 2 Pulleys FF/scap  -MB      Time (Minutes) 2 2 min each  -MB      Exercise 3    Exercise Name 3 Wall slides FF/scap  -MB      Sets 3 2  -MB      Reps 3 10  -MB      Exercise 4    Exercise Name 4 Tband Rows  -MB      Sets 4 2  -MB      Reps 4 10  -MB      Additional Comments Red  -MB      Exercise 5    Exercise Name 5 Tband Shoulder Ext  -MB      Sets 5 2  -MB      Reps 5 10  -MB       Exercise 6    Exercise Name 6 PRE: FF/Scap/abd to 90°  -MB      Sets 6 1  -MB      Reps 6 10  -MB      Exercise 7    Exercise Name 7 Wall pushups  -MB      Sets 7 2  -MB      Reps 7 10  -MB      Exercise 8    Exercise Name 8 Bicep curls/hammer curls  -MB      Sets 8 1  -MB      Reps 8 5  -MB      Additional Comments 3# DB  -MB      Exercise 9    Exercise Name 9 Serratus Punch  -MB      Sets 9 2  -MB      Reps 9 10  -MB      Additional Comments 3# bar  -MB      Exercise 10    Exercise Name 10 Supine [place] at 90° CW/CCW  -MB      Sets 10 2  -MB      Reps 10 10  -MB        User Key  (r) = Recorded By, (t) = Taken By, (c) = Cosigned By    Initials Name Provider Type    JULIANNA Colin PTA Physical Therapy Assistant                        Manual Rx (last 36 hours)      Manual Treatments       12/12/17 1300          Manual Rx 1    Manual Rx 1 Location R shoulder  -MB      Manual Rx 1 Type PROM flex, abd, ER, IR  -MB      Manual Rx 1 Duration 8  -MB        User Key  (r) = Recorded By, (t) = Taken By, (c) = Cosigned By    Initials Name Provider Type    JULIANNA Colin PTA Physical Therapy Assistant                PT OP Goals       12/12/17 1400       PT Short Term Goals    STG Date to Achieve 12/11/17  -MB     STG 1 Patient will be IND and compliant with HEP  -MB     STG 1 Progress Met;Ongoing  -MB     STG 2 Patient will have PROM flexion to 160 degrees  -MB     STG 2 Progress Progressing  -MB     STG 3 Patient will have PROM abduction to 130 degrees  -MB     STG 3 Progress Met  -MB     STG 4 Patient will have PROM ER to 30 degrees  -MB     STG 4 Progress Met  -MB     STG 5 Patient will reduce Quick Dash to 40  -MB     STG 5 Progress Met  -MB     Long Term Goals    LTG Date to Achieve 12/25/17  -MB     LTG 1 Patient will reduce Quick Dash to 20  -MB     LTG 1 Progress Ongoing  -MB     LTG 2 Patient will have R shld AROM flexion to 155 degrees  -MB     LTG 2 Progress Ongoing  -MB     LTG 3 Patient will have R  shld AROM abduction to 150 degrees  -MB     LTG 3 Progress Ongoing  -MB     LTG 4 Patient will have active ER to 50 degrees measured at side  -MB     LTG 4 Progress Ongoing  -MB     LTG 5 Patient will have IR functional reach to L4/L5  -MB     LTG 5 Progress Ongoing  -MB     LTG 6 Patient will be able to tolerate 45 minute treatment session without increased pain on VAS  -MB     LTG 6 Progress Met  -MB     Time Calculation    PT Goal Re-Cert Due Date 12/18/17  -MB       User Key  (r) = Recorded By, (t) = Taken By, (c) = Cosigned By    Initials Name Provider Type    JULIANNA Colin PTA Physical Therapy Assistant          Therapy Education  Given: HEP, Symptoms/condition management, Posture/body mechanics  Program: Progressed (Tband rows/ext)  How Provided: Verbal, Demonstration, Written  Provided to: Patient  Level of Understanding: Teach back education performed, Verbalized, Demonstrated              Time Calculation:   Start Time: 1345  Stop Time: 1446  Time Calculation (min): 61 min  Total Timed Code Minutes- PT: 46 minute(s)    Therapy Charges for Today     Code Description Service Date Service Provider Modifiers Qty    76979090888 HC PT THER PROC EA 15 MIN 12/12/2017 Filemon Colin PTA GP 2    64674534796 HC PT MANUAL THERAPY EA 15 MIN 12/12/2017 Filemon Colin PTA GP 1    39421556959 HC PT THER SUPP EA 15 MIN 12/12/2017 Filemon Colin PTA GP 1                    Filemon Colin PTA  12/12/2017

## 2017-12-15 ENCOUNTER — HOSPITAL ENCOUNTER (OUTPATIENT)
Dept: PHYSICAL THERAPY | Facility: HOSPITAL | Age: 67
Setting detail: THERAPIES SERIES
Discharge: HOME | End: 2017-12-15

## 2017-12-15 DIAGNOSIS — Z98.890 S/P ROTATOR CUFF REPAIR: ICD-10-CM

## 2017-12-15 DIAGNOSIS — S46.012A TRAUMATIC COMPLETE TEAR OF LEFT ROTATOR CUFF, INITIAL ENCOUNTER: ICD-10-CM

## 2017-12-15 DIAGNOSIS — S46.011A TRAUMATIC COMPLETE TEAR OF RIGHT ROTATOR CUFF, INITIAL ENCOUNTER: Primary | ICD-10-CM

## 2017-12-15 PROCEDURE — 97110 THERAPEUTIC EXERCISES: CPT

## 2017-12-15 NOTE — THERAPY TREATMENT NOTE
Outpatient Physical Therapy Ortho Treatment Note  [facility]     Patient Name: Emerson Sofia  : 1950  MRN: [id number]  Today's Date: 12/15/2017      Visit Date: 12/15/2017     Subjective Improvement: 50%       Attendance: 15/15    Approved: medicare guidelines           MD follow up: 17            date: 17    Visit Dx:    ICD-10-CM ICD-9-CM   1. Traumatic complete tear of right rotator cuff, initial encounter S46.011A 840.4   2. Traumatic complete tear of left rotator cuff, initial encounter S46.012A 840.4   3. S/P rotator cuff repair Z98.890 V45.89       There is no problem list on file for this patient.       Past Medical History:   Diagnosis Date   • High cholesterol         Past Surgical History:   Procedure Laterality Date   • PACEMAKER IMPLANTATION               PT Ortho       12/15/17 1100    Subjective Comments    Subjective Comments Pt states that he is feeling good this morning.   -MB    Precautions and Contraindications    Precautions See Protocol  -MB    Contraindications Pacemaker: No IFC  -MB    Subjective Pain    Able to rate subjective pain? yes  -MB    Pre-Treatment Pain Level 0  -MB    Post-Treatment Pain Level 0  -MB    Right Shoulder    Flexion AROM Deficit 144°  -MB    ABduction AROM Deficit 145°  -MB      17 1300    Subjective Comments    Subjective Comments Pt reports no pain this afternoon. No new complaints.   -MB    Precautions and Contraindications    Precautions See Protocol  -MB    Contraindications Pacemaker: No IFC  -MB    Subjective Pain    Able to rate subjective pain? yes  -MB    Pre-Treatment Pain Level 0  -MB    Post-Treatment Pain Level 0  -MB    Right Shoulder    Flexion AROM Deficit 138° Standing  -MB    ABduction AROM Deficit 141° standing  -MB      User Key  (r) = Recorded By, (t) = Taken By, (c) = Cosigned By    Initials Name Provider Type    JULIANNA Colin, PTA Physical Therapy Assistant                            PT  Assessment/Plan       12/15/17 1100       PT Assessment    Functional Limitations Decreased safety during functional activities;Limitation in home management;Limitations in community activities;Limitations in functional capacity and performance;Performance in leisure activities;Performance in self-care ADL;Performance in work activities  -MB     Impairments Edema;Joint mobility;Muscle strength;Pain;Posture;Range of motion  -MB     Assessment Comments Increase in standing AROM FF/Abd displayed today. Better performance with PRE today, decrease in fatigue and patient did not require a rest break. Progressing well at this time.  -MB     Rehab Potential Good  -MB     Patient/caregiver participated in establishment of treatment plan and goals Yes  -MB     Patient would benefit from skilled therapy intervention Yes  -MB     PT Plan    PT Frequency 2x/week  -MB     Predicted Duration of Therapy Intervention (days/wks) 6 weeks  -MB     PT Plan Comments Continue per MD protocol.  -MB       User Key  (r) = Recorded By, (t) = Taken By, (c) = Cosigned By    Initials Name Provider Type    JULIANNA Colin PTA Physical Therapy Assistant                Modalities       12/15/17 1100          Ice    Ice Applied Yes  -MB      Location R shoulder  -MB      Rx Minutes 15 mins  -MB      Ice S/P Rx Yes  -MB        User Key  (r) = Recorded By, (t) = Taken By, (c) = Cosigned By    Initials Name Provider Type    JULIANNA Colin PTA Physical Therapy Assistant                Exercises       12/15/17 1100          Subjective Comments    Subjective Comments Pt states that he is feeling good this morning.   -MB      Subjective Pain    Able to rate subjective pain? yes  -MB      Pre-Treatment Pain Level 0  -MB      Post-Treatment Pain Level 0  -MB      Aquatics    Aquatics performed? No  -MB      Exercise 1    Exercise Name 1 Pro II for AAROM  -MB      Time (Minutes) 1 10  -MB      Exercise 2    Exercise Name 2 Pulleys FF/scap  -MB       Time (Minutes) 2 2 min each  -MB      Exercise 3    Exercise Name 3 Wall wipe ABCs  -MB      Sets 3 1  -MB      Reps 3 1  -MB      Exercise 4    Exercise Name 4 Tband Rows  -MB      Sets 4 2  -MB      Reps 4 10  -MB      Additional Comments Green  -MB      Exercise 5    Exercise Name 5 Tband Shoulder Ext  -MB      Sets 5 2  -MB      Reps 5 10  -MB      Additional Comments Green  -MB      Exercise 6    Exercise Name 6 PRE: FF/Scap/abd to 90°  -MB      Sets 6 1  -MB      Reps 6 10  -MB      Exercise 7    Exercise Name 7 Wall pushups  -MB      Sets 7 2  -MB      Reps 7 10  -MB      Exercise 8    Exercise Name 8 Bicep curls/hammer curls  -MB      Sets 8 1  -MB      Reps 8 5  -MB      Additional Comments 3# DB  -MB      Exercise 9    Exercise Name 9 Serratus Punch  -MB      Sets 9 2  -MB      Reps 9 10  -MB      Additional Comments 3# bar  -MB      Exercise 10    Exercise Name 10 Supine [place] at 90° CW/CCW  -MB      Sets 10 2  -MB      Reps 10 10  -MB        User Key  (r) = Recorded By, (t) = Taken By, (c) = Cosigned By    Initials Name Provider Type    JULIANNA Colin PTA Physical Therapy Assistant                        Manual Rx (last 36 hours)      Manual Treatments       12/15/17 1100          Manual Rx 1    Manual Rx 1 Location R shoulder  -MB      Manual Rx 1 Type PROM flex, abd, ER, IR  -MB      Manual Rx 1 Duration 6  -MB        User Key  (r) = Recorded By, (t) = Taken By, (c) = Cosigned By    Initials Name Provider Type    JULIANNA Colin PTA Physical Therapy Assistant                PT OP Goals       12/15/17 1100       PT Short Term Goals    STG Date to Achieve 12/11/17  -MB     STG 1 Patient will be IND and compliant with HEP  -MB     STG 1 Progress Met;Ongoing  -MB     STG 2 Patient will have PROM flexion to 160 degrees  -MB     STG 2 Progress Progressing  -MB     STG 3 Patient will have PROM abduction to 130 degrees  -MB     STG 3 Progress Met  -MB     STG 4 Patient will have PROM ER to 30  degrees  -MB     STG 4 Progress Met  -MB     STG 5 Patient will reduce Quick Dash to 40  -MB     STG 5 Progress Met  -MB     Long Term Goals    LTG Date to Achieve 12/25/17  -MB     LTG 1 Patient will reduce Quick Dash to 20  -MB     LTG 1 Progress Ongoing  -MB     LTG 2 Patient will have R shld AROM flexion to 155 degrees  -MB     LTG 2 Progress Ongoing  -MB     LTG 3 Patient will have R shld AROM abduction to 150 degrees  -MB     LTG 3 Progress Ongoing  -MB     LTG 4 Patient will have active ER to 50 degrees measured at side  -MB     LTG 4 Progress Ongoing  -MB     LTG 5 Patient will have IR functional reach to L4/L5  -MB     LTG 5 Progress Ongoing  -MB     LTG 6 Patient will be able to tolerate 45 minute treatment session without increased pain on VAS  -MB     LTG 6 Progress Met  -MB     Time Calculation    PT Goal Re-Cert Due Date 12/18/17  -MB       User Key  (r) = Recorded By, (t) = Taken By, (c) = Cosigned By    Initials Name Provider Type    JULIANNA Colin PTA Physical Therapy Assistant          Therapy Education  Given: HEP, Symptoms/condition management, Posture/body mechanics  Program: Progressed (Tband rows/ext)  How Provided: Verbal, Demonstration, Written  Provided to: Patient  Level of Understanding: Teach back education performed, Verbalized, Demonstrated              Time Calculation:   Start Time: 1101  Stop Time: 1200  Time Calculation (min): 59 min  Total Timed Code Minutes- PT: 44 minute(s)    Therapy Charges for Today     Code Description Service Date Service Provider Modifiers Qty    72104640451 HC PT THER PROC EA 15 MIN 12/15/2017 Filemon Colin PTA GP 3    85826782754 HC PT THER SUPP EA 15 MIN 12/15/2017 Filemon Colin PTA GP 1                    Filemon Colin PTA  12/15/2017

## 2017-12-20 ENCOUNTER — HOSPITAL ENCOUNTER (OUTPATIENT)
Dept: PHYSICAL THERAPY | Facility: HOSPITAL | Age: 67
Setting detail: THERAPIES SERIES
Discharge: HOME | End: 2017-12-20

## 2017-12-20 DIAGNOSIS — S46.011A TRAUMATIC COMPLETE TEAR OF RIGHT ROTATOR CUFF, INITIAL ENCOUNTER: Primary | ICD-10-CM

## 2017-12-20 DIAGNOSIS — M75.21 BICEPS TENDINITIS OF RIGHT SHOULDER: ICD-10-CM

## 2017-12-20 DIAGNOSIS — M19.011 PRIMARY OSTEOARTHRITIS, RIGHT SHOULDER: ICD-10-CM

## 2017-12-20 DIAGNOSIS — Z98.890 S/P ROTATOR CUFF REPAIR: ICD-10-CM

## 2017-12-20 PROCEDURE — 97140 MANUAL THERAPY 1/> REGIONS: CPT

## 2017-12-20 PROCEDURE — 97110 THERAPEUTIC EXERCISES: CPT

## 2017-12-20 NOTE — THERAPY TREATMENT NOTE
Outpatient Physical Therapy Ortho Treatment Note  [facility]     Patient Name: Emerson Sofia  : 1950  MRN: [id number]  Today's Date: 2017      Subjective Improvement: 50%       Attendance:     Approved: medicare guidelines MD follow up: 17            date: 17    Visit Date: 2017    Visit Dx:    ICD-10-CM ICD-9-CM   1. Traumatic complete tear of right rotator cuff, initial encounter S46.011A 840.4   2. S/P rotator cuff repair Z98.890 V45.89   3. Biceps tendinitis of right shoulder M75.21 726.12   4. Primary osteoarthritis, right shoulder M19.011 715.11       There is no problem list on file for this patient.       Past Medical History:   Diagnosis Date   • High cholesterol         Past Surgical History:   Procedure Laterality Date   • PACEMAKER IMPLANTATION                               PT Assessment/Plan       17 1500       PT Assessment    Functional Limitations Decreased safety during functional activities;Limitation in home management;Limitations in community activities;Limitations in functional capacity and performance;Performance in leisure activities;Performance in self-care ADL;Performance in work activities  -NH     Impairments Edema;Joint mobility;Muscle strength;Pain;Posture;Range of motion  -NH     Assessment Comments Patient advancing steadily with PREs as evidenced by increase in exercises today. Patient able to perform AROM nearly equal to unaffected. Patient fatigued quickly with ER and ABD motions with exercise.  -NH     Rehab Potential Good  -NH     Patient/caregiver participated in establishment of treatment plan and goals Yes  -NH     Patient would benefit from skilled therapy intervention Yes  -NH     PT Plan    PT Frequency 2x/week  -NH     Predicted Duration of Therapy Intervention (days/wks) 6 weeks  -NH     PT Plan Comments Recheck Due  -NH       User Key  (r) = Recorded By, (t) = Taken By, (c) = Cosigned By     Initials Name Provider Type    NH Susie Watson, PT Physical Therapist                Modalities       12/20/17 1500          Ice    Patient denies application of Ice Yes  -NH        User Key  (r) = Recorded By, (t) = Taken By, (c) = Cosigned By    Initials Name Provider Type    NH Susie Watson, PT Physical Therapist                Exercises       12/20/17 1400          Subjective Comments    Subjective Comments Patient reports his shoulder is really good unless he lifts things the wrong way or something too heavy.  -NH      Subjective Pain    Able to rate subjective pain? yes  -NH      Pre-Treatment Pain Level 0  -NH      Post-Treatment Pain Level 0  -NH      Aquatics    Aquatics performed? No  -NH      Exercise 1    Exercise Name 1 Pro II for AAROM  -NH      Time (Minutes) 1 10  -NH      Additional Comments 4.0  -NH      Exercise 2    Exercise Name 2 Pulleys FF/scap  -NH      Time (Minutes) 2 Held d/t good AROM  -NH      Additional Comments Instructed patient to cont at home  -NH      Exercise 3    Exercise Name 3 Wall wipe ABCs  -NH      Sets 3 1  -NH      Reps 3 1  -NH      Exercise 4    Exercise Name 4 Tband Rows  -NH      Sets 4 2  -NH      Reps 4 10  -NH      Additional Comments Blue  -NH      Exercise 5    Exercise Name 5 Tband Shoulder Ext  -NH      Sets 5 2  -NH      Reps 5 10  -NH      Additional Comments Blue  -NH      Exercise 6    Exercise Name 6 PRE: FF/Scap/abd to 90°  -NH      Sets 6 2  -NH      Reps 6 10  -NH      Exercise 7    Exercise Name 7 Wall pushups  -NH      Sets 7 2  -NH      Reps 7 10  -NH      Exercise 8    Exercise Name 8 Bicep curls/hammer curls  -NH      Sets 8 1  -NH      Reps 8 5  -NH      Additional Comments 3# DB  -NH      Exercise 9    Exercise Name 9 Serratus Punch  -NH      Sets 9 2  -NH      Reps 9 10  -NH      Additional Comments 5# bar  -NH      Exercise 10    Exercise Name 10 Supine [place] at 90° CW/CCW  -NH      Sets 10 2  -NH      Reps 10 10  -NH      Additional  Comments 2# DB  -NH      Exercise 11    Exercise Name 11 TB IR/ER   -NH      Sets 11 2  -NH      Reps 11 10  -NH      Additional Comments Green IR/Red ER to neutral  -NH        User Key  (r) = Recorded By, (t) = Taken By, (c) = Cosigned By    Initials Name Provider Type    NH Susie Watson, PT Physical Therapist                        Manual Rx (last 36 hours)      Manual Treatments       12/20/17 1500          Manual Rx 1    Manual Rx 1 Location R shoulder  -NH      Manual Rx 1 Type PROM flex, abd, ER, IR  -NH      Manual Rx 1 Duration 5  -NH      Manual Rx 2    Manual Rx 2 Location R shld  -NH      Manual Rx 2 Type resisted PNF D2 flex/ext, rhythmic stab supine 90 deg flexion  -NH      Manual Rx 2 Duration 3  -NH        User Key  (r) = Recorded By, (t) = Taken By, (c) = Cosigned By    Initials Name Provider Type    NH Susie Watson, PT Physical Therapist                PT OP Goals       12/20/17 1500       PT Short Term Goals    STG Date to Achieve 12/11/17  -NH     STG 1 Patient will be IND and compliant with HEP  -NH     STG 1 Progress Met;Ongoing  -NH     STG 2 Patient will have PROM flexion to 160 degrees  -NH     STG 2 Progress Progressing  -NH     STG 3 Patient will have PROM abduction to 130 degrees  -NH     STG 3 Progress Met  -NH     STG 4 Patient will have PROM ER to 30 degrees  -NH     STG 4 Progress Met  -NH     STG 5 Patient will reduce Quick Dash to 40  -NH     STG 5 Progress Met  -NH     Long Term Goals    LTG Date to Achieve 12/25/17  -NH     LTG 1 Patient will reduce Quick Dash to 20  -NH     LTG 1 Progress Ongoing  -NH     LTG 2 Patient will have R shld AROM flexion to 155 degrees  -NH     LTG 2 Progress Ongoing  -NH     LTG 3 Patient will have R shld AROM abduction to 150 degrees  -NH     LTG 3 Progress Ongoing  -NH     LTG 4 Patient will have active ER to 50 degrees measured at side  -NH     LTG 4 Progress Ongoing  -NH     LTG 5 Patient will have IR functional reach to L4/L5  -NH     LTG 5  Progress Met  -NH     LTG 6 Patient will be able to tolerate 45 minute treatment session without increased pain on VAS  -NH     LTG 6 Progress Met  -NH     Time Calculation    PT Goal Re-Cert Due Date 12/18/17  -NH       User Key  (r) = Recorded By, (t) = Taken By, (c) = Cosigned By    Initials Name Provider Type    NH Susie Watson, PT Physical Therapist          Therapy Education  Education Details: TB ir/er for HEP  Given: HEP, Symptoms/condition management, Posture/body mechanics  Program: Progressed  How Provided: Verbal, Demonstration, Written  Provided to: Patient  Level of Understanding: Teach back education performed, Verbalized, Demonstrated              Time Calculation:   Start Time: 1350  Stop Time: 1432  Time Calculation (min): 42 min  Total Timed Code Minutes- PT: 42 minute(s)    Therapy Charges for Today     Code Description Service Date Service Provider Modifiers Qty    93863500237 HC PT MANUAL THERAPY EA 15 MIN 12/20/2017 Susie Watson, PT GP, KX 1    13496419988 HC PT THER PROC EA 15 MIN 12/20/2017 Susie Watson, PT GP, KX 2                    Susie Watson, PT  12/20/2017

## 2017-12-22 ENCOUNTER — HOSPITAL ENCOUNTER (OUTPATIENT)
Dept: PHYSICAL THERAPY | Facility: HOSPITAL | Age: 67
Setting detail: THERAPIES SERIES
Discharge: HOME | End: 2017-12-22

## 2017-12-22 DIAGNOSIS — M75.21 BICEPS TENDINITIS OF RIGHT SHOULDER: ICD-10-CM

## 2017-12-22 DIAGNOSIS — M19.011 PRIMARY OSTEOARTHRITIS, RIGHT SHOULDER: ICD-10-CM

## 2017-12-22 DIAGNOSIS — S46.011A TRAUMATIC COMPLETE TEAR OF RIGHT ROTATOR CUFF, INITIAL ENCOUNTER: Primary | ICD-10-CM

## 2017-12-22 DIAGNOSIS — Z98.890 S/P ROTATOR CUFF REPAIR: ICD-10-CM

## 2017-12-22 PROCEDURE — G8984 CARRY CURRENT STATUS: HCPCS

## 2017-12-22 PROCEDURE — 97110 THERAPEUTIC EXERCISES: CPT

## 2017-12-22 PROCEDURE — 97140 MANUAL THERAPY 1/> REGIONS: CPT

## 2017-12-22 PROCEDURE — G8985 CARRY GOAL STATUS: HCPCS

## 2017-12-22 NOTE — THERAPY PROGRESS REPORT/RE-CERT
Outpatient Physical Therapy Ortho Progress Note  [facility], [place]     Patient Name: Emerson Sofia  : 1950  MRN: [id number]  Today's Date: 2017      Subjective Improvement: 75%       Attendance:     Approved: medicare guidelines MD follow up: 17            date: 18    Visit Date: 2017    There is no problem list on file for this patient.       Past Medical History:   Diagnosis Date   • High cholesterol         Past Surgical History:   Procedure Laterality Date   • PACEMAKER IMPLANTATION         Visit Dx:     ICD-10-CM ICD-9-CM   1. Traumatic complete tear of right rotator cuff, initial encounter S46.011A 840.4   2. S/P rotator cuff repair Z98.890 V45.89   3. Biceps tendinitis of right shoulder M75.21 726.12   4. Primary osteoarthritis, right shoulder M19.011 715.11                 PT Ortho       17 1000    Subjective Comments    Subjective Comments Patient reports he slept on his R side and feels more sore today.  -NH    Precautions and Contraindications    Precautions See Protocol  -NH    Contraindications Pacemaker: No IFC  -NH    Subjective Pain    Able to rate subjective pain? yes  -NH    Pre-Treatment Pain Level 3  -NH    Right Shoulder    Flexion AROM Deficit 154  -NH    Flexion PROM Deficit 165  -NH    ABduction AROM Deficit 145  -NH    ABduction PROM Deficit 164  -NH    External Rotation AROM Deficit 70 arm at side; T3 functional reach  -NH    External Rotation PROM Deficit 80 slight abd  -NH    Internal Rotation AROM Deficit L4 functional reach  -NH    Internal Rotation PROM Deficit 85 slight abd  -NH    MMT (Manual Muscle Testing)    General MMT Assessment upper extremity strength deficits identified  -NH    Right Shoulder    Flexion Gross Movement (3+/5) fair plus  -NH    ABduction Gross Movement (3+/5) fair plus  -NH    Int Rotation Gross Movement (4/5) good  -NH    Ext Rotation Gross Movement (4-/5) good minus  -NH      User Key  (r) =  Recorded By, (t) = Taken By, (c) = Cosigned By    Initials Name Provider Type    NH Susie Watson, PT Physical Therapist                      Therapy Education  Given: HEP, Symptoms/condition management, Posture/body mechanics  Program: Progressed  How Provided: Verbal, Demonstration, Written  Provided to: Patient  Level of Understanding: Teach back education performed, Verbalized, Demonstrated           PT OP Goals       12/22/17 1100 12/22/17 1000    PT Short Term Goals    STG Date to Achieve  12/11/17  -NH    STG 1  Patient will be IND and compliant with HEP  -NH    STG 1 Progress  Met;Ongoing  -NH    STG 2  Patient will have PROM flexion to 160 degrees  -NH    STG 2 Progress  Met  -NH    STG 3  Patient will have PROM abduction to 130 degrees  -NH    STG 3 Progress  Met  -NH    STG 4  Patient will have PROM ER to 30 degrees  -NH    STG 4 Progress  Met  -NH    STG 5  Patient will reduce Quick Dash to 40  -NH    STG 5 Progress  Met  -NH    Long Term Goals    LTG Date to Achieve  12/25/17  -NH    LTG 1  Patient will reduce Quick Dash to 20  -NH    LTG 1 Progress  Ongoing  -NH    LTG 2  Patient will have R shld AROM flexion to 155 degrees  -NH    LTG 2 Progress  Progressing  -NH    LTG 3  Patient will have R shld AROM abduction to 150 degrees  -NH    LTG 3 Progress  Progressing  -NH    LTG 4  Patient will have active ER to 50 degrees measured at side  -NH    LTG 4 Progress  Met  -NH    LTG 5  Patient will have IR functional reach to L4/L5  -NH    LTG 5 Progress  Met  -NH    LTG 6  Patient will be able to tolerate 45 minute treatment session without increased pain on VAS  -NH    LTG 6 Progress  Met  -NH    Time Calculation    PT Goal Re-Cert Due Date 02/12/18  -NH       User Key  (r) = Recorded By, (t) = Taken By, (c) = Cosigned By    Initials Name Provider Type    NH Susie Watson, PT Physical Therapist                PT Assessment/Plan       12/22/17 1000       PT Assessment    Functional Limitations Decreased  safety during functional activities;Limitation in home management;Limitations in community activities;Limitations in functional capacity and performance;Performance in leisure activities;Performance in self-care ADL;Performance in work activities  -NH     Impairments Edema;Joint mobility;Muscle strength;Pain;Posture;Range of motion  -NH     Assessment Comments Patient progressing steadily s/p R RTC repair. Patient presenting with full PROM. AROM nearly full with slight UT compensation and weakness. Patient would benefit from cont skilled physical therapy to progress according to MD protocol safely.  -NH     Rehab Potential Good  -NH     Patient/caregiver participated in establishment of treatment plan and goals Yes  -NH     Patient would benefit from skilled therapy intervention Yes  -NH     PT Plan    PT Frequency 2x/week  -NH     Predicted Duration of Therapy Intervention (days/wks) 4 weeks  -NH     PT Plan Comments Advance strengthening according to protocol.   -NH       User Key  (r) = Recorded By, (t) = Taken By, (c) = Cosigned By    Initials Name Provider Type    NH Susie Watson, PT Physical Therapist                Modalities       12/22/17 1000          Ice    Location R shoulder  -NH      Rx Minutes 15 mins  -NH      Ice S/P Rx Yes  -NH        User Key  (r) = Recorded By, (t) = Taken By, (c) = Cosigned By    Initials Name Provider Type    NH Susie Watson, PT Physical Therapist              Exercises       12/22/17 1000          Subjective Comments    Subjective Comments Patient reports he slept on his R side and feels more sore today.  -NH      Subjective Pain    Able to rate subjective pain? yes  -NH      Pre-Treatment Pain Level 3  -NH      Aquatics    Aquatics performed? No  -NH      Exercise 1    Exercise Name 1 Pro II for AAROM  -NH      Time (Minutes) 1 10  -NH      Additional Comments 4.0  -NH      Exercise 3    Exercise Name 3 Wall wipe ABCs  -NH      Sets 3 1  -NH      Reps 3 1  -NH       Exercise 4    Exercise Name 4 Tband Rows  -NH      Sets 4 2  -NH      Reps 4 10  -NH      Additional Comments blue  -NH      Exercise 5    Exercise Name 5 Tband Shoulder Ext  -NH      Sets 5 2  -NH      Reps 5 10  -NH      Additional Comments blue  -NH      Exercise 6    Exercise Name 6 PRE: FF/Scap/abd to 90°  -NH      Sets 6 2  -NH      Reps 6 10  -NH      Exercise 7    Exercise Name 7 Wall pushups  -NH      Sets 7 2  -NH      Reps 7 10  -NH      Exercise 8    Exercise Name 8 Bicep curls/hammer curls  -NH      Sets 8 1  -NH      Reps 8 5  -NH      Additional Comments 3#  -NH      Exercise 9    Exercise Name 9 Serratus Punch  -NH      Sets 9 2  -NH      Reps 9 10  -NH      Additional Comments 5#  -NH      Exercise 10    Exercise Name 10 Supine [place] at 90° CW/CCW  -NH      Sets 10 2  -NH      Reps 10 10  -NH      Additional Comments 2#  -NH      Exercise 11    Exercise Name 11 TB IR/ER   -NH      Sets 11 2  -NH      Reps 11 10  -NH      Additional Comments Green IR/Red ER  -NH      Exercise 12    Exercise Name 12 sidelying abd  -NH      Sets 12 2  -NH      Reps 12 10  -NH      Additional Comments 1#  -NH      Exercise 13    Exercise Name 13 sidelying ER  -NH      Sets 13 2  -NH      Reps 13 10  -NH        User Key  (r) = Recorded By, (t) = Taken By, (c) = Cosigned By    Initials Name Provider Type    NH Susie Watson, PT Physical Therapist           Manual Rx (last 36 hours)      Manual Treatments       12/22/17 1000          Manual Rx 1    Manual Rx 1 Location R shoulder  -NH      Manual Rx 1 Type PROM flex, abd, ER, IR  -NH      Manual Rx 1 Duration 5  -NH      Manual Rx 2    Manual Rx 2 Location R shld  -NH      Manual Rx 2 Type resisted PNF D2 flex/ext, rhythmic stab supine 90 deg flexion  -NH      Manual Rx 2 Duration 3  -NH        User Key  (r) = Recorded By, (t) = Taken By, (c) = Cosigned By    Initials Name Provider Type    NH Susie Watson, PT Physical Therapist                      Outcome Measure  Options: Quick DASH  Quick DASH  Open a tight or new jar.: Moderate Difficulty  Do heavy household chores (e.g., wash walls, wash floors): Mild Difficulty  Carry a shopping bag or briefcase: Mild Difficulty  Wash your back: Mild Difficulty  Use a knife to cut food: No Difficulty  Recreational activities in which you take some force or impact through your arm, should or hand (e.g. golf, hammering, tennis, etc.): Moderate Difficulty  During the past week, to what extent has your arm, shoulder, or hand problem interfered with your normal social activites with family, friends, neighbors or groups?: Slightly  During the past week, were you limited in your work or other regular daily activities as a result of your arm, shoulder or hand problem?: Slightly Limited  Arm, Shoulder, or hand pain: Mild  Tingling (pins and needles) in your arm, shoulder, or hand: None  During the past week, how much difficulty have you had sleeping because of the pain in your arm, shoulder or hand?: Mild Difficulty  Number of Questions Answered: 11  Quick DASH Score: 25         Time Calculation:   Start Time: 1000  Stop Time: 1105  Time Calculation (min): 65 min  Total Timed Code Minutes- PT: 50 minute(s)     Therapy Charges for Today     Code Description Service Date Service Provider Modifiers Qty    04607443318 HC PT CARRY MOV HAND OBJ CURRENT 12/22/2017 Susie Watson, PT GP, CJ 1    42135617928 HC PT CARRY MOV HAND OBJ PROJECTED 12/22/2017 Susie Watson, PT GP, CI 1    56996606155 HC PT THER SUPP EA 15 MIN 12/22/2017 Susie Watson, PT GP 1    44569783140 HC PT MANUAL THERAPY EA 15 MIN 12/22/2017 Susie Watson, PT GP, KX 1    55446941158 HC PT THER PROC EA 15 MIN 12/22/2017 Susie Watson, PT GP, KX 2          PT G-Codes  PT Professional Judgement Used?: Yes  Outcome Measure Options: Quick DASH  Score: 25%  Functional Limitation: Carrying, moving and handling objects  Carrying, Moving and Handling Objects Current Status (): At  least 20 percent but less than 40 percent impaired, limited or restricted  Carrying, Moving and Handling Objects Goal Status (): At least 1 percent but less than 20 percent impaired, limited or restricted         Susie Watson, PT  12/22/2017

## 2017-12-27 ENCOUNTER — HOSPITAL ENCOUNTER (OUTPATIENT)
Dept: PHYSICAL THERAPY | Facility: HOSPITAL | Age: 67
Setting detail: THERAPIES SERIES
Discharge: HOME | End: 2017-12-27

## 2017-12-27 DIAGNOSIS — M19.011 PRIMARY OSTEOARTHRITIS, RIGHT SHOULDER: ICD-10-CM

## 2017-12-27 DIAGNOSIS — M75.21 BICEPS TENDINITIS OF RIGHT SHOULDER: ICD-10-CM

## 2017-12-27 DIAGNOSIS — Z98.890 S/P ROTATOR CUFF REPAIR: ICD-10-CM

## 2017-12-27 DIAGNOSIS — S46.011A TRAUMATIC COMPLETE TEAR OF RIGHT ROTATOR CUFF, INITIAL ENCOUNTER: Primary | ICD-10-CM

## 2017-12-27 PROCEDURE — 97110 THERAPEUTIC EXERCISES: CPT

## 2017-12-27 PROCEDURE — 97140 MANUAL THERAPY 1/> REGIONS: CPT

## 2017-12-27 NOTE — THERAPY TREATMENT NOTE
Outpatient Physical Therapy Ortho Treatment Note  [facility], [place], operated by [facility]     Patient Name: Emerson Sofia  : 1950  MRN: [id number]  Today's Date: 2017      Subjective Improvement: 75%       Attendance:    Approved: medicare guidelines MD follow up: 2018           date: 18    Visit Date: 2017    Visit Dx:    ICD-10-CM ICD-9-CM   1. Traumatic complete tear of right rotator cuff, initial encounter S46.011A 840.4   2. S/P rotator cuff repair Z98.890 V45.89   3. Biceps tendinitis of right shoulder M75.21 726.12   4. Primary osteoarthritis, right shoulder M19.011 715.11       There is no problem list on file for this patient.       Past Medical History:   Diagnosis Date   • High cholesterol         Past Surgical History:   Procedure Laterality Date   • PACEMAKER IMPLANTATION                               PT Assessment/Plan       17 1600       PT Assessment    Functional Limitations Decreased safety during functional activities;Limitation in home management;Limitations in community activities;Limitations in functional capacity and performance;Performance in leisure activities;Performance in self-care ADL;Performance in work activities  -NH     Impairments Edema;Joint mobility;Muscle strength;Pain;Posture;Range of motion  -NH     Assessment Comments Patient fatigues quickly with OH movements and with addition to 3 way PREs in standing. Patient advancing steadily with strengthening with occasional sharp pain anterior shld and down bicep. PROM WFL with slight discomfort end range flexion/abd.  -NH     Rehab Potential Good  -NH     Patient/caregiver participated in establishment of treatment plan and goals Yes  -NH     Patient would benefit from skilled therapy intervention Yes  -NH     PT Plan    PT Frequency 2x/week  -NH     Predicted Duration of Therapy Intervention (days/wks) 4 weeks  -NH     PT Plan Comments Advance strengthening as tolerated per protocol  -NH        User Key  (r) = Recorded By, (t) = Taken By, (c) = Cosigned By    Initials Name Provider Type    NH Susie Watson, PT Physical Therapist                Modalities       12/27/17 1700          Ice    Location R shoulder  -NH      Rx Minutes 15 mins  -NH      Ice S/P Rx Yes  -NH        User Key  (r) = Recorded By, (t) = Taken By, (c) = Cosigned By    Initials Name Provider Type    NH Susie Watson, PT Physical Therapist                Exercises       12/27/17 1600          Subjective Comments    Subjective Comments Patient brought back MD note and MD was happy with progress. Patient meets with MD again in Feb.   -NH      Subjective Pain    Able to rate subjective pain? yes  -NH      Pre-Treatment Pain Level 0  -NH      Post-Treatment Pain Level 0  -NH      Aquatics    Aquatics performed? No  -NH      Exercise 1    Exercise Name 1 Pro II for AAROM  -NH      Time (Minutes) 1 10  -NH      Exercise 3    Exercise Name 3 Wall wipe ABCs  -NH      Sets 3 1  -NH      Reps 3 1  -NH      Exercise 4    Exercise Name 4 Tband Rows  -NH      Sets 4 2  -NH      Reps 4 10  -NH      Additional Comments Black  -NH      Exercise 5    Exercise Name 5 Tband Shoulder Ext  -NH      Sets 5 2  -NH      Reps 5 10  -NH      Additional Comments Black  -NH      Exercise 6    Exercise Name 6 PRE: FF/Scap/abd to 90°  -NH      Sets 6 2  -NH      Reps 6 10  -NH      Additional Comments 1#  -NH      Exercise 7    Exercise Name 7 Wall pushups  -NH      Sets 7 2  -NH      Reps 7 10  -NH      Exercise 8    Exercise Name 8 Bicep curls/hammer curls  -NH      Sets 8 1  -NH      Reps 8 5  -NH      Additional Comments 3#  -NH      Exercise 9    Exercise Name 9 Serratus Punch  -NH      Sets 9 2  -NH      Reps 9 10  -NH      Additional Comments 5# bar  -NH      Exercise 10    Exercise Name 10 Supine [place] at 90° CW/CCW  -NH      Sets 10 2  -NH      Reps 10 10  -NH      Additional Comments 2#  -NH      Exercise 11    Exercise Name 11 TB IR/ER   -NH       Sets 11 2  -NH      Reps 11 10  -NH      Additional Comments Green IR/Red ER  -NH      Exercise 12    Exercise Name 12 sidelying abd  -NH      Sets 12 2  -NH      Reps 12 10  -NH      Additional Comments 2#  -NH      Exercise 13    Exercise Name 13 sidelying ER  -NH      Sets 13 2  -NH      Reps 13 10  -NH      Additional Comments 1#   -NH        User Key  (r) = Recorded By, (t) = Taken By, (c) = Cosigned By    Initials Name Provider Type    NH Susie Watson, PT Physical Therapist                        Manual Rx (last 36 hours)      Manual Treatments       12/27/17 1700          Manual Rx 1    Manual Rx 1 Location R shoulder  -NH      Manual Rx 1 Type PROM flex, abd, ER, IR  -NH      Manual Rx 1 Duration 5  -NH      Manual Rx 2    Manual Rx 2 Location R shld  -NH      Manual Rx 2 Type resisted PNF D2 flex/ext, rhythmic stab supine 90 deg flexion  -NH      Manual Rx 2 Duration 3  -NH        User Key  (r) = Recorded By, (t) = Taken By, (c) = Cosigned By    Initials Name Provider Type    NH Susie Watson, PT Physical Therapist                PT OP Goals       12/27/17 1600       PT Short Term Goals    STG Date to Achieve 12/11/17  -NH     STG 1 Patient will be IND and compliant with [facility]  -NH     STG 1 Progress Met;Ongoing  -NH     STG 2 Patient will have PROM flexion to 160 degrees  -NH     STG 2 Progress Met  -NH     STG 3 Patient will have PROM abduction to 130 degrees  -NH     STG 3 Progress Met  -NH     STG 4 Patient will have PROM ER to 30 degrees  -NH     STG 4 Progress Met  -NH     STG 5 Patient will reduce Quick Dash to 40  -NH     STG 5 Progress Met  -NH     Long Term Goals    LTG Date to Achieve 12/25/17  -NH     LTG 1 Patient will reduce Quick Dash to 20  -NH     LTG 1 Progress Ongoing  -NH     LTG 2 Patient will have R shld AROM flexion to 155 degrees  -NH     LTG 2 Progress Progressing  -NH     LTG 3 Patient will have R shld AROM abduction to 150 degrees  -NH     LTG 3 Progress Progressing  -NH      LTG 4 Patient will have active ER to 50 degrees measured at side  -NH     LTG 4 Progress Met  -NH     LTG 5 Patient will have IR functional reach to L4/L5  -NH     LTG 5 Progress Met  -NH     LTG 6 Patient will be able to tolerate 45 minute treatment session without increased pain on VAS  -NH     LTG 6 Progress [facility]  -NH     Time Calculation    PT Goal Re-Cert Due Date 01/12/18  -NH       User Key  (r) = Recorded By, (t) = Taken By, (c) = Cosigned By    Initials Name Provider Type    NH Susie Watson, PT Physical Therapist          Therapy Education  Given: HEP, Symptoms/condition management, Posture/body mechanics  Program: Reinforced  How Provided: Verbal, Demonstration, Written  Provided to: Patient  Level of Understanding: Teach back education performed, Verbalized, Demonstrated              Time Calculation:   Start Time: 1604  Stop Time: 1705  Time Calculation (min): 61 min  Total Timed Code Minutes- PT: 46 minute(s)    Therapy Charges for Today     Code Description Service Date Service Provider Modifiers Qty    86187943250 HC PT MANUAL THERAPY EA 15 MIN 12/27/2017 Susie Watson, PT GP, KX 1    50928935145 HC PT THER PROC EA 15 MIN 12/27/2017 Susie Watson, PT GP, KX 2    54277307593 HC PT THER SUPP EA 15 MIN 12/27/2017 Susie Watson, PT GP 1                    Susie Watson, PT  12/27/2017

## 2018-01-02 ENCOUNTER — HOSPITAL ENCOUNTER (OUTPATIENT)
Dept: PHYSICAL THERAPY | Facility: HOSPITAL | Age: 68
Setting detail: THERAPIES SERIES
Discharge: HOME | End: 2018-01-02

## 2018-01-02 DIAGNOSIS — S46.011A TRAUMATIC COMPLETE TEAR OF RIGHT ROTATOR CUFF, INITIAL ENCOUNTER: Primary | ICD-10-CM

## 2018-01-02 DIAGNOSIS — Z98.890 S/P ROTATOR CUFF REPAIR: ICD-10-CM

## 2018-01-02 PROCEDURE — 97110 THERAPEUTIC EXERCISES: CPT | Performed by: PHYSICAL THERAPY ASSISTANT

## 2018-01-02 NOTE — THERAPY TREATMENT NOTE
Outpatient Physical Therapy Ortho Treatment Note  [facility]     Patient Name: Emerson Sofia  : 1950  MRN: [id number]  Today's Date: 2018      Visit Date: 2018     Subjective Improvement: 75%       Attendance:   Approved: medicare guidelines           MD follow up: 2018          RC date: 18    Visit Dx:    ICD-10-CM ICD-9-CM   1. Traumatic complete tear of right rotator cuff, initial encounter S46.011A 840.4   2. S/P rotator cuff repair Z98.890 V45.89       There is no problem list on file for this patient.       Past Medical History:   Diagnosis Date   • High cholesterol         Past Surgical History:   Procedure Laterality Date   • PACEMAKER IMPLANTATION               PT Ortho       18 1300    Precautions and Contraindications    Precautions See Protocol  -    Contraindications Pacemaker: No IFC  -    Subjective Pain    Post-Treatment Pain Level 0  -      User Key  (r) = Recorded By, (t) = Taken By, (c) = Cosigned By    Initials Name Provider Type     William Vasquez PTA Physical Therapy Assistant                            PT Assessment/Plan       18 1300       PT Assessment    Assessment Comments good tolerance to increased ther ex this date no conplaints with any exercise  -     PT Plan    PT Frequency 2x/week  -     Predicted Duration of Therapy Intervention (days/wks) 4 weeks  -     PT Plan Comments cont to progress as madhu per protocol  -       User Key  (r) = Recorded By, (t) = Taken By, (c) = Cosigned By    Initials Name Provider Type    DARIA Vasquez PTA Physical Therapy Assistant                Modalities       18 1300          Ice    Ice Applied Yes  -      Location R shoulder  -      Rx Minutes 15 mins  -      Ice S/P Rx Yes  -        User Key  (r) = Recorded By, (t) = Taken By, (c) = Cosigned By    Initials Name Provider Type    DARIA Vasquez PTA Physical Therapy Assistant                Exercises        01/02/18 1300          Subjective Comments    Subjective Comments Pt reports he is getting better, Pt states the pain is low but he knows it is there  -JH      Subjective Pain    Able to rate subjective pain? yes  -JH      Pre-Treatment Pain Level 2  -JH      Post-Treatment Pain Level 0  -JH      Exercise 1    Exercise Name 1 Pro II for AAROM  -JH      Time (Minutes) 1 10  -JH      Additional Comments L4  -JH      Exercise 2    Exercise Name 2 [place] FF/scap  -JH      Time (Minutes) 2 2 min ea  -JH      Exercise 3    Exercise Name 3 Wall wipe ABCs  -JH      Sets 3 1  -JH      Reps 3 1  -JH      Additional Comments 1#cw  -JH      Exercise 4    Exercise Name 4 Tband Rows  -JH      Sets 4 2  -JH      Reps 4 10  -JH      Additional Comments blk  -JH      Exercise 5    Exercise Name 5 Tband Shoulder Ext  -JH      Sets 5 2  -JH      Reps 5 10  -JH      Additional Comments blk  -JH      Exercise 6    Exercise Name 6 PRE: FF/Scap/abd to 90°  -JH      Sets 6 2  -JH      Reps 6 10  -JH      Additional Comments 1#db  -JH      Exercise 7    Exercise Name 7 Wall pushups  -JH      Sets 7 2  -JH      Reps 7 10  -JH      Exercise 8    Exercise Name 8 Bicep curls/hammer curls  -JH      Sets 8 3  -JH      Reps 8 10  -JH      Additional Comments 4#cw  -JH      Exercise 9    Exercise Name 9 Serratus Punch  -JH      Sets 9 3  -JH      Reps 9 10  -JH      Additional Comments 5#db  -JH      Exercise 10    Exercise Name 10 Supine [place] at 90° CW/CCW  -JH      Sets 10 2  -JH      Reps 10 10  -JH      Additional Comments 5#db  -JH      Exercise 11    Exercise Name 11 chest press  -JH      Sets 11 2  -JH      Reps 11 10  -JH      Additional Comments 5#db  -JH      Exercise 12    Exercise Name 12 YTI  -JH      Sets 12 2  -JH      Reps 12 10  -JH        User Key  (r) = Recorded By, (t) = Taken By, (c) = Cosigned By    Initials Name Provider Type    DARIA Vasquez PTA Physical Therapy Assistant                               PT OP Goals        01/02/18 1300       PT Short Term Goals    STG Date to Achieve 12/11/17  -     STG 1 Patient will be IND and compliant with HEP  -     STG 1 Progress Met;Ongoing  -     STG 2 Patient will have PROM flexion to 160 degrees  -     STG 2 Progress Met  -     STG 3 Patient will have PROM abduction to 130 degrees  -     STG 3 Progress Met  -     STG 4 Patient will have PROM ER to 30 degrees  -     STG 4 Progress Met  -     STG 5 Patient will reduce Quick Dash to 40  -     STG 5 Progress Met  -     Long Term Goals    LTG Date to Achieve 12/25/17  -     LTG 1 Patient will reduce Quick Dash to 20  -     LTG 1 Progress Ongoing  -     LTG 2 Patient will have R shld AROM flexion to 155 degrees  -     LTG 2 Progress Progressing  -     LTG 3 Patient will have R shld AROM abduction to 150 degrees  -     LTG 3 Progress Progressing  -     LTG 4 Patient will have active ER to 50 degrees measured at side  -     LTG 4 Progress Met  -     LTG 5 Patient will have IR functional reach to L4/L5  -     LTG 5 Progress Met  -     LTG 6 Patient will be able to tolerate 45 minute treatment session without increased pain on VAS  -     LTG 6 Progress [facility]     Time Calculation    PT Goal Re-Cert Due Date 01/12/18  -       User Key  (r) = Recorded By, (t) = Taken By, (c) = Cosigned By    Initials Name Provider Type     William Vasquez PTA Physical Therapy Assistant                         Time Calculation:   Start Time: 1300  Stop Time: 1353  Time Calculation (min): 53 min  PT Non-Billable Time (min): 10 min    Therapy Charges for Today     Code Description Service Date Service Provider Modifiers Qty    55889686540 HC PT THER PROC EA 15 MIN 1/2/2018 William Vasquez PTA GP, KX 3    39671581827 HC PT THER SUPP EA 15 MIN 1/2/2018 William Vasquez PTA GP 1                    William Vasquez PTA  1/2/2018

## 2018-01-04 ENCOUNTER — HOSPITAL ENCOUNTER (OUTPATIENT)
Dept: PHYSICAL THERAPY | Facility: HOSPITAL | Age: 68
Setting detail: THERAPIES SERIES
Discharge: HOME | End: 2018-01-04

## 2018-01-04 DIAGNOSIS — S46.011A TRAUMATIC COMPLETE TEAR OF RIGHT ROTATOR CUFF, INITIAL ENCOUNTER: Primary | ICD-10-CM

## 2018-01-04 DIAGNOSIS — Z98.890 S/P ROTATOR CUFF REPAIR: ICD-10-CM

## 2018-01-04 PROCEDURE — 97110 THERAPEUTIC EXERCISES: CPT | Performed by: PHYSICAL THERAPIST

## 2018-01-05 NOTE — THERAPY TREATMENT NOTE
Outpatient Physical Therapy Ortho Treatment Note  [facility], [place]     Patient Name: Emerson Sofia  : 1950  MRN: [id number]  Today's Date: 2018      Visit Date: 2018     Subjective Improvement: 75%      Attendance:   Approved:    Medicare guidelines       MD follow up:   2018         date:     18      Visit Dx:    ICD-10-CM ICD-9-CM   1. Traumatic complete tear of right rotator cuff, initial encounter S46.011A 840.4   2. S/P rotator cuff repair Z98.890 V45.89       There is no problem list on file for this patient.       Past Medical History:   Diagnosis Date   • High cholesterol         Past Surgical History:   Procedure Laterality Date   • PACEMAKER IMPLANTATION               PT Ortho       18 1300    Subjective Comments    Subjective Comments Pt states shoulder still continues to do well. Has slight pain at times with exercises at anterior shoulder, but mild.  -KG    Precautions and Contraindications    Precautions See Protocol  -KG    Contraindications Pacemaker: No IFC  -KG    Subjective Pain    Post-Treatment Pain Level 0  -KG    Posture/Observations    Posture/Observations Comments No acute distress.  -KG    Right Shoulder    Flexion AROM Deficit 160  -KG    ABduction AROM Deficit 148  -KG      18 1300    Precautions and Contraindications    Precautions See Protocol  -JH    Contraindications Pacemaker: No IFC  -JH    Subjective Pain    Post-Treatment Pain Level 0  -JH      User Key  (r) = Recorded By, (t) = Taken By, (c) = Cosigned By    Initials Name Provider Type    THUY Negro, PT Physical Therapist     William Vasquez PTA Physical Therapy Assistant                            PT Assessment/Plan       18 1300       PT Assessment    Functional Limitations Decreased safety during functional activities;Limitation in home management;Limitations in community activities;Limitations in functional capacity and performance;Performance in  leisure activities;Performance in self-care ADL;Performance in work activities  -KG     Impairments Edema;Joint mobility;Muscle strength;Pain;Posture;Range of motion  -KG     Assessment Comments Demonstrates improved shoulder flex/abd AROM this date. Continues to compensate in scap/UT with active shoulder abd but improving. Continues lack muscle endurance in RTC at this time.  -KG     Rehab Potential Good  -KG     Patient/caregiver participated in establishment of treatment plan and goals Yes  -KG     Patient would benefit from skilled therapy intervention Yes  -KG     PT Plan    PT Frequency 2x/week  -KG     Predicted Duration of Therapy Intervention (days/wks) 4 weeks  -KG     PT Plan Comments Continue to progress shoulder/RTC as tolerated per protocol. Possible cybex. Give pt black tband next  -KG       User Key  (r) = Recorded By, (t) = Taken By, (c) = Cosigned By    Initials Name Provider Type    THUY Negro, PT Physical Therapist                Modalities       01/04/18 1300          Ice    Ice Applied Yes  -KG      Location R shoulder  -KG      Rx Minutes 10 mins  -KG      Ice S/P Rx Yes  -KG        User Key  (r) = Recorded By, (t) = Taken By, (c) = Cosigned By    Initials Name Provider Type    THUY Negro, PT Physical Therapist                Exercises       01/04/18 1300          Subjective Comments    Subjective Comments Pt states shoulder still continues to do well. Has slight pain at times with exercises at anterior shoulder, but mild.  -KG      Subjective Pain    Able to rate subjective pain? yes  -KG      Pre-Treatment Pain Level 0  -KG      Post-Treatment Pain Level 0  -KG      Aquatics    Aquatics performed? No  -KG      Exercise 1    Exercise Name 1 Pro II for strength/endurance  -KG      Time (Minutes) 1 10 min  -KG      Additional Comments L5  -KG      Exercise 2    Exercise Name 2 Tband Rows  -KG      Sets 2 2  -KG      Reps 2 10  -KG      Additional Comments Black  -KG       Exercise 3    Exercise Name 3 Tband shoulder ex  -KG      Sets 3 2  -KG      Reps 3 10  -KG      Additional Comments Black  -KG      Exercise 4    Exercise Name 4 Tband shoulder IR  -KG      Sets 4 2  -KG      Reps 4 10  -KG      Additional Comments Green  -KG      Exercise 5    Exercise Name 5 Tband shoulder ER  -KG      Sets 5 2  -KG      Reps 5 10  -KG      Additional Comments Red  -KG      Exercise 6    Exercise Name 6 Wall Push ups  -KG      Sets 6 2  -KG      Reps 6 10  -KG      Exercise 7    Exercise Name 7 PRE: FF/Scap/abd to 90°  -KG      Sets 7 2  -KG      Reps 7 10  -KG      Additional Comments 1# DB  -KG      Exercise 8    Exercise Name 8 CC: Mid Rows  -KG      Sets 8 2  -KG      Reps 8 10  -KG      Additional Comments 5 plate  -KG      Exercise 9    Exercise Name 9 Pball YTI  -KG      Sets 9 2  -KG      Reps 9 10  -KG      Exercise 10    Exercise Name 10 Standing PNF D2 flex/ext  -KG      Sets 10 2  -KG      Reps 10 10  -KG      Exercise 11    Exercise Name 11 Supine serratus punches  -KG      Sets 11 2  -KG      Reps 11 10  -KG      Additional Comments 5# bar  -KG      Exercise 12    Exercise Name 12 Supine [place] at 90° CW/CCW  -KG      Sets 12 2  -KG      Reps 12 10  -KG      Additional Comments 4# DB  -KG      Exercise 13    Exercise Name 13 sidelying abd  -KG      Sets 13 2  -KG      Reps 13 10  -KG      Additional Comments 2# DB  -KG        User Key  (r) = Recorded By, (t) = Taken By, (c) = Cosigned By    Initials Name Provider Type    THUY Negro PT Physical Therapist                        Manual Rx (last 36 hours)      Manual Treatments       01/04/18 1300          Manual Rx 1    Manual Rx 1 Location R shoulder  -KG      Manual Rx 1 Type PROM flex, abd, ER, IR  -KG      Manual Rx 1 Duration 3 min; WFL  -KG        User Key  (r) = Recorded By, (t) = Taken By, (c) = Cosigned By    Initials Name Provider Type    THUY Negro PT Physical Therapist                PT OP Goals        01/04/18 1300       PT Short Term Goals    STG Date to Achieve --   All STG's met  -KG     STG 1 Patient will be IND and compliant with HEP  -KG     STG 1 Progress Met;Ongoing  -KG     STG 2 Patient will have PROM flexion to 160 degrees  -KG     STG 2 Progress Met  -KG     STG 3 Patient will have PROM abduction to 130 degrees  -KG     STG 3 Progress Met  -KG     STG 4 Patient will have PROM ER to 30 degrees  -KG     STG 4 Progress Met  -KG     STG 5 Patient will reduce Quick Dash to 40  -KG     STG 5 Progress Met  -KG     Long Term Goals    LTG Date to Achieve 01/12/18  -KG     LTG 1 Patient will reduce Quick Dash to 20  -KG     LTG 1 Progress Ongoing  -KG     LTG 2 Patient will have R shld AROM flexion to 155 degrees  -KG     LTG 2 Progress Met  -KG     LTG 3 Patient will have R shld AROM abduction to 150 degrees  -KG     LTG 3 Progress Ongoing  -KG     LTG 4 Patient will have active ER to 50 degrees measured at side  -KG     LTG 4 Progress Met  -KG     LTG 5 Patient will have IR functional reach to L4/L5  -KG     LTG 5 Progress Met  -KG     LTG 6 Patient will be able to tolerate 45 minute treatment session without increased pain on VAS  -KG     LTG 6 Progress Met  -KG     Time Calculation    PT Goal Re-Cert Due Date 01/12/18  -KG       User Key  (r) = Recorded By, (t) = Taken By, (c) = Cosigned By    Initials Name Provider Type    KG Erica Negro PT Physical Therapist          Therapy Education  Given: HEP, Symptoms/condition management  Program: Reinforced  How Provided: Verbal  Provided to: Patient  Level of Understanding: Verbalized, Demonstrated              Time Calculation:   Start Time: 1300  Stop Time: 1400  Time Calculation (min): 60 min  Total Timed Code Minutes- PT: 50 minute(s)    Therapy Charges for Today     Code Description Service Date Service Provider Modifiers Qty    91355646916 HC PT THER PROC EA 15 MIN 1/4/2018 Erica Negro, PT GP 3    01907361267 HC PT THER SUPP EA 15 MIN 1/4/2018  Erica Negro, PT GP 1                    Erica Negro, PT  1/4/2018

## 2018-01-10 ENCOUNTER — HOSPITAL ENCOUNTER (OUTPATIENT)
Dept: PHYSICAL THERAPY | Facility: HOSPITAL | Age: 68
Setting detail: THERAPIES SERIES
Discharge: HOME | End: 2018-01-10

## 2018-01-10 DIAGNOSIS — Z98.890 S/P ROTATOR CUFF REPAIR: ICD-10-CM

## 2018-01-10 DIAGNOSIS — S46.012A TRAUMATIC COMPLETE TEAR OF LEFT ROTATOR CUFF, INITIAL ENCOUNTER: ICD-10-CM

## 2018-01-10 DIAGNOSIS — M19.011 PRIMARY OSTEOARTHRITIS, RIGHT SHOULDER: ICD-10-CM

## 2018-01-10 DIAGNOSIS — M75.21 BICEPS TENDINITIS OF RIGHT SHOULDER: ICD-10-CM

## 2018-01-10 DIAGNOSIS — S46.011A TRAUMATIC COMPLETE TEAR OF RIGHT ROTATOR CUFF, INITIAL ENCOUNTER: Primary | ICD-10-CM

## 2018-01-10 PROCEDURE — 97110 THERAPEUTIC EXERCISES: CPT

## 2018-01-10 NOTE — THERAPY TREATMENT NOTE
Outpatient Physical Therapy Ortho Treatment Note  [facility]  Yoli Christopher PTA       Patient Name: Emerson Sofia  : 1950  MRN: [id number]  Today's Date: 1/10/2018      Visit Date: 01/10/2018     Visits:   Insurance Visits Approved: based on medical necessity and medicare cap  Recert Due: 2018  MD Appt: 2018  Pain: pretreatment 0/10; post treatment 0/10  Improvement: pt is subjectively reporting 75% improvement since initial evaluation    Visit Dx:    ICD-10-CM ICD-9-CM   1. Traumatic complete tear of right rotator cuff, initial encounter S46.011A 840.4   2. S/P rotator cuff repair Z98.890 V45.89   3. Biceps tendinitis of right shoulder M75.21 726.12   4. Primary osteoarthritis, right shoulder M19.011 715.11   5. Traumatic complete tear of left rotator cuff, initial encounter S46.012A 840.4       There is no problem list on file for this patient.       Past Medical History:   Diagnosis Date   • High cholesterol         Past Surgical History:   Procedure Laterality Date   • PACEMAKER IMPLANTATION     • ROTATOR CUFF REPAIR Right 10/2017             PT Ortho       01/10/18 1300    Subjective Comments    Subjective Comments patient states that he is doing well today. denies pain.   -    Precautions and Contraindications    Precautions See Protocol  -    Contraindications Pacemaker: No IFC  -    Subjective Pain    Able to rate subjective pain? yes  -    Pre-Treatment Pain Level 0  -    Post-Treatment Pain Level 0  -    Posture/Observations    Posture/Observations Comments No acute distress. pt is 13 weeks and 6 days post op  -      User Key  (r) = Recorded By, (t) = Taken By, (c) = Cosigned By    Initials Name Provider Type     Yoli Christopher PTA Physical Therapy Assistant                            PT Assessment/Plan       01/10/18 1300       PT Assessment    Assessment Comments patient tolerates cybex equipment with no complaints. is able to demonstrate  independence with verbal and demo review of tband exercises that were previously provided for HEP. Resistance for those at home have been increased to a black theraband and patient has been issued a black band for use at home. patient does demonstrate some fatigue with therex today.   -     PT Plan    PT Frequency 2x/week  -     PT Plan Comments Body Blade 1H Punches and IR/ER  -MH       User Key  (r) = Recorded By, (t) = Taken By, (c) = Cosigned By    Initials Name Provider Type    JUANIS Christopher PTA Physical Therapy Assistant                Modalities       01/10/18 1300          Ice    Ice Applied Yes  -MH      Location  R shoulder  -MH      Rx Minutes 15 mins  -MH      Ice S/P Rx Yes  -MH        User Key  (r) = Recorded By, (t) = Taken By, (c) = Cosigned By    Initials Name Provider Type     Yoli Christopher PTA Physical Therapy Assistant                Exercises       01/10/18 1300          Subjective Comments    Subjective Comments patient states that he is doing well today. denies pain.   -      Subjective Pain    Able to rate subjective pain? yes  -      Pre-Treatment Pain Level 0  -MH      Post-Treatment Pain Level 0  -MH      Exercise 1    Exercise Name 1 Pro II UE fwd/Retro  -MH      Time (Minutes) 1 10 minutes  -MH      Additional Comments L 5.0  -MH      Exercise 2    Exercise Name 2 Body Blade 2H 45-  -MH      Reps 2 2  -MH      Time (Seconds) 2 30 sec   -MH      Exercise 3    Exercise Name 3 Body Blade 1H Abd 0-45-90  -MH      Reps 3 2  -MH      Time (Seconds) 3 30 sec   -MH      Exercise 4    Exercise Name 4 Tband Rows Mid/Low  -MH      Reps 4 5  -MH      Additional Comments Black (review and issued Black band for HEP)  -MH      Exercise 5    Exercise Name 5 Tband Shoulder Ext  -MH      Reps 5 5  -MH      Additional Comments Black (review and issued Black band for HEP)  -MH      Exercise 6    Exercise Name 6 Cybex Rows   -MH      Sets 6 2  -MH      Reps 6 10  -MH      Additional  Comments 50#  -MH      Exercise 7    Exercise Name 7 Cybex Lat Pulls  -MH      Sets 7 2  -MH      Reps 7 10  -MH      Additional Comments 50#  -MH      Exercise 8    Exercise Name 8 Cybex Rear Delts  -MH      Sets 8 2  -MH      Reps 8 10  -MH      Additional Comments 50#  -MH      Exercise 9    Exercise Name 9 Cybex Fly  -MH      Sets 9 2  -MH      Reps 9 10  -MH      Additional Comments 30#  -MH      Exercise 10    Exercise Name 10 Cybex Chest Press  -MH      Sets 10 2  -MH      Reps 10 10  -MH      Additional Comments 30#  -MH      Exercise 11    Exercise Name 11 Prone over Physioball YTI   -MH      Sets 11 2  -MH      Reps 11 10  -MH      Additional Comments 2# dumbbell  -        User Key  (r) = Recorded By, (t) = Taken By, (c) = Cosigned By    Initials Name Provider Type     Yoli Christopher, PTA Physical Therapy Assistant                               PT OP Goals       01/10/18 1300       PT Short Term Goals    STG Date to Achieve --   All STG's met  -     STG 1 Patient will be IND and compliant with HEP  -MH     STG 1 Progress Met;Ongoing  -MH     STG 2 Patient will have PROM flexion to 160 degrees  -     STG 2 Progress Met  -MH     STG 3 Patient will have PROM abduction to 130 degrees  -     STG 3 Progress Met  -     STG 4 Patient will have PROM ER to 30 degrees  -     STG 4 Progress Met  -     STG 5 Patient will reduce Quick Dash to 40  -MH     STG 5 Progress Met  -     Long Term Goals    LTG Date to Achieve 01/12/18  -     LTG 1 Patient will reduce Quick Dash to 20  -MH     LTG 1 Progress Ongoing  -     LTG 2 Patient will have R shld AROM flexion to 155 degrees  -     LTG 2 Progress Met  -MH     LTG 3 Patient will have R shld AROM abduction to 150 degrees  -     LTG 3 Progress Ongoing  -MH     LTG 4 Patient will have active ER to 50 degrees measured at side  -     LTG 4 Progress Met  -     LTG 5 Patient will have IR functional reach to L4/L5  -     LTG 5 Progress Met  -MH      LTG 6 Patient will be able to tolerate 45 minute treatment session without increased pain on VAS  -     LTG 6 Progress Met  -     Time Calculation    PT Goal Re-Cert Due Date 01/12/18  -       User Key  (r) = Recorded By, (t) = Taken By, (c) = Cosigned By    Initials Name Provider Type     Yoli Christopher PTA Physical Therapy Assistant          Therapy Education  Education Details: Black theraband for HEP  Given: HEP, Symptoms/condition management, Pain management, Posture/body mechanics  Program: Progressed  How Provided: Verbal, Demonstration  Provided to: Patient  Level of Understanding: Verbalized, Demonstrated, Teach back education performed              Time Calculation:   Start Time: 1300  Stop Time: 1358  Time Calculation (min): 58 min  PT Non-Billable Time (min): 15 min  Total Timed Code Minutes- PT: 43 minute(s)    Therapy Charges for Today     Code Description Service Date Service Provider Modifiers Qty    28052357674 HC PT THER SUPP EA 15 MIN 1/10/2018 Yoli Christopher PTA GP 1    94414348164 HC PT THER PROC EA 15 MIN 1/10/2018 Yoli Christopher PTA GP 3                    Yoli Christopher PTA  1/10/2018

## 2018-01-15 ENCOUNTER — APPOINTMENT (OUTPATIENT)
Dept: PHYSICAL THERAPY | Facility: HOSPITAL | Age: 68
End: 2018-01-15

## 2018-01-17 ENCOUNTER — APPOINTMENT (OUTPATIENT)
Dept: PHYSICAL THERAPY | Facility: HOSPITAL | Age: 68
End: 2018-01-17

## 2018-01-24 ENCOUNTER — HOSPITAL ENCOUNTER (OUTPATIENT)
Dept: PHYSICAL THERAPY | Facility: HOSPITAL | Age: 68
Setting detail: THERAPIES SERIES
Discharge: HOME | End: 2018-01-24

## 2018-01-24 DIAGNOSIS — S46.011A TRAUMATIC COMPLETE TEAR OF RIGHT ROTATOR CUFF, INITIAL ENCOUNTER: Primary | ICD-10-CM

## 2018-01-24 DIAGNOSIS — Z98.890 S/P ROTATOR CUFF REPAIR: ICD-10-CM

## 2018-01-24 PROCEDURE — G8984 CARRY CURRENT STATUS: HCPCS | Performed by: PHYSICAL THERAPIST

## 2018-01-24 PROCEDURE — 97110 THERAPEUTIC EXERCISES: CPT | Performed by: PHYSICAL THERAPIST

## 2018-01-24 PROCEDURE — G8985 CARRY GOAL STATUS: HCPCS | Performed by: PHYSICAL THERAPIST

## 2018-01-25 NOTE — THERAPY PROGRESS REPORT/RE-CERT
Outpatient Physical Therapy Ortho Progress Note  [facility], operated by [facility]     Patient Name: Emerson Sofia  : 1950  MRN: [id number]  Today's Date: 2018      Visit Date: 2018     Subjective Improvement: 80-85%       Attendance:   Approved:  Med necessity/medicare guidelines         MD follow up:  2018; will let us know date next visit         RC date:  18         There is no problem list on file for this patient.       Past Medical History:   Diagnosis Date   • High cholesterol         Past Surgical History:   Procedure Laterality Date   • PACEMAKER IMPLANTATION     • ROTATOR CUFF REPAIR Right 10/2017       Visit Dx:     ICD-10-CM ICD-9-CM   1. Traumatic complete tear of right rotator cuff, initial encounter S46.011A 840.4   2. S/P rotator cuff repair Z98.890 V45.89                 PT Ortho       18 1400    Precautions and Contraindications    Precautions See Protocol  -KG    Contraindications Pacemaker: No IFC  -KG    Posture/Observations    Posture/Observations Comments No acute distress. Pt 16 weeks post op. No RUE guarding noted. Denies TTP at shoulder this date.  -KG    Right Shoulder    Flexion AROM Deficit 165  -KG    Flexion PROM WNL (0-180 degrees)  -KG    ABduction AROM Deficit 150  -KG    ABduction PROM WNL (0-180 degrees)  -KG    External Rotation AROM Deficit 70 arm at side  -KG    External Rotation PROM within functional limits  -KG    Internal Rotation AROM Deficit T12 functional reach  -KG    Internal Rotation PROM WNL (0-70 degrees)  -KG    Right Shoulder    Flexion Gross Movement (4/5) good  -KG    ABduction Gross Movement (4/5) good  -KG    Int Rotation Gross Movement (4+/5) good plus  -KG    Ext Rotation Gross Movement (4-/5) good minus  -KG      User Key  (r) = Recorded By, (t) = Taken By, (c) = Cosigned By    Initials Name Provider Type    KG Erica Negro, ABRIL Physical Therapist                      Therapy Education  Given: HEP, Symptoms/condition  management  Program: Reinforced  How Provided: Verbal  Provided to: Patient  Level of Understanding: Verbalized, Demonstrated           PT OP Goals       01/24/18 1400       PT Short Term Goals    STG Date to Achieve --   All STG's met  -KG     STG 1 Patient will be IND and compliant with HEP  -KG     STG 1 Progress Met;Ongoing  -KG     STG 2 Patient will have PROM flexion to 160 degrees  -KG     STG 2 Progress Met  -KG     STG 3 Patient will have PROM abduction to 130 degrees  -KG     STG 3 Progress Met  -KG     STG 4 Patient will have PROM ER to 30 degrees  -KG     STG 4 Progress Met  -KG     STG 5 Patient will reduce Quick Dash to 40  -KG     STG 5 Progress Met  -KG     Long Term Goals    LTG Date to Achieve 02/14/18  -KG     LTG 1 Patient will reduce Quick Dash to 20  -KG     LTG 1 Progress Met  -KG     LTG 2 Patient will have R shld AROM flexion to 155 degrees  -KG     LTG 2 Progress Met  -KG     LTG 3 Patient will have R shld AROM abduction to 150 degrees  -KG     LTG 3 Progress Met  -KG     LTG 4 Patient will have active ER to 50 degrees measured at side  -KG     LTG 4 Progress Met  -KG     LTG 5 Patient will have IR functional reach to L4/L5  -KG     LTG 5 Progress Met  -KG     LTG 6 Patient will be able to tolerate 45 minute treatment session without increased pain on VAS  -KG     LTG 6 Progress Met  -KG     LTG 7 Demonstrate R shoulder flex/abd MMT to 4+/5 or greater  -KG     LTG 7 Progress New  -KG     LTG 8 Demonstrate R shoulder ER MMT to 4+/5 or greater  -KG     LTG 8 Progress New  -KG     LTG 9 Independent with final HEP/fitness  -KG     LTG 9 Progress New  -KG     LTG 10 9  -KG     Time Calculation    PT Goal Re-Cert Due Date 02/14/18  -KG       User Key  (r) = Recorded By, (t) = Taken By, (c) = Cosigned By    Initials Name Provider Type    KG Erica Negro, PT Physical Therapist                PT Assessment/Plan       01/24/18 1600       PT Assessment    Functional Limitations Decreased safety  during functional activities;Limitation in home management;Limitations in community activities;Limitations in functional capacity and performance;Performance in leisure activities;Performance in self-care ADL;Performance in work activities  -KG     Impairments Joint mobility;Muscle strength;Pain;Posture;Range of motion  -KG     Assessment Comments Pt progressing very well with PT at this time. Good improvements noted in overall shoulder AROM & strength since last re-evaluation. PROM WNL at this time. Pt does well with new therex with minimal cues required for performance. Pt continues to lack functional muscle endurance at this time & will continue to benefit from skilled PT to address these deficits.  -KG     Rehab Potential Good  -KG     Patient/caregiver participated in establishment of treatment plan and goals Yes  -KG     Patient would benefit from skilled therapy intervention Yes  -KG     PT Plan    PT Frequency 2x/week;1x/week  -KG     Predicted Duration of Therapy Intervention (days/wks) 3-4 more weeks  -KG     PT Plan Comments Will continue until MD appointment. Continue progressive strengthening, scap stability. Possible d/c after MD appointment.  -KG       User Key  (r) = Recorded By, (t) = Taken By, (c) = Cosigned By    Initials Name Provider Type    THUY Negro PT Physical Therapist                Modalities       01/24/18 1400          Subjective Comments    Subjective Comments Pt states his shoulder is getting much better. States he can actually lay on his R side in bed now without pain. States he's able to lift more & carry more for greater distances.  -KG      Subjective Pain    Post-Treatment Pain Level 0  -KG      Ice    Ice Applied Yes  -KG      Location  R shoulder  -KG      Rx Minutes 15 mins  -KG      Ice S/P Rx Yes  -KG        User Key  (r) = Recorded By, (t) = Taken By, (c) = Cosigned By    Initials Name Provider Type    THUY Negro PT Physical Therapist               Exercises       01/24/18 1400          Subjective Comments    Subjective Comments Pt states his shoulder is getting much better. States he can actually lay on his R side in bed now without pain. States he's able to lift more & carry more for greater distances.  -KG      Subjective Pain    Able to rate subjective pain? yes  -KG      Pre-Treatment Pain Level 0  -KG      Post-Treatment Pain Level 0  -KG      Aquatics    Aquatics performed? No  -KG      Exercise 1    Exercise Name 1 Pro II UE fwd/Retro  -KG      Time (Minutes) 1 10 minutes  -KG      Additional Comments L 5.0  -KG      Exercise 2    Exercise Name 2 Cybex Rows  -KG      Sets 2 2  -KG      Reps 2 10  -KG      Additional Comments 50#  -KG      Exercise 3    Exercise Name 3 Cybex Incline pull  -KG      Sets 3 2  -KG      Reps 3 10  -KG      Additional Comments 50#  -KG      Exercise 4    Exercise Name 4 Cybex Rear Delt  -KG      Sets 4 2  -KG      Reps 4 10  -KG      Additional Comments 45#  -KG      Exercise 5    Exercise Name 5 Cybex Reverse Fly  -KG      Sets 5 2  -KG      Reps 5 10  -KG      Additional Comments 30#  -KG      Exercise 6    Exercise Name 6 Cybex Chest Press  -KG      Sets 6 2  -KG      Reps 6 10  -KG      Additional Comments 30#  -KG      Exercise 7    Exercise Name 7 Body Blade 2H   -KG      Reps 7 2  -KG      Time (Seconds) 7 30  -KG      Exercise 8    Exercise Name 8 Body Blade 1H Abd 45-90  -KG      Reps 8 2  -KG      Time (Seconds) 8 30  -KG      Exercise 9    Exercise Name 9 Body Blade 1H shoulder IR/ER  -KG      Reps 9 2  -KG      Time (Seconds) 9 30  -KG      Exercise 10    Exercise Name 10 Prone over Physioball YTI   -KG      Sets 10 2  -KG      Reps 10 10  -KG      Additional Comments 2# DB  -KG      Exercise 11    Exercise Name 11 Prone over pball; row + ER  -KG      Sets 11 1  -KG      Reps 11 10  -KG        User Key  (r) = Recorded By, (t) = Taken By, (c) = Cosigned By    Initials Name Provider Type    KG Erica N  ABRIL Negro Physical Therapist           Manual Rx (last 36 hours)      Manual Treatments       01/24/18 1400          Manual Rx 1    Manual Rx 1 Location R shoulder  -KG      Manual Rx 1 Type PROM flex, abd, ER, IR  -KG      Manual Rx 1 Duration 3 min; WFL  -KG        User Key  (r) = Recorded By, (t) = Taken By, (c) = Cosigned By    Initials Name Provider Type    KG Erica Negro PT Physical Therapist                      Outcome Measure Options: Quick DASH  Quick DASH  Open a tight or new jar.: Mild Difficulty  Do heavy household chores (e.g., wash walls, wash floors): Mild Difficulty  Carry a shopping bag or briefcase: No Difficulty  Wash your back: No Difficulty  Use a knife to cut food: No Difficulty  Recreational activities in which you take some force or impact through your arm, should or hand (e.g. golf, hammering, tennis, etc.): Mild Difficulty  During the past week, to what extent has your arm, shoulder, or hand problem interfered with your normal social activites with family, friends, neighbors or groups?: Slightly  During the past week, were you limited in your work or other regular daily activities as a result of your arm, shoulder or hand problem?: Slightly Limited  Arm, Shoulder, or hand pain: Mild  Tingling (pins and needles) in your arm, shoulder, or hand: None  During the past week, how much difficulty have you had sleeping because of the pain in your arm, shoulder or hand?: Mild Difficulty  Number of Questions Answered: 11  Quick DASH Score: 15.91         Time Calculation:   Start Time: 1430  Stop Time: 1532  Time Calculation (min): 62 min  Total Timed Code Minutes- PT: 47 minute(s)     Therapy Charges for Today     Code Description Service Date Service Provider Modifiers Qty    97875727578  PT THER PROC EA 15 MIN 1/24/2018 Erica Negro, PT GP 3    00876696691 HC PT THER SUPP EA 15 MIN 1/24/2018 Erica Negro, PT GP 1    43086107012  PT CARRY MOV HAND OBJ CURRENT 1/24/2018 Erica PEDRO  Marky, PT GP, CI 1    78444909196 HC PT CARRY MOV HAND OBJ PROJECTED 1/24/2018 Erica Negro, PT GP, CH 1    85916811728 HC PT THER PROC EA 15 MIN 1/24/2018 Erica Negro PT GP 3    26646236628 HC PT THER SUPP EA 15 MIN 1/24/2018 Erica Negro, PT GP 1          PT G-Codes  PT Professional Judgement Used?: Yes  Outcome Measure Options: Quick DASH  Score: 15.9%  Functional Limitation: Carrying, moving and handling objects  Carrying, Moving and Handling Objects Current Status (): At least 1 percent but less than 20 percent impaired, limited or restricted  Carrying, Moving and Handling Objects Goal Status (): 0 percent impaired, limited or restricted         Erica Negro, PT  1/24/2018

## 2018-01-30 ENCOUNTER — HOSPITAL ENCOUNTER (OUTPATIENT)
Dept: PHYSICAL THERAPY | Facility: HOSPITAL | Age: 68
Setting detail: THERAPIES SERIES
Discharge: HOME | End: 2018-01-30

## 2018-01-30 DIAGNOSIS — M75.21 BICEPS TENDINITIS OF RIGHT SHOULDER: ICD-10-CM

## 2018-01-30 DIAGNOSIS — M19.011 PRIMARY OSTEOARTHRITIS, RIGHT SHOULDER: ICD-10-CM

## 2018-01-30 DIAGNOSIS — S46.011A TRAUMATIC COMPLETE TEAR OF RIGHT ROTATOR CUFF, INITIAL ENCOUNTER: Primary | ICD-10-CM

## 2018-01-30 DIAGNOSIS — Z98.890 S/P ROTATOR CUFF REPAIR: ICD-10-CM

## 2018-01-30 PROCEDURE — 97140 MANUAL THERAPY 1/> REGIONS: CPT

## 2018-01-30 PROCEDURE — 97110 THERAPEUTIC EXERCISES: CPT

## 2018-01-30 NOTE — THERAPY TREATMENT NOTE
Outpatient Physical Therapy Ortho Treatment Note  [facility]     Patient Name: Emerson Sofia  : 1950  MRN: [id number]  Today's Date: 2018      Subjective Improvement: 80-85%       Attendance:   Approved:  Med necessity/medicare guidelines         MD follow up:  2018; will let us know date next visit         RC date:  18     Visit Date: 2018    Visit Dx:    ICD-10-CM ICD-9-CM   1. Traumatic complete tear of right rotator cuff, initial encounter S46.011A 840.4   2. S/P rotator cuff repair Z98.890 V45.89   3. Biceps tendinitis of right shoulder M75.21 726.12   4. Primary osteoarthritis, right shoulder M19.011 715.11       There is no problem list on file for this patient.       Past Medical History:   Diagnosis Date   • High cholesterol         Past Surgical History:   Procedure Laterality Date   • PACEMAKER IMPLANTATION     • ROTATOR CUFF REPAIR Right 10/2017             PT Ortho       18 1300    Subjective Comments    Subjective Comments Patient reports he bent down to  one of his [employer]'s toys and just threw his back out. States his next MD appointment is the end of  or ?  -NH    Precautions and Contraindications    Precautions See Protocol  -NH    Contraindications Pacemaker: No IFC  -NH    Subjective Pain    Pre-Treatment Pain Level 0  -NH    Post-Treatment Pain Level 0  -NH      User Key  (r) = Recorded By, (t) = Taken By, (c) = Cosigned By    Initials Name Provider Type    NH Susie Watson, PT Physical Therapist                            PT Assessment/Plan       18 1300       PT Assessment    Functional Limitations Decreased safety during functional activities;Limitation in home management;Limitations in community activities;Limitations in functional capacity and performance;Performance in leisure activities;Performance in self-care ADL;Performance in work activities  -NH     Impairments Joint mobility;Muscle  strength;Pain;Posture;Range of motion  -NH     Assessment Comments Patient fatigued with abd exercises especially body blade. Patient had increased shld hiking on R with PRE with 2# DB that improved with reduction to 1#.   -NH     Rehab Potential Good  -NH     Patient/caregiver participated in establishment of treatment plan and goals Yes  -NH     Patient would benefit from skilled therapy intervention Yes  -NH     PT Plan    PT Frequency 2x/week;1x/week  -NH     Predicted Duration of Therapy Intervention (days/wks) 3-4 more weeks  -NH     PT Plan Comments 2x/week this week and next and then reduce to 1x/week up until MD appointment.   -NH       User Key  (r) = Recorded By, (t) = Taken By, (c) = Cosigned By    Initials Name Provider Type    NH Susie Watson, PT Physical Therapist                Modalities       01/30/18 1300          Subjective Pain    Able to rate subjective pain? yes  -NH      Ice    Ice Applied Yes  -NH      Location  R shoulder  -NH      Rx Minutes 15 mins  -NH      Ice S/P Rx Yes  -NH        User Key  (r) = Recorded By, (t) = Taken By, (c) = Cosigned By    Initials Name Provider Type    NH Susie Watson, PT Physical Therapist                Exercises       01/30/18 1300          Subjective Comments    Subjective Comments Patient reports he bent down to  one of his grandkid's toys and just threw his back out. States his next MD appointment is the end of Feb 26 or 28?  -NH      Subjective Pain    Able to rate subjective pain? yes  -NH      Pre-Treatment Pain Level 0  -NH      Post-Treatment Pain Level 0  -NH      Aquatics    Aquatics performed? No  -NH      Exercise 1    Exercise Name 1 Pro II UE fwd/Retro  -NH      Time (Minutes) 1 10 minutes  -NH      Additional Comments L5.0  -NH      Exercise 2    Exercise Name 2 Cybex Rows  -NH      Sets 2 2  -NH      Reps 2 10  -NH      Additional Comments 50#  -NH      Exercise 3    Exercise Name 3 Cybex Incline pull  -NH      Sets 3 2  -NH       Reps 3 10  -NH      Additional Comments 50#  -NH      Exercise 4    Exercise Name 4 Cybex Rear Delt  -NH      Sets 4 2  -NH      Reps 4 10  -NH      Additional Comments 45#  -NH      Exercise 5    Exercise Name 5 Cybex Reverse Fly  -NH      Sets 5 2  -NH      Reps 5 10  -NH      Additional Comments 30#  -NH      Exercise 6    Exercise Name 6 Cybex Chest Press  -NH      Sets 6 2  -NH      Reps 6 10  -NH      Additional Comments 30#  -NH      Exercise 7    Exercise Name 7 Body Blade 2H   -NH      Reps 7 2  -NH      Time (Seconds) 7 30  -NH      Exercise 8    Exercise Name 8 Body Blade 1H Abd 45-90  -NH      Reps 8 2  -NH      Time (Seconds) 8 30  -NH      Exercise 9    Exercise Name 9 Body Blade 1H shoulder IR/ER  -NH      Reps 9 2  -NH      Time (Seconds) 9 30  -NH      Exercise 10    Exercise Name 10 Prone over Physioball YTI   -NH      Sets 10 --  -NH      Reps 10 --  -NH      Additional Comments Held due to back pain  -NH      Exercise 11    Exercise Name 11 Prone over pball; row + ER  -NH      Sets 11 --  -NH      Reps 11 --  -NH      Additional Comments Held due to back pain  -NH      Exercise 12    Exercise Name 12 PRE flex/scap/abd  -NH      Sets 12 2  -NH      Reps 12 10  -NH      Additional Comments 2# DB, reduced to 1# DB to finish set of abd  -NH        User Key  (r) = Recorded By, (t) = Taken By, (c) = Cosigned By    Initials Name Provider Type    NH Susie Watson, PT Physical Therapist                        Manual Rx (last 36 hours)      Manual Treatments       01/30/18 1300          Manual Rx 1    Manual Rx 1 Location R shoulder  -NH      Manual Rx 1 Type PROM flex, abd, ER, IR/ GH mobs Post/INF  -NH      Manual Rx 1 Duration 5 min   -NH      Manual Rx 2    Manual Rx 2 Location R shld  -NH      Manual Rx 2 Type Resisted PNF D2  -NH      Manual Rx 2 Duration 3 min  -NH        User Key  (r) = Recorded By, (t) = Taken By, (c) = Cosigned By    Initials Name Provider Type    NH Susie Watson, PT  Physical Therapist                PT OP Goals       01/30/18 1300       PT Short Term Goals    STG Date to Achieve --   All STG's met  -NH     STG 1 Patient will be IND and compliant with HEP  -NH     STG 1 Progress Met;Ongoing  -NH     STG 2 Patient will have PROM flexion to 160 degrees  -NH     STG 2 Progress Met  -NH     STG 3 Patient will have PROM abduction to 130 degrees  -NH     STG 3 Progress Met  -NH     STG 4 Patient will have PROM ER to 30 degrees  -NH     STG 4 Progress Met  -NH     STG 5 Patient will reduce Quick Dash to 40  -NH     STG 5 Progress Met  -NH     Long Term Goals    LTG Date to Achieve 02/14/18  -NH     LTG 1 Patient will reduce Quick Dash to 20  -NH     LTG 1 Progress Met  -NH     LTG 2 Patient will have R shld AROM flexion to 155 degrees  -NH     LTG 2 Progress Met  -NH     LTG 3 Patient will have R shld AROM abduction to 150 degrees  -NH     LTG 3 Progress Met  -NH     LTG 4 Patient will have active ER to 50 degrees measured at side  -NH     LTG 4 Progress Met  -NH     LTG 5 Patient will have IR functional reach to L4/L5  -NH     LTG 5 Progress Met  -NH     LTG 6 Patient will be able to tolerate 45 minute treatment session without increased pain on VAS  -NH     LTG 6 Progress Met  -NH     LTG 7 Demonstrate R shoulder flex/abd MMT to 4+/5 or greater  -NH     LTG 7 Progress New  -NH     LTG 8 Demonstrate R shoulder ER MMT to 4+/5 or greater  -NH     LTG 8 Progress New  -NH     LTG 9 Independent with final HEP/fitness  -NH     LTG 9 Progress New  -NH     LTG 10 9  -NH     Time Calculation    PT Goal Re-Cert Due Date 02/14/18  -NH       User Key  (r) = Recorded By, (t) = Taken By, (c) = Cosigned By    Initials Name Provider Type    NH Susie Watson, PT Physical Therapist          Therapy Education  Given: HEP, Symptoms/condition management  Program: Reinforced  How Provided: Verbal  Provided to: Patient  Level of Understanding: Verbalized, Demonstrated              Time Calculation:    Start Time: 1352  Stop Time: 1452  Time Calculation (min): 60 min  Total Timed Code Minutes- PT: 45 minute(s)    Therapy Charges for Today     Code Description Service Date Service Provider Modifiers Qty    87709022088 HC PT MANUAL THERAPY EA 15 MIN 1/30/2018 Susie Watson, PT GP, KX 1    60585673441 HC PT THER PROC EA 15 MIN 1/30/2018 Susie Watson, PT GP, KX 2    69126513186 HC PT THER SUPP EA 15 MIN 1/30/2018 Susie Watson, PT GP 1                    Susie Watson, PT  1/30/2018

## 2018-02-01 ENCOUNTER — HOSPITAL ENCOUNTER (OUTPATIENT)
Dept: PHYSICAL THERAPY | Facility: HOSPITAL | Age: 68
Setting detail: THERAPIES SERIES
Discharge: HOME | End: 2018-02-01

## 2018-02-01 DIAGNOSIS — Z98.890 S/P ROTATOR CUFF REPAIR: ICD-10-CM

## 2018-02-01 DIAGNOSIS — S46.011A TRAUMATIC COMPLETE TEAR OF RIGHT ROTATOR CUFF, INITIAL ENCOUNTER: Primary | ICD-10-CM

## 2018-02-01 PROCEDURE — 97150 GROUP THERAPEUTIC PROCEDURES: CPT | Performed by: PHYSICAL THERAPIST

## 2018-02-01 PROCEDURE — 97110 THERAPEUTIC EXERCISES: CPT | Performed by: PHYSICAL THERAPIST

## 2018-02-02 NOTE — THERAPY TREATMENT NOTE
Outpatient Physical Therapy Ortho Treatment Note  [facility]     Patient Name: Emerson Sofia  : 1950  MRN: [id number]  Today's Date: 2018      Visit Date: 2018     Subjective Improvement: 80-85%       Attendance:   Approved:  Med necessity/medicare guidelines         MD follow up: 18         date:  18     Visit Dx:    ICD-10-CM ICD-9-CM   1. Traumatic complete tear of right rotator cuff, initial encounter S46.011A 840.4   2. S/P rotator cuff repair Z98.890 V45.89       There is no problem list on file for this patient.       Past Medical History:   Diagnosis Date   • High cholesterol         Past Surgical History:   Procedure Laterality Date   • PACEMAKER IMPLANTATION     • ROTATOR CUFF REPAIR Right 10/2017             PT Ortho       18 1500    Precautions and Contraindications    Precautions See Protocol  -KG    Contraindications Pacemaker: No IFC  -KG    Posture/Observations    Posture/Observations Comments No acute distress.  -KG      18 1300    Subjective Comments    Subjective Comments Patient reports he bent down to  one of his grandLehod's toys and just threw his back out. States his next MD appointment is the end of  or ?  -NH    Precautions and Contraindications    Precautions See Protocol  -NH    Contraindications Pacemaker: No IFC  -NH    Subjective Pain    Pre-Treatment Pain Level 0  -NH    Post-Treatment Pain Level 0  -NH      User Key  (r) = Recorded By, (t) = Taken By, (c) = Cosigned By    Initials Name Provider Type     Erica Negro, PT Physical Therapist    NH Susie Watson, PT Physical Therapist                            PT Assessment/Plan       18 1500       PT Assessment    Functional Limitations Decreased safety during functional activities;Limitation in home management;Limitations in community activities;Limitations in functional capacity and performance;Performance in leisure activities;Performance  in self-care ADL;Performance in work activities  -KG     Impairments Joint mobility;Muscle strength;Pain;Posture;Range of motion  -KG     Assessment Comments Tolerated treatment well this date without increased pain. Continues to fatigue with body blade activities due to lack of muscle endurance. Prone activities performed on mat table vs. pball due to low back pain, which pt performed well with slight fatigue noted.  -KG     Rehab Potential Good  -KG     Patient/caregiver participated in establishment of treatment plan and goals Yes  -KG     Patient would benefit from skilled therapy intervention Yes  -KG     PT Plan    PT Frequency 2x/week;1x/week  -KG     Predicted Duration of Therapy Intervention (days/wks) 3-4 more weeks  -KG     PT Plan Comments Continue to progress RTC strengthening & scap stabilization  -KG       User Key  (r) = Recorded By, (t) = Taken By, (c) = Cosigned By    Initials Name Provider Type    THUY Negro, PT Physical Therapist                Modalities       02/01/18 1500          Subjective Pain    Post-Treatment Pain Level 0  -KG      Ice    Patient denies application of Ice Yes   Had another appointment  -KG      Patient reports will apply ice at home to involved area Yes  -KG        User Key  (r) = Recorded By, (t) = Taken By, (c) = Cosigned By    Initials Name Provider Type    THUY Negro, PT Physical Therapist                Exercises       02/01/18 1500          Subjective Comments    Subjective Comments Pt states his back is feeling better today after receiving shots, but still a little sore. States his shoulder is still doing well.   -KG      Subjective Pain    Able to rate subjective pain? yes  -KG      Pre-Treatment Pain Level 0  -KG      Post-Treatment Pain Level 0  -KG      Aquatics    Aquatics performed? No  -KG      Exercise 1    Exercise Name 1 Pro II UE fwd/Retro  -KG      Time (Minutes) 1 10 minutes  -KG      Exercise 2    Exercise Name 2 Cybex Rows  -KG       Sets 2 2  -KG      Reps 2 10  -KG      Additional Comments 50#  -KG      Exercise 3    Exercise Name 3 Cybex Incline pull  -KG      Sets 3 2  -KG      Reps 3 10  -KG      Additional Comments 30#  -KG      Exercise 4    Exercise Name 4 Cybex Rear Delt  -KG      Sets 4 2  -KG      Reps 4 10  -KG      Additional Comments 45#  -KG      Exercise 5    Exercise Name 5 Cybex Fly  -KG      Sets 5 2  -KG      Reps 5 10  -KG      Additional Comments 30#  -KG      Exercise 6    Exercise Name 6 Cybex Chest Press  -KG      Sets 6 2  -KG      Reps 6 10  -KG      Additional Comments 30#  -KG      Exercise 7    Exercise Name 7 Body Blade 2H   -KG      Reps 7 2  -KG      Time (Seconds) 7 30  -KG      Exercise 8    Exercise Name 8 Body Blade 1H shoulder IR/ER  -KG      Reps 8 2  -KG      Time (Seconds) 8 30  -KG      Exercise 9    Exercise Name 9 Prone Row  -KG      Reps 9 2  -KG      Time (Seconds) 9 30  -KG      Exercise 10    Exercise Name 10 Prone YTI  -KG      Sets 10 2  -KG      Reps 10 10  -KG      Additional Comments On table  -KG      Exercise 11    Exercise Name 11 Wall push up +  -KG      Sets 11 2  -KG      Reps 11 10  -KG      Exercise 12    Exercise Name 12 PRE flex/scap/abd  -KG      Sets 12 2  -KG      Reps 12 10  -KG      Additional Comments 2# DB flex/scap, 1# DB abd  -KG      Exercise 13    Exercise Name 13 Standing D2 PNF flex/ext  -KG      Sets 13 2  -KG      Reps 13 10  -KG      Additional Comments 1# DB  -KG        User Key  (r) = Recorded By, (t) = Taken By, (c) = Cosigned By    Initials Name Provider Type    KG Erica Negro, PT Physical Therapist                               PT OP Goals       02/01/18 1600 02/01/18 1500    PT Short Term Goals    STG Date to Achieve --  -KG --   All STG's met  -KG    STG 1 --  -KG Patient will be IND and compliant with HEP  -KG    STG 1 Progress --  -KG Met;Ongoing  -KG    STG 2 --  -KG Patient will have PROM flexion to 160 degrees  -KG    STG 2 Progress --  -KG Met   -KG    STG 3 --  -KG Patient will have PROM abduction to 130 degrees  -KG    STG 3 Progress --  -KG Met  -KG    STG 4 --  -KG Patient will have PROM ER to 30 degrees  -KG    STG 4 Progress --  -KG Met  -KG    STG 5 --  -KG Patient will reduce Quick Dash to 40  -KG    STG 5 Progress --  -KG Met  -KG    Long Term Goals    LTG Date to Achieve --  -KG 02/14/18  -KG    LTG 1 --  -KG Patient will reduce Quick Dash to 20  -KG    LTG 1 Progress --  -KG Met  -KG    LTG 2 --  -KG Patient will have R shld AROM flexion to 155 degrees  -KG    LTG 2 Progress --  -KG Met  -KG    LTG 3 --  -KG Patient will have R shld AROM abduction to 150 degrees  -KG    LTG 3 Progress --  -KG Met  -KG    LTG 4 --  -KG Patient will have active ER to 50 degrees measured at side  -KG    LTG 4 Progress --  -KG Met  -KG    LTG 5 --  -KG Patient will have IR functional reach to L4/L5  -KG    LTG 5 Progress --  -KG Met  -KG    LTG 6 --  -KG Patient will be able to tolerate 45 minute treatment session without increased pain on VAS  -KG    LTG 6 Progress --  -KG Met  -KG    LTG 7 --  -KG Demonstrate R shoulder flex/abd MMT to 4+/5 or greater  -KG    LTG 7 Progress --  -KG Progressing  -KG    LTG 8 --  -KG Demonstrate R shoulder ER MMT to 4+/5 or greater  -KG    LTG 8 Progress --  -KG Progressing  -KG    LTG 9 --  -KG Independent with final HEP/fitness  -KG    LTG 9 Progress --  -KG Progressing  -KG    LTG 10 --  -KG     Time Calculation    PT Goal Re-Cert Due Date  02/14/18  -KG      User Key  (r) = Recorded By, (t) = Taken By, (c) = Cosigned By    Initials Name Provider Type    KG Erica Negro, PT Physical Therapist          Therapy Education  Given: HEP, Symptoms/condition management, Posture/body mechanics  Program: Reinforced  How Provided: Verbal  Provided to: Patient  Level of Understanding: Verbalized, Demonstrated              Time Calculation:   Start Time: 1516  Stop Time: 1600  Time Calculation (min): 44 min  Total Timed Code Minutes- PT:  44 minute(s)    Therapy Charges for Today     Code Description Service Date Service Provider Modifiers Qty    19308365974 HC PT THER PROC EA 15 MIN 2/1/2018 Erica Negro, PT GP 2    93185382896 HC PT THER PROC GROUP 2/1/2018 Erica Negro, PT GP 1                    Erica Negro, PT  2/1/2018

## 2018-02-06 ENCOUNTER — HOSPITAL ENCOUNTER (OUTPATIENT)
Dept: PHYSICAL THERAPY | Facility: HOSPITAL | Age: 68
Setting detail: THERAPIES SERIES
Discharge: HOME | End: 2018-02-06

## 2018-02-06 DIAGNOSIS — Z98.890 S/P ROTATOR CUFF REPAIR: ICD-10-CM

## 2018-02-06 DIAGNOSIS — S46.011A TRAUMATIC COMPLETE TEAR OF RIGHT ROTATOR CUFF, INITIAL ENCOUNTER: Primary | ICD-10-CM

## 2018-02-06 PROCEDURE — 97110 THERAPEUTIC EXERCISES: CPT

## 2018-02-06 NOTE — THERAPY TREATMENT NOTE
"    Outpatient Physical Therapy Ortho Treatment Note  [facility]  Julia Reyes PTA  18  2:34 PM       Patient Name: Emerson Sofia  : 1950  MRN: [id number]  Today's Date: 2018      Visit Date: 2018    Subjective Improvement: 80-85%       Attendance:    Approved: med necessity/ medicare          MD follow up: 18           RC date: 18         Visit Dx:    ICD-10-CM ICD-9-CM   1. Traumatic complete tear of right rotator cuff, initial encounter S46.011A 840.4   2. S/P rotator cuff repair Z98.890 V45.89       There is no problem list on file for this patient.       Past Medical History:   Diagnosis Date   • High cholesterol         Past Surgical History:   Procedure Laterality Date   • PACEMAKER IMPLANTATION     • ROTATOR CUFF REPAIR Right 10/2017             PT Ortho       18 1300    Subjective Comments    Subjective Comments pt states that his back \"went out\" again- states he is going to get another injection this afternoon. No pain in the shoulder  -    Precautions and Contraindications    Precautions See Protocol  -    Contraindications Pacemaker: No IFC  -ARA    Subjective Pain    Able to rate subjective pain? yes  -ARA    Pre-Treatment Pain Level 0  -ARA    Post-Treatment Pain Level 0  -ARA      User Key  (r) = Recorded By, (t) = Taken By, (c) = Cosigned By    Initials Name Provider Type     Julia Reyes PTA Physical Therapy Assistant                            PT Assessment/Plan       18 1300       PT Assessment    Functional Limitations Decreased safety during functional activities;Limitation in home management;Limitations in community activities;Limitations in functional capacity and performance;Performance in leisure activities;Performance in self-care ADL;Performance in work activities  -     Impairments Joint mobility;Muscle strength;Pain;Posture;Range of motion  -     Assessment Comments Pt able to complete all therex with " "no pain reported. pt did fatigue with PNF diagnols. Held prone therex due to pts back hurting  -ARA     Rehab Potential Good  -ARA     Patient/caregiver participated in establishment of treatment plan and goals Yes  -ARA     Patient would benefit from skilled therapy intervention Yes  -ARA     PT Plan    PT Frequency 1x/week;2x/week  -ARA     Predicted Duration of Therapy Intervention (days/wks) 3-4 more weeks  -ARA     PT Plan Comments 1x per week until MD appt on 2/28/18 per 1° PT  -ARA       User Key  (r) = Recorded By, (t) = Taken By, (c) = Cosigned By    Initials Name Provider Type    ARA Reyes PTA Physical Therapy Assistant                Modalities       02/06/18 1300          Ice    Ice Applied Yes  -ARA      Location  R shoulder  -ARA      Rx Minutes 15 mins  -ARA      Ice S/P Rx Yes  -ARA        User Key  (r) = Recorded By, (t) = Taken By, (c) = Cosigned By    Initials Name Provider Type    ARA Reyes PTA Physical Therapy Assistant                Exercises       02/06/18 1300          Subjective Comments    Subjective Comments pt states that his back \"went out\" again- states he is going to get another injection this afternoon. No pain in the shoulder  -ARA      Subjective Pain    Able to rate subjective pain? yes  -ARA      Pre-Treatment Pain Level 0  -ARA      Post-Treatment Pain Level 0  -ARA      Aquatics    Aquatics performed? No  -ARA      Exercise 1    Exercise Name 1 Pro II UE fwd/Retro  -ARA      Time (Minutes) 1 10 minutes  -ARA      Additional Comments L5.0  -ARA      Exercise 2    Exercise Name 2 Cybex Rows  -ARA      Sets 2 2  -ARA      Reps 2 10  -ARA      Exercise 3    Exercise Name 3 Cybex Incline pull  -ARA      Sets 3 2  -ARA      Reps 3 10  -ARA      Exercise 4    Exercise Name 4 Cybex Rear Delt  -ARA      Sets 4 2  -ARA      Reps 4 10  -ARA      Exercise 5    Exercise Name 5 Cybex Fly  -ARA      Sets 5 2  -ARA      Reps 5 10  -ARA      Exercise 6    Exercise Name 6 Cybex Chest Press  -ARA      " Sets 6 2  -ARA      Reps 6 10  -ARA      Exercise 7    Exercise Name 7 Body Blade 2H   -ARA      Reps 7 2  -ARA      Time (Seconds) 7 30  -ARA      Exercise 8    Exercise Name 8 Body Blade 1H shoulder IR/ER  -ARA      Reps 8 2  -ARA      Time (Seconds) 8 30  -ARA      Exercise 9    Exercise Name 9 Prone Row  -ARA      Reps 9 2  -ARA      Time (Seconds) 9 30  -ARA      Additional Comments hold due to back pain  -ARA      Exercise 10    Exercise Name 10 Prone YTI  -ARA      Sets 10 2  -ARA      Reps 10 10  -ARA      Additional Comments hold due to back pain  -ARA      Exercise 11    Exercise Name 11 Wall push up +  -ARA      Sets 11 2  -ARA      Reps 11 15  -ARA      Exercise 12    Exercise Name 12 PRE flex/scap/abd  -ARA      Sets 12 2  -ARA      Reps 12 10  -ARA      Exercise 13    Exercise Name 13 Standing D2 PNF flex/ext  -ARA      Sets 13 2  -ARA      Reps 13 10  -ARA        User Key  (r) = Recorded By, (t) = Taken By, (c) = Cosigned By    Initials Name Provider Type    ARA Reyes PTA Physical Therapy Assistant                        Manual Rx (last 36 hours)      Manual Treatments       02/06/18 1300          Manual Rx 1    Manual Rx 1 Location R shoulder  -ARA      Manual Rx 1 Type PROM flex, abd, ER, IR/ GH mobs Post/INF  -ARA      Manual Rx 1 Duration 6 min  -ARA        User Key  (r) = Recorded By, (t) = Taken By, (c) = Cosigned By    Initials Name Provider Type    ARA Reyes PTA Physical Therapy Assistant                PT OP Goals       02/06/18 1400       PT Short Term Goals    STG Date to Achieve --   All STG's met  -     STG 1 Patient will be IND and compliant with [facility]  -     STG 1 Progress Met;Ongoing  -     STG 2 Patient will have PROM flexion to 160 degrees  -     STG 2 Progress Met  -     STG 3 Patient will have PROM abduction to 130 degrees  -     STG 3 Progress Met  -     STG 4 Patient will have PROM ER to 30 degrees  -     STG 4 Progress Met  -     STG 5 Patient will reduce  Quick Dash to 40  -     STG 5 Progress Met  [facility]     Long Term Goals    LTG Date to Achieve 02/14/18  -     LTG 1 Patient will reduce Quick Dash to 20  -ARA     LTG 1 Progress Met  -     LTG 2 Patient will have R shld AROM flexion to 155 degrees  -     LTG 2 Progress Met  -     LTG 3 Patient will have R shld AROM abduction to 150 degrees  -     LTG 3 Progress Met  -     LTG 4 Patient will have active ER to 50 degrees measured at side  -     LTG 4 Progress Met  -     LTG 5 Patient will have IR functional reach to L4/L5  -     LTG 5 Progress Met  -     LTG 6 Patient will be able to tolerate 45 minute treatment session without increased pain on VAS  -     LTG 6 Progress Met  -     LTG 7 Demonstrate R shoulder flex/abd MMT to 4+/5 or greater  -     LTG 7 Progress Progressing  -     LTG 8 Demonstrate R shoulder ER MMT to 4+/5 or greater  -     LTG 8 Progress Progressing  -     LTG 9 Independent with final HEP/fitness  -     LT 9 Progress Progressing  -     Time Calculation    PT Goal Re-Cert Due Date 02/14/18  -       User Key  (r) = Recorded By, (t) = Taken By, (c) = Cosigned By    Initials Name Provider Type     Julia Reyes PTA Physical Therapy Assistant          Therapy Education  Given: HEP, Symptoms/condition management, Posture/body mechanics  Program: Reinforced  How Provided: Verbal  Provided to: Patient  Level of Understanding: Verbalized, Demonstrated              Time Calculation:   Start Time: 1345  Stop Time: 1440  Time Calculation (min): 55 min  Total Timed Code Minutes- PT: 40 minute(s)    Therapy Charges for Today     Code Description Service Date Service Provider Modifiers Qty    16175636296  PT THER PROC EA 15 MIN 2/6/2018 Julia Reyes PTA GP 3    19391116036 HC PT THER SUPP EA 15 MIN 2/6/2018 Julia Reyes PTA GP 1                    Julia Reyes PTA  2/6/2018

## 2018-02-08 ENCOUNTER — APPOINTMENT (OUTPATIENT)
Dept: PHYSICAL THERAPY | Facility: HOSPITAL | Age: 68
End: 2018-02-08

## 2018-02-13 ENCOUNTER — HOSPITAL ENCOUNTER (OUTPATIENT)
Dept: PHYSICAL THERAPY | Facility: HOSPITAL | Age: 68
Setting detail: THERAPIES SERIES
Discharge: HOME | End: 2018-02-13

## 2018-02-13 DIAGNOSIS — M19.011 PRIMARY OSTEOARTHRITIS, RIGHT SHOULDER: ICD-10-CM

## 2018-02-13 DIAGNOSIS — S46.011A TRAUMATIC COMPLETE TEAR OF RIGHT ROTATOR CUFF, INITIAL ENCOUNTER: Primary | ICD-10-CM

## 2018-02-13 DIAGNOSIS — M75.21 BICEPS TENDINITIS OF RIGHT SHOULDER: ICD-10-CM

## 2018-02-13 DIAGNOSIS — Z98.890 S/P ROTATOR CUFF REPAIR: ICD-10-CM

## 2018-02-13 PROCEDURE — G8985 CARRY GOAL STATUS: HCPCS

## 2018-02-13 PROCEDURE — G8986 CARRY D/C STATUS: HCPCS

## 2018-02-13 PROCEDURE — G8984 CARRY CURRENT STATUS: HCPCS

## 2018-02-13 PROCEDURE — 97110 THERAPEUTIC EXERCISES: CPT

## 2018-02-13 NOTE — THERAPY DISCHARGE NOTE
Outpatient Physical Therapy Ortho Discharge  [facility]     Patient Name: Emerson Sofia  : 1950  MRN: [id number]  Today's Date: 2018      Subjective Improvement: 80-85%       Attendance:   Approved: med sebastián/ medicare          MD follow up: 18             Visit Date: 2018    There is no problem list on file for this patient.       Past Medical History:   Diagnosis Date   • High cholesterol         Past Surgical History:   Procedure Laterality Date   • PACEMAKER IMPLANTATION     • ROTATOR CUFF REPAIR Right 10/2017         Visit Dx:     ICD-10-CM ICD-9-CM   1. Traumatic complete tear of right rotator cuff, initial encounter S46.011A 840.4   2. S/P rotator cuff repair Z98.890 V45.89   3. Biceps tendinitis of right shoulder M75.21 726.12   4. Primary osteoarthritis, right shoulder M19.011 715.11                 PT Ortho       18 1200    Precautions and Contraindications    Precautions See Protocol  -NH    Contraindications Pacemaker: No IFC  -NH    Subjective Pain    Able to rate subjective pain? yes  -NH    Right Shoulder    Flexion AROM Deficit 166  -NH    ABduction AROM Deficit 166  -NH    External Rotation AROM Deficit 70 arm at side; T3 functional reach  -NH    Internal Rotation AROM Deficit T12 functional reach  -NH    Right Shoulder    Flexion Gross Movement (4/5) good  -NH    ABduction Gross Movement (4/5) good  -NH    Int Rotation Gross Movement (4+/5) good plus  -NH    Ext Rotation Gross Movement (4/5) good  -NH      User Key  (r) = Recorded By, (t) = Taken By, (c) = Cosigned By    Initials Name Provider Type    NH Susie Watson, ABRIL Physical Therapist                       Therapy Education  Given: HEP, Symptoms/condition management, Posture/body mechanics  Program: Reinforced  How Provided: Verbal  Provided to: Patient  Level of Understanding: Verbalized, Demonstrated          PT OP Goals       18 1200       PT Short Term Goals    STG Date  to Achieve --   All STG's met  -NH     STG 1 Patient will be IND and compliant with HEP  -NH     STG 1 Progress Met  -NH     STG 2 Patient will have PROM flexion to 160 degrees  -NH     STG 2 Progress Met  -NH     STG 3 Patient will have PROM abduction to 130 degrees  -NH     STG 3 Progress Met  -NH     STG 4 Patient will have PROM ER to 30 degrees  -NH     STG 4 Progress Met  -NH     STG 5 Patient will reduce Quick Dash to 40  -NH     STG 5 Progress Met  -NH     Long Term Goals    LTG Date to Achieve 02/14/18  -NH     LTG 1 Patient will reduce Quick Dash to 20  -NH     LTG 1 Progress Met  -NH     LTG 2 Patient will have R shld AROM flexion to 155 degrees  -NH     LTG 2 Progress Met  -NH     LTG 3 Patient will have R shld AROM abduction to 150 degrees  -NH     LTG 3 Progress Met  -NH     LTG 4 Patient will have active ER to 50 degrees measured at side  -NH     LTG 4 Progress Met  -NH     LTG 5 Patient will have IR functional reach to L4/L5  -NH     LTG 5 Progress Met  -NH     LTG 6 Patient will be able to tolerate 45 minute treatment session without increased pain on VAS  -NH     LTG 6 Progress Met  -NH     LTG 7 Demonstrate R shoulder flex/abd MMT to 4+/5 or greater  -NH     LTG 7 Progress Not Met;Progressing  -NH     LTG 8 Demonstrate R shoulder ER MMT to 4+/5 or greater  -NH     LTG 8 Progress Partially Met  -NH     LTG 9 Independent with final HEP/fitness  -NH     LTG 9 Progress Met  -NH     Time Calculation    PT Goal Re-Cert Due Date --  -NH       User Key  (r) = Recorded By, (t) = Taken By, (c) = Cosigned By    Initials Name Provider Type    NH Susie Watson, PT Physical Therapist                PT Assessment/Plan       02/13/18 1200       PT Assessment    Functional Limitations --  -NH     Impairments --  -NH     Assessment Comments Patient demonstrates excellent progress in physical therapy s/p R RTC repair. Patient has AROM and PROM WFL. Patient demonstrates improvements in RTC strength and global shld  strength. Patient has progressed to the point of IND home program and participation in 12 month free fitness membership. Patient is able to demonstrate proper return on exercises and has minimal shld hiking compensation. Patient has been discharged from physical therapy at this time with f/u with MD scheduled 2/28/18.  -NH     Rehab Potential Good  -NH     Patient/caregiver participated in establishment of treatment plan and goals Yes  -NH     Patient would benefit from skilled therapy intervention --  -NH     PT Plan    PT Frequency --  -NH     Predicted Duration of Therapy Intervention (days/wks) --  -NH     PT Plan Comments Discharged to [facility]  -NH       User Key  (r) = Recorded By, (t) = Taken By, (c) = Cosigned By    Initials Name Provider Type    NH Susie Watson, PT Physical Therapist                Exercises       02/13/18 1200          Subjective Comments    Subjective Comments Patient reports he still has issues if he holds items for prolonged period of time or OH movements a lot. Patient also has pain when trying to sleep on that side at night.   -NH      Subjective Pain    Able to rate subjective pain? yes  -NH      Pre-Treatment Pain Level 0  -NH      Post-Treatment Pain Level 0  -NH      Aquatics    Aquatics performed? No  -NH      Exercise 1    Exercise Name 1 Pro II UE fwd/Retro  -NH      Time (Minutes) 1 10 minutes  -NH      Additional Comments L5.0  -NH      Exercise 2    Exercise Name 2 Cybex Rows  -NH      Sets 2 2  -NH      Reps 2 10  -NH      Additional Comments 50#  -NH      Exercise 3    Exercise Name 3 Cybex Incline pull  -NH      Sets 3 2  -NH      Reps 3 10  -NH      Additional Comments 50#  -NH      Exercise 4    Exercise Name 4 Cybex Rear Delt  -NH      Sets 4 2  -NH      Reps 4 10  -NH      Additional Comments 45#  -NH      Exercise 5    Exercise Name 5 Cybex Fly  -NH      Sets 5 2  -NH      Reps 5 10  -NH      Additional Comments 30#  -NH      Exercise 6    Exercise Name 6 Cybex Chest  Press  -NH      Sets 6 2  -NH      Reps 6 10  -NH      Additional Comments 30#  -NH      Exercise 7    Exercise Name 7 Body Blade 2H   -NH      Reps 7 2  -NH      Time (Seconds) 7 30  -NH      Exercise 8    Exercise Name 8 Body Blade 1H shoulder IR/ER  -NH      Reps 8 2  -NH      Time (Seconds) 8 30  -NH      Exercise 9    Exercise Name 9 Shoulder Press  -NH      Sets 9 2  -NH      Reps 9 10  -NH      Time (Seconds) 9 --  -NH      Additional Comments 2# DB first set and 1# second set  -NH      Exercise 10    Exercise Name 10 --  -NH      Sets 10 --  -NH      Reps 10 --  -NH      Exercise 11    Exercise Name 11 Inclined mat table push up +  -NH      Sets 11 2  -NH      Reps 11 15  -NH      Exercise 12    Exercise Name 12 PRE flex/scap/abd  -NH      Sets 12 2  -NH      Reps 12 10  -NH      Additional Comments 2# DB both sets  -NH      Exercise 13    Exercise Name 13 Standing D2 PNF flex/ext  -NH      Sets 13 2  -NH      Reps 13 10  -NH      Additional Comments 1# DB  -NH        User Key  (r) = Recorded By, (t) = Taken By, (c) = Cosigned By    Initials Name Provider Type    NH Susie Watson, PT Physical Therapist           Manual Rx (last 36 hours)      Manual Treatments       02/13/18 1100          Manual Rx 1    Manual Rx 1 Location R shoulder  -NH      Manual Rx 1 Type PROM flex, abd, ER, IR/ GH mobs Post/INF  -NH      Manual Rx 1 Duration 6 min  -NH        User Key  (r) = Recorded By, (t) = Taken By, (c) = Cosigned By    Initials Name Provider Type    NH Susie Watson, PT Physical Therapist                     Outcome Measure Options: Quick DASH  Quick DASH  Open a tight or new jar.: No Difficulty  Do heavy household chores (e.g., wash walls, wash floors): No Difficulty  Carry a shopping bag or briefcase: Mild Difficulty  Wash your back: No Difficulty  Use a knife to cut food: No Difficulty  Recreational activities in which you take some force or impact through your arm, should or hand (e.g. golf,  hammering, tennis, etc.): No Difficulty  During the past week, to what extent has your arm, shoulder, or hand problem interfered with your normal social activites with family, friends, neighbors or groups?: Not at all  During the past week, were you limited in your work or other regular daily activities as a result of your arm, shoulder or hand problem?: Slightly Limited  Arm, Shoulder, or hand pain: Mild  Tingling (pins and needles) in your arm, shoulder, or hand: None  During the past week, how much difficulty have you had sleeping because of the pain in your arm, shoulder or hand?: No difficulty  Number of Questions Answered: 11  Quick DASH Score: 6.82         Time Calculation:   Start Time: 1257  Stop Time: 1345  Time Calculation (min): 48 min  Total Timed Code Minutes- PT: 48 minute(s)    Therapy Charges for Today     Code Description Service Date Service Provider Modifiers Qty    63992230376 HC PT CARRY MOV HAND OBJ CURRENT 2/13/2018 Susie Watson, PT GP, [place] 1    23052661996 HC PT CARRY MOV HAND OBJ PROJECTED 2/13/2018 Susie Watson, PT GP, [place] 1    91029096737 HC PT CARRY MOV HAND OBJ DISCHARGE 2/13/2018 Susie Watson, PT GP, [place] 1    54387276089 HC PT THER PROC EA 15 MIN 2/13/2018 Susie Watson, PT GP, K[place] 3          PT G-Codes  PT Professional Judgement Used?: Yes  Outcome Measure Options: Quick DASH  Score: 6.82  Functional Limitation: Carrying, moving and handling objects  Carrying, Moving and Handling Objects Current Status (): 0 percent impaired, limited or restricted  Carrying, Moving and Handling Objects Goal Status (): 0 percent impaired, limited or restricted  Carrying, Moving and Handling Objects Discharge Status (): 0 percent impaired, limited or restricted     OP PT Discharge Summary  Date of Discharge: 02/13/18  Reason for Discharge: Maximum functional potential achieved  Outcomes Achieved: Patient able to partially acheive established goals  Discharge Destination: Home with  home program  Discharge Instructions: Discharged to [facility] with 1 month free fitness membership and MD appointment scheduled for 2/28/18        Susie Watson, PT  2/13/2018

## 2018-10-03 ENCOUNTER — TRANSCRIBE ORDERS (OUTPATIENT)
Dept: PHYSICAL THERAPY | Facility: HOSPITAL | Age: 68
End: 2018-10-03

## 2018-10-03 DIAGNOSIS — M67.952 TENDINOPATHY OF LEFT GLUTEUS MEDIUS: Primary | ICD-10-CM

## 2018-10-10 ENCOUNTER — HOSPITAL ENCOUNTER (OUTPATIENT)
Dept: PHYSICAL THERAPY | Facility: HOSPITAL | Age: 68
Setting detail: THERAPIES SERIES
Discharge: HOME | End: 2018-10-10

## 2018-10-10 DIAGNOSIS — M67.952 TENDINOPATHY OF LEFT GLUTEUS MEDIUS: Primary | ICD-10-CM

## 2018-10-10 PROCEDURE — G8978 MOBILITY CURRENT STATUS: HCPCS | Performed by: PHYSICAL THERAPIST

## 2018-10-10 PROCEDURE — 97110 THERAPEUTIC EXERCISES: CPT | Performed by: PHYSICAL THERAPIST

## 2018-10-10 PROCEDURE — 97161 PT EVAL LOW COMPLEX 20 MIN: CPT | Performed by: PHYSICAL THERAPIST

## 2018-10-10 PROCEDURE — G8979 MOBILITY GOAL STATUS: HCPCS | Performed by: PHYSICAL THERAPIST

## 2018-10-11 NOTE — THERAPY EVALUATION
Outpatient Physical Therapy Ortho Initial Evaluation  [facility]     Patient Name: Emerson Sofia  : 1950  MRN: [id number]  Today's Date: 10/10/2018      Visit Date: 10/10/2018    There is no problem list on file for this patient.       Past Medical History:   Diagnosis Date   • High cholesterol         Past Surgical History:   Procedure Laterality Date   • PACEMAKER IMPLANTATION     • ROTATOR CUFF REPAIR Right 10/2017     Allergies   Allergen Reactions   • Penicillins      Medications:  Naproxen  Plavix  Lipitor  Allopurinol  Omega 3  Low dose aspirin  Propranolol      Visit Dx:     ICD-10-CM ICD-9-CM   1. Tendinopathy of left gluteus medius M67.952 727.9             Patient History     Row Name 10/10/18 0800             History    Chief Complaint Pain  -KG      Type of Pain Hip pain;Lower Extremity / Leg  -KG      Date Current Problem(s) Began --   ~ 5 months ago  -KG      Brief Description of Current Complaint Pt went bowling, which he does often, and when getting ready to release the ball he slipped and thought pulled his groin. Tried one more time to bowl and couldn't do it due to pain. Went to family MD was told just to rest for a few weeks. Pt states he can't lift anything heavy or it will give way. Can't run. Walking difficult at times and sometimes walks with a limp. States most of his pain is in his groin area. States when he squats however, feels like the back of his leg/buttock is being cut with knife. Pain wraps around groin area and behind thigh. Can't have MRI due to pacemaker. Went to ortho MD and they suspect he tore one of the muscles in his hip.  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 Walking at times, running, lifting  -KG      What Performance Factors Make the Current Problem(s) BETTER? Muscle creams, ice, rest  -KG      Is your sleep disturbed? No  -KG      Difficulties at work? N/A  -KG      Difficulties with ADL's? Independent  -KG      Difficulties with recreational activities? Has increased pain at times with leisure activities.  -KG        User Key  (r) = Recorded By, (t) = Taken By, (c) = Cosigned By    Initials Name Provider Type    KG Erica Negro, PT Physical Therapist                PT Ortho     Row Name 10/10/18 0900       Subjective Comments    Subjective Comments Refer to therapy pt history for details.  -KG       Precautions and Contraindications    Precautions/Limitations pacemaker  -KG    Precautions Blood thinner use  -KG       Subjective Pain    Able to rate subjective pain? yes  -KG    Pre-Treatment Pain Level 1  -KG    Post-Treatment Pain Level 1  -KG       Posture/Observations    Posture/Observations Comments No acute distress. Ambulates with non-antalgic gait with no AD. Pain at anterior L hip and distal glutes insertion with squatting ~60 deg.  -KG       Special Tests/Palpation    Special Tests/Palpation Hip  -KG       Hip/Thigh Palpation    Hip/Thigh Palpation? Yes  -KG    Iliopsoas Left:;Tender;Guarded/taut  -KG    Gluteus Medius Left:;Tender  -KG    Gluteus Minimus Left:;Tender  -KG    Biceps Formoris Left:;Tender;Guarded/taut  -KG       Hip Special Tests    OKSANA (hip vs SI pathology) Bilateral:;Negative  -KG    Hip scour test (labral vs hip pathology) Bilateral:;Negative  -KG       General ROM    RT Lower Ext Rt Hip Flexion;Rt Hip External Rotation;Rt Hip Internal Rotation  -KG    LT Lower Ext Lt Hip Flexion;Lt Hip External Rotation;Lt Hip Internal Rotation   "-KG       Right Lower Ext    Rt Hip Flexion AROM 118  -KG    Rt Hip External Rotation AROM 25  -KG    Rt Hip Internal Rotation AROM 25  -KG       Left Lower Ext    Lt Hip Flexion AROM 98; pain at anterior hip  -KG    Lt Hip External Rotation AROM 20  -KG    Lt Hip Internal Rotation AROM 22  -KG    LT Lower Extremity Comments Pain with passive hip ER, IR, & flex  -KG       MMT (Manual Muscle Testing)    Rt Lower Ext Rt Hip Flexion;Rt Hip Extension;Rt Hip ABduction;Rt Hip ADduction;Rt Knee Extension;Rt Knee Flexion  -KG    Lt Lower Ext Lt Hip Flexion;Lt Hip Extension;Lt Hip ABduction;Lt Hip ADduction;Lt Knee Extension;Lt Knee Flexion  -KG       MMT Right Lower Ext    Rt Hip Flexion MMT, Gross Movement (5/5) normal  -KG    Rt Hip Extension MMT, Gross Movement (5/5) normal  -KG    Rt Hip ABduction MMT, Gross Movement (5/5) normal  -KG    Rt Hip ADduction MMT, Gross Movement (5/5) normal  -KG    Rt Knee Extension MMT, Gross Movement (5/5) normal  -KG    Rt Knee Flexion MMT, Gross Movement (5/5) normal  -KG       MMT Left Lower Ext    Lt Hip Flexion MMT, Gross Movement (4+/5) good plus  -KG    Lt Hip Extension MMT, Gross Movement (4/5) good  -KG    Lt Hip ABduction MMT, Gross Movement (4/5) good  -KG    Lt Hip ADduction MMT, Gross Movement (4/5) good   L groin pain/pull  -KG    Lt Knee Extension MMT, Gross Movement (5/5) normal  -KG    Lt Knee Flexion MMT, Gross Movement (5/5) normal  -KG       Sensation    Sensation WNL? WNL  -KG    Light Touch No apparent deficits  -KG       Flexibility    Flexibility Tested? Lower Extremity  -KG       Lower Extremity Flexibility    Hip Flexors Left:;Mildly limited  -KG    Quadriceps Left:;Moderately limited  -KG    Hip External Rotators Left:;Mildly limited  -KG    Hip Internal Rotators Left:;Mildly limited  -KG       Balance Skills Training    SLS L/R single stance 20\" bilaterally; no significant compensations noted  -KG      User Key  (r) = Recorded By, (t) = Taken By, (c) = Cosigned " By    Initials Name Provider Type    Erica Jenkins, PT Physical Therapist          Therapy Education  Education Details: POC education. Anatomy education related to condition. HEP: flex/abd/ext SLR, piriformis stretch, seated HS stretch, SL clamshells  Given: HEP;Symptoms/condition management;Pain management;Posture/body mechanics  Program: New  How Provided: Verbal, Demonstration, Written  Provided to: Patient  Level of Understanding: Teach back education performed, Verbalized, Demonstrated             PT OP Goals     Row Name 10/10/18 0800          PT Short Term Goals    STG Date to Achieve 10/31/18  -KG     STG 1 Independent/compliant with HEP  -KG     STG 1 Progress New  -KG     STG 2 Tolerate 45 minute treatment session without increased pain  -KG     STG 2 Progress New  -KG     STG 3 Demonstrate L hip flex/abd MMT to 4+/5  -KG     STG 3 Progress New  -KG     STG 4 Demonstrate pain free passive L hip ER/IR AROM  -KG     STG 4 Progress New  -KG        Long Term Goals    LTG Date to Achieve 11/14/18  -KG     LTG 1 Subjectively report 60% improvement or greater  -KG     LTG 1 Progress New  -KG     LTG 2 Improve LEFS score to 55/80 or greater  -KG     LTG 2 Progress New  -KG     LTG 3 Demonstrate L hip hip flex/abd MMT to 5/5  -KG     LTG 3 Progress New  -KG     LTG 4 Demonstrate L hip ext MMT to 5/5  -KG     LTG 4 Progress New  -KG     LTG 5 Demonstrate L hip flex AROM to 115 deg wihtout increased pain  -KG     LTG 5 Progress New  -KG     LTG 6 Demonstrate ability to perform proper squat from 70-90 deg without increased pain  -KG     LTG 6 Progress New  -KG        Time Calculation    PT Goal Re-Cert Due Date 09/19/18  -KG       User Key  (r) = Recorded By, (t) = Taken By, (c) = Cosigned By    Initials Name Provider Type    Erica Jenkins, PT Physical Therapist        PT Assessment/Plan     Row Name 10/10/18 0800          PT Assessment    Functional Limitations Impaired gait;Impaired  locomotion;Limitation in home management;Limitations in community activities;Performance in leisure activities  -KG     Impairments Gait;Impaired flexibility;Impaired muscle endurance;Muscle strength;Pain;Range of motion  -KG     Assessment Comments Pt is a 68 y.o. male presenting with L anterior hip pain after an injury ~5 months ago. Pt with notable weakness overall in hip, angela in glutes and hip flexors. Pt very TTP at proximal quad/iliopsoas muscles. Slight limitations noted in L hip flex & ER/IR AROM with pain reported with passive ROM. Pt unable to undergo MRI due to pacemaker. Pt will benefit from skilled PT services to address these deficits for improvements in overall functional strength/ROM, flexbility, and tolerance to daily functional activities.   -KG     Please refer to paper survey for additional self-reported information Yes  -KG     Rehab Potential Good  -KG     Patient/caregiver participated in establishment of treatment plan and goals Yes  -KG     Patient would benefit from skilled therapy intervention Yes  -KG        PT Plan    PT Frequency 2x/week  -KG     Predicted Duration of Therapy Intervention (Therapy Eval) 4-6 weeks  -KG     Planned CPT's? PT EVAL LOW COMPLEXITY: 95276;PT RE-EVAL: 48434;PT THER PROC EA 15 MIN: 55735;PT THER ACT EA 15 MIN: 43576;PT MANUAL THERAPY EA 15 MIN: 90417;PT NEUROMUSC RE-EDUCATION EA 15 MIN: 49188;PT GAIT TRAINING EA 15 MIN: 46240;PT AQUATIC THERAPY EA 15 MIN: 00698;PT SELF CARE/HOME MGMT/TRAIN EA 15: 76911;PT THER SUPP EA 15 MIN  -KG     Physical Therapy Interventions (Optional Details) home exercise program;joint mobilization;lumbar stabilization;manual therapy techniques;modalities;neuromuscular re-education;patient/family education;ROM (Range of Motion);strengthening;stretching  -KG     PT Plan Comments Focus on overall hip stretching (quads, hip flexors, hip external rotoators) and strengthening, especially in glutes & hip flexors. Manual STM/MFR prn,  "modalities prn for pain (no IFC).  -KG       User Key  (r) = Recorded By, (t) = Taken By, (c) = Cosigned By    Initials Name Provider Type    Erica Jenkins PT Physical Therapist                     Exercises     Row Name 10/10/18 0900             Precautions    Existing Precautions/Restrictions pacemaker  -KG         Subjective Comments    Subjective Comments Refer to therapy pt history for details.  -KG         Subjective Pain    Able to rate subjective pain? yes  -KG      Pre-Treatment Pain Level 1  -KG      Post-Treatment Pain Level 1  -KG         Aquatics    Aquatics performed? No  -KG        User Key  (r) = Recorded By, (t) = Taken By, (c) = Cosigned By    Initials Name Provider Type    Erica Jenkins PT Physical Therapist         Supine SLR flex: 1x15  SL hip abd SLR: 1x15  SL clamshells: 1x15  Prone hip ext: 1x15  Seated piriformis stretch; push knee down: 2x30\" hold  Seated hamstring stretch: 2x30\" hold               Outcome Measure Options: Lower Extremity Functional Scale (LEFS)  Lower Extremity Functional Index  Any of your usual work, housework or school activities: Moderate difficulty  Your usual hobbies, recreational or sporting activities: Moderate difficulty  Getting into or out of the bath: No difficulty  Walking between rooms: No difficulty  Putting on your shoes or socks: No difficulty  Squatting: Quite a bit of difficulty  Lifting an object, like a bag of groceries from the floor: Moderate difficulty  Performing light activities around your home: A little bit of difficulty  Performing heavy activities around your home: Quite a bit of difficulty  Getting into or out of a car: A little bit of difficulty  Walking 2 blocks: A little bit of difficulty  Walking a mile: Moderate difficulty  Going up or down 10 stairs (about 1 flight of stairs): Moderate difficulty  Standing for 1 hour: A little bit of difficulty  Sitting for 1 hour: No difficulty  Running on even ground: Quite a bit of " difficulty  Running on uneven ground: Quite a bit of difficulty  Making sharp turns while running fast: Extreme difficulty or unable to perform activity  Hopping: Extreme difficulty or unable to perform activity  Rolling over in bed: No difficulty  Total: 46      Time Calculation:     Therapy Suggested Charges     Code   Minutes Charges    None             Start Time: 0851  Stop Time: 0933  Time Calculation (min): 42 min  Total Timed Code Minutes- PT: 16 minute(s)     Therapy Charges for Today     Code Description Service Date Service Provider Modifiers Qty    35847567166 HC PT MOBILITY CURRENT 10/10/2018 Erica Negro, PT GP, CJ 1    98675839191 HC PT MOBILITY PROJECTED 10/10/2018 Erica Negro, PT GP, [place] 1    42842821516 HC PT EVAL LOW COMPLEXITY 2 10/10/2018 Erica Negro, PT GP 1    36814387587 HC PT THER PROC EA 15 MIN 10/10/2018 Erica Negro, PT GP 1          PT G-Codes  PT Professional Judgement Used?: Yes  Outcome Measure Options: Lower Extremity Functional Scale (LEFS)  Total: 46  Functional Limitation: Mobility: Walking and moving around  Mobility: Walking and Moving Around Current Status (): At least 20 percent but less than 40 percent impaired, limited or restricted  Mobility: Walking and Moving Around Goal Status (): At least 1 percent but less than 20 percent impaired, limited or restricted         Erica Negro, PT  10/10/2018

## 2018-10-15 ENCOUNTER — HOSPITAL ENCOUNTER (OUTPATIENT)
Dept: PHYSICAL THERAPY | Facility: HOSPITAL | Age: 68
Setting detail: THERAPIES SERIES
Discharge: HOME | End: 2018-10-15

## 2018-10-15 DIAGNOSIS — M67.952 TENDINOPATHY OF LEFT GLUTEUS MEDIUS: Primary | ICD-10-CM

## 2018-10-15 PROCEDURE — 97110 THERAPEUTIC EXERCISES: CPT

## 2018-10-15 NOTE — THERAPY TREATMENT NOTE
"    Outpatient Physical Therapy Ortho Treatment Note  [facility]  Julia Vilchis PTA  10/15/18  3:52 PM       Patient Name: Emesron Sofia  : 1950  MRN: [id number]  Today's Date: 10/15/2018      Visit Date: 10/15/2018    Subjective Improvement: 0%       Attendance: 2  Approved: medicare; used 8 visits in 2018            MD follow up: ?          RC date: 10/31/18        Visit Dx:    ICD-10-CM ICD-9-CM   1. Tendinopathy of left gluteus medius M67.952 727.9       There is no problem list on file for this patient.       Past Medical History:   Diagnosis Date   • High cholesterol         Past Surgical History:   Procedure Laterality Date   • PACEMAKER IMPLANTATION     • ROTATOR CUFF REPAIR Right 10/2017             PT Ortho     Row Name 10/15/18 1500       Subjective Comments    Subjective Comments pt reports that the exercises that we gave him to do on the first day really hurt him afterwards but everyday since then has been getting better and better  -KM       Precautions and Contraindications    Precautions/Limitations pacemaker  -KM    Precautions Blood thinner use  -KM       Subjective Pain    Able to rate subjective pain? yes  -KM    Pre-Treatment Pain Level --   \"dull ache\"  -KM       Posture/Observations    Posture/Observations Comments No acute distress. Ambulates with non-antalgic gait with no AD. Pain at anterior L hip and distal glutes insertion with squatting ~60 deg.  -KM      User Key  (r) = Recorded By, (t) = Taken By, (c) = Cosigned By    Initials Name Provider Type     Julia Vilchis PTA Physical Therapy Assistant                            PT Assessment/Plan     Row Name 10/15/18 1500          PT Assessment    Functional Limitations Impaired gait;Impaired locomotion;Limitation in home management;Limitations in community activities;Performance in leisure activities  -KM     Impairments Gait;Impaired flexibility;Impaired muscle endurance;Muscle " "strength;Pain;Range of motion  -KM     Assessment Comments pt did very well overall with all LE stretches. Added bridges to HEP. Compliant with HEP thus far.  -KM     Rehab Potential Good  -KM     Patient/caregiver participated in establishment of treatment plan and goals Yes  -KM     Patient would benefit from skilled therapy intervention Yes  -KM        PT Plan    PT Frequency 2x/week  -KM     Predicted Duration of Therapy Intervention (Therapy Eval) 4-6 weeks  -KM     PT Plan Comments Manual STM/MFR if needed next  -KM       User Key  (r) = Recorded By, (t) = Taken By, (c) = Cosigned By    Initials Name Provider Type    Julia Medellin PTA Physical Therapy Assistant                    Exercises     Row Name 10/15/18 1500             Precautions    Existing Precautions/Restrictions pacemaker  -KM         Subjective Comments    Subjective Comments pt reports that the exercises that we gave him to do on the first day really hurt him afterwards but everyday since then has been getting better and better  -KM         Subjective Pain    Able to rate subjective pain? yes  -KM      Pre-Treatment Pain Level --   \"dull ache\"  -KM      Post-Treatment Pain Level 0  -KM         Aquatics    Aquatics performed? No  -KM         Exercise 1    Exercise Name 1 PRO II for ROM/end  -KM      Time 1 8 min  -KM      Additional Comments L3.0  -KM         Exercise 2    Exercise Name 2 Seated HS S  -KM      Reps 2 3  -KM      Time 2 30 sec hold  -KM         Exercise 3    Exercise Name 3 Piriformis S  -KM      Reps 3 3  -KM      Time 3 30 sec hold  -KM         Exercise 4    Exercise Name 4 Figure 4 S  -KM      Reps 4 3  -KM      Time 4 30 sec hold  -KM         Exercise 5    Exercise Name 5 Xiang S  -KM      Reps 5 3  -KM      Time 5 30 sec hold  -KM         Exercise 6    Exercise Name 6 Prone manual quad S  -KM      Reps 6 3  -KM      Time 6 30 sec hold  -KM         Exercise 7    Exercise Name 7 Supine SLRs  -KM      Sets 7 2  " -KM      Reps 7 10  -KM         Exercise 8    Exercise Name 8 SL SLR abd  -KM      Sets 8 2  -KM      Reps 8 10  -KM         Exercise 9    Exercise Name 9 SL clamshells  -KM      Sets 9 2  -KM      Reps 9 10  -KM         Exercise 10    Exercise Name 10 Prone hip ext  -KM      Sets 10 2  -KM      Reps 10 10  -KM         Exercise 11    Exercise Name 11 Bridges  -KM      Sets 11 2  -KM      Reps 11 10  -KM      Time 11 5 sec hold  -KM         Exercise 12    Exercise Name 12 HL hip add  -KM      Reps 12 2x10  -KM      Time 12 5 sec hold  -KM         Exercise 13    Exercise Name 13 HL hip abd  -KM      Sets 13 2  -KM      Reps 13 10  -KM      Additional Comments red'  -KM        User Key  (r) = Recorded By, (t) = Taken By, (c) = Cosigned By    Initials Name Provider Type    Julia Medellin, PTA Physical Therapy Assistant                               PT OP Goals     Row Name 10/15/18 1500          PT Short Term Goals    STG Date to Achieve 10/31/18  -KM     STG 1 Independent/compliant with HEP  -KM     STG 1 Progress Ongoing  -KM     STG 2 Tolerate 45 minute treatment session without increased pain  -KM     STG 2 Progress Ongoing  -KM     STG 3 Demonstrate L hip flex/abd MMT to 4+/5  -KM     STG 3 Progress Ongoing  -KM     STG 4 Demonstrate pain free passive L hip ER/IR AROM  -KM     STG 4 Progress Ongoing  -KM        Long Term Goals    LTG Date to Achieve 11/14/18  -KM     LTG 1 Subjectively report 60% improvement or greater  -KM     LTG 1 Progress Ongoing  -KM     LTG 2 Improve LEFS score to 55/80 or greater  -KM     LTG 2 Progress Ongoing  -KM     LTG 3 Demonstrate L hip hip flex/abd MMT to 5/5  -KM     LTG 3 Progress Ongoing  -KM     LTG 4 Demonstrate L hip ext MMT to 5/5  -KM     LTG 4 Progress Ongoing  -KM     LTG 5 Demonstrate L hip flex AROM to 115 deg wihtout increased pain  -KM     LTG 5 Progress Ongoing  -KM     LTG 6 Demonstrate ability to perform proper squat from 70-90 deg without increased pain   -KM     LTG 6 Progress Ongoing  -KM        Time Calculation    PT Goal Re-Cert Due Date 10/31/18  -LUCA       User Key  (r) = Recorded By, (t) = Taken By, (c) = Cosigned By    Initials Name Provider Type    Julia Medellin PTA Physical Therapy Assistant          Therapy Education  Given: HEP, Symptoms/condition management, Pain management, Posture/body mechanics  Program: New  How Provided: Verbal, Demonstration, Written  Provided to: Patient  Level of Understanding: Teach back education performed, Verbalized, Demonstrated              Time Calculation:   Start Time: 1510  Stop Time: 1550  Time Calculation (min): 40 min  Total Timed Code Minutes- PT: 40 minute(s)  Therapy Suggested Charges     Code   Minutes Charges    None           Therapy Charges for Today     Code Description Service Date Service Provider Modifiers Qty    71735572888 HC PT THER PROC EA 15 MIN 10/15/2018 Julia Bashir PTA GP 3                    Julia Bashir PTA  10/15/2018

## 2018-10-19 ENCOUNTER — HOSPITAL ENCOUNTER (OUTPATIENT)
Dept: PHYSICAL THERAPY | Facility: HOSPITAL | Age: 68
Setting detail: THERAPIES SERIES
Discharge: HOME | End: 2018-10-19

## 2018-10-19 DIAGNOSIS — M67.952 TENDINOPATHY OF LEFT GLUTEUS MEDIUS: Primary | ICD-10-CM

## 2018-10-19 PROCEDURE — 97110 THERAPEUTIC EXERCISES: CPT

## 2018-10-19 NOTE — THERAPY TREATMENT NOTE
Outpatient Physical Therapy Ortho Treatment Note  [facility]     Patient Name: Emersno Sofia  : 1950  MRN: [id number]  Today's Date: 10/19/2018      Visit Date: 10/19/2018  Visits 3/3. Recert 10/31. MD patterson/hari GALAN. % improvement.   Visit Dx:    ICD-10-CM ICD-9-CM   1. Tendinopathy of left gluteus medius M67.952 727.9       There is no problem list on file for this patient.       Past Medical History:   Diagnosis Date   • High cholesterol         Past Surgical History:   Procedure Laterality Date   • PACEMAKER IMPLANTATION     • ROTATOR CUFF REPAIR Right 10/2017             PT Ortho     Row Name 10/19/18 0900       Precautions and Contraindications    Precautions/Limitations pacemaker  -    Precautions Blood thinner use  -       Posture/Observations    Posture/Observations Comments NAG.  -      User Key  (r) = Recorded By, (t) = Taken By, (c) = Cosigned By    Initials Name Provider Type    Dixon Xiao PTA Physical Therapy Assistant                            PT Assessment/Plan     Row Name 10/19/18 1000          PT Assessment    Assessment Comments Good madhu of today's ther ex. Good sub reports of increased ROM and decreased reactivity with exercising.   -        PT Plan    PT Frequency 2x/week  -     Predicted Duration of Therapy Intervention (Therapy Eval) 4-6 weeks  -     PT Plan Comments Cont per POC  -       User Key  (r) = Recorded By, (t) = Taken By, (c) = Cosigned By    Initials Name Provider Type    Dixon Xiao PTA Physical Therapy Assistant                    Exercises     Row Name 10/19/18 0900             Precautions    Existing Precautions/Restrictions pacemaker  -         Subjective Comments    Subjective Comments Pt reports sx are better. He thinks this is helping him.   -         Subjective Pain    Able to rate subjective pain? yes  -      Pre-Treatment Pain Level 0  -      Post-Treatment Pain Level 0  -         Aquatics    Aquatics  "performed? No  -JW         Exercise 1    Exercise Name 1 PRO II for ROM/end  -JW      Time 1 10  -JW      Additional Comments L3.0  -JW         Exercise 2    Exercise Name 2 HS S  -JW      Reps 2 2  -JW      Time 2 30\"  -JW         Exercise 3    Exercise Name 3 Piriformis S  -JW      Reps 3 2  -JW      Time 3 30\"  -JW         Exercise 4    Exercise Name 4 Figure 4 S  -JW      Reps 4 2  -JW      Time 4 30\"  -JW         Exercise 5    Exercise Name 5 Xiang S  -JW      Reps 5 2  -JW      Time 5 30\"  -JW         Exercise 6    Exercise Name 6 Prone manual quad S  -JW      Reps 6 2  -JW      Time 6 30\"  -JW         Exercise 7    Exercise Name 7 Prone manual hip IR  -JW      Sets 7 2  -JW      Reps 7 10  -JW         Exercise 8    Exercise Name 8 Clamshells  -JW      Reps 8 20  -JW         Exercise 9    Exercise Name 9 SLR  -JW      Reps 9 20  -JW         Exercise 10    Exercise Name 10 H/L hip add  -JW      Reps 10 20  -JW         Exercise 11    Exercise Name 11 Bridges  -JW      Reps 11 20  -JW      Time 11 2-3\"  -JW        User Key  (r) = Recorded By, (t) = Taken By, (c) = Cosigned By    Initials Name Provider Type    JW Dixon Bella, PTA Physical Therapy Assistant                               PT OP Goals     Row Name 10/19/18 1009 10/19/18 0900       PT Short Term Goals    STG Date to Achieve  -- 10/31/18  -JW    STG 1  -- Independent/compliant with HEP  -JW    STG 1 Progress  -- Ongoing  -JW    STG 2  -- Tolerate 45 minute treatment session without increased pain  -JW    STG 2 Progress  -- Ongoing  -JW    STG 3  -- Demonstrate L hip flex/abd MMT to 4+/5  -JW    STG 3 Progress  -- Ongoing  -JW    STG 4  -- Demonstrate pain free passive L hip ER/IR AROM  -JW    STG 4 Progress  -- Ongoing  -JW       Long Term Goals    LTG Date to Achieve  -- 11/14/18  -JW    LTG 1  -- Subjectively report 60% improvement or greater  -JW    LTG 1 Progress  -- Ongoing  -JW    LTG 2  -- Improve LEFS score to 55/80 or greater  -JW    LTG " 2 Progress  -- Ongoing  -    LTG 3  -- Demonstrate L hip hip flex/abd MMT to 5/5  -    LTG 3 Progress  -- Ongoing  -    LTG 4  -- Demonstrate L hip ext MMT to 5/5  -    LTG 4 Progress  -- Ongoing  -    LTG 5  -- Demonstrate L hip flex AROM to 115 deg wihtout increased pain  -    LTG 5 Progress  -- Ongoing  -    LTG 6  -- Demonstrate ability to perform proper squat from 70-90 deg without increased pain  -[facility] 6 Progress  -- Ongoing  -       Time Calculation    PT Goal Re-Cert Due Date 10/31/18  -  --      User Key  (r) = Recorded By, (t) = Taken By, (c) = Cosigned By    Initials Name Provider Type    Dixon Xiao PTA Physical Therapy Assistant                         Time Calculation:   Start Time: 0930  Stop Time: 1005  Time Calculation (min): 35 min  Total Timed Code Minutes- PT: 35 minute(s)  Therapy Suggested Charges     Code   Minutes Charges    None           Therapy Charges for Today     Code Description Service Date Service Provider Modifiers Qty    75408063709 HC PT THER PROC EA 15 MIN 10/19/2018 Dixon Bella PTA GP 2                    Dixon Bella PTA  10/19/2018

## 2018-10-22 ENCOUNTER — HOSPITAL ENCOUNTER (OUTPATIENT)
Dept: PHYSICAL THERAPY | Facility: HOSPITAL | Age: 68
Setting detail: THERAPIES SERIES
Discharge: HOME | End: 2018-10-22

## 2018-10-22 DIAGNOSIS — M67.952 TENDINOPATHY OF LEFT GLUTEUS MEDIUS: Primary | ICD-10-CM

## 2018-10-22 PROCEDURE — 97110 THERAPEUTIC EXERCISES: CPT

## 2018-10-22 NOTE — THERAPY TREATMENT NOTE
Outpatient Physical Therapy Ortho Treatment Note  [facility]     Patient Name: Emerson Sofia  : 1950  MRN: [id number]  Today's Date: 10/22/2018      Visit Date: 10/22/2018  Subjective Improvement:  80%     Attendance:   Approved:  medicare MD follow up:   ?         date:  10/31       Visit Dx:    ICD-10-CM ICD-9-CM   1. Tendinopathy of left gluteus medius M67.952 727.9       There is no problem list on file for this patient.       Past Medical History:   Diagnosis Date   • High cholesterol         Past Surgical History:   Procedure Laterality Date   • PACEMAKER IMPLANTATION     • ROTATOR CUFF REPAIR Right 10/2017             PT Ortho     Row Name 10/22/18 1500       Precautions and Contraindications    Precautions/Limitations pacemaker  -PM    Precautions blood thinner  -PM       Subjective Pain    Post-Treatment Pain Level 0  -PM      User Key  (r) = Recorded By, (t) = Taken By, (c) = Cosigned By    Initials Name Provider Type    Annabelle Key PTA Physical Therapy Assistant                            PT Assessment/Plan     Row Name 10/22/18 1500          PT Assessment    Assessment Comments Pt with good subjective improvement. Pauly 60-70 deg squats very well without c/o;s Deep squat still has some pn.  -PM     Rehab Potential Good  -PM     Patient/caregiver participated in establishment of treatment plan and goals Yes  -PM     Patient would benefit from skilled therapy intervention Yes  -PM        PT Plan    PT Frequency 2x/week  -PM     Predicted Duration of Therapy Intervention (Therapy Eval) 4-6 weeks  -PM     PT Plan Comments AirEx HKM, squats; lifts from stool  -PM       User Key  (r) = Recorded By, (t) = Taken By, (c) = Cosigned By    Initials Name Provider Type    Annabelle Key PTA Physical Therapy Assistant                    Exercises     Row Name 10/22/18 1500             Precautions    Existing Precautions/Restrictions pacemaker  -PM       "   Subjective Comments    Subjective Comments Pt states he is at least 80% better overall; doing much better since therapy and exc at home.  -PM         Subjective Pain    Able to rate subjective pain? yes  -PM      Pre-Treatment Pain Level 0  -PM      Post-Treatment Pain Level 0  -PM         Aquatics    Aquatics performed? No  -PM         Exercise 1    Exercise Name 1 PRO II for ROM/end  -PM      Time 1 10  -PM      Additional Comments L4  -PM         Exercise 2    Exercise Name 2 HS S  -PM      Reps 2 2  -PM      Time 2 30\"  -PM         Exercise 3    Exercise Name 3 Piriformis S  -PM      Reps 3 2  -PM      Time 3 30\"  -PM         Exercise 4    Exercise Name 4 Figure 4 S  -PM      Reps 4 2  -PM      Time 4 30\"  -PM         Exercise 5    Exercise Name 5 Xiang S  -PM      Reps 5 2  -PM      Time 5 30\"  -PM         Exercise 6    Exercise Name 6 std mod quad S  -PM      Reps 6 1  -PM      Time 6 30\"  -PM      Additional Comments \"no particular S\"  -PM         Exercise 7    Exercise Name 7 Prone manual hip IR; man quad S  -PM      Sets 7 --  -PM      Reps 7 3  -PM      Time 7 20\"  -PM         Exercise 8    Exercise Name 8 Clamshells, SL  -PM      Sets 8 2  -PM      Reps 8 10  -PM         Exercise 9    Exercise Name 9 SLR FF supine  -PM      Reps 9 20  -PM         Exercise 10    Exercise Name 10 H/L hip add/bridge  -PM      Reps 10 20  -PM         Exercise 11    Exercise Name 11 SL hip abd SLR  -PM      Cueing 11 Verbal  -PM      Sets 11 2  -PM      Reps 11 10  -PM      Time 11 --  -PM         Exercise 12    Exercise Name 12 prone IR w/TB  -PM      Cueing 12 Verbal  -PM      Reps 12 2x10  -PM      Additional Comments Level 1  -PM         Exercise 13    Exercise Name 13 std march in place  -PM      Cueing 13 Verbal  -PM      Sets 13 2  -PM      Reps 13 10  -PM         Exercise 14    Exercise Name 14 step up F/L  -PM      Reps 14 20  -PM      Additional Comments 6\"  -PM         Exercise 15    Exercise Name 15 Squats in " front of chair at HR  -PM      Cueing 15 Verbal  -PM      Sets 15 2  -PM      Reps 15 10  -PM      Additional Comments appr 60-70 deg  -PM        User Key  (r) = Recorded By, (t) = Taken By, (c) = Cosigned By    Initials Name Provider Type    PM Annabelle Bolanos PTA Physical Therapy Assistant                               PT OP Goals     Row Name 10/22/18 1500          PT Short Term Goals    STG Date to Achieve 10/31/18  -PM     STG 1 Independent/compliant with HEP  -PM     STG 1 Progress Progressing  -PM     STG 2 Tolerate 45 minute treatment session without increased pain  -PM     STG 2 Progress Progressing  -PM     STG 3 Demonstrate L hip flex/abd MMT to 4+/5  -PM     STG 3 Progress Progressing  -PM     STG 4 Demonstrate pain free passive L hip ER/IR AROM  -PM     STG 4 Progress Ongoing  -PM        Long Term Goals    LTG Date to Achieve 11/14/18  -PM     LTG 1 Subjectively report 60% improvement or greater  -PM     LTG 1 Progress Met  -PM     LTG 2 Improve LEFS score to 55/80 or greater  -PM     LTG 2 Progress Ongoing  -PM     LTG 3 Demonstrate L hip hip flex/abd MMT to 5/5  -PM     LTG 3 Progress Ongoing  -PM     LTG 4 Demonstrate L hip ext MMT to 5/5  -PM     LTG 4 Progress Ongoing  -PM     LTG 5 Demonstrate L hip flex AROM to 115 deg wihtout increased pain  -PM     LTG 5 Progress Ongoing  -PM     LTG 6 Demonstrate ability to perform proper squat from 70-90 deg without increased pain  -PM     LTG 6 Progress Ongoing  -PM        Time Calculation    PT Goal Re-Cert Due Date 10/31/18  -PM       User Key  (r) = Recorded By, (t) = Taken By, (c) = Cosigned By    Initials Name Provider Type    PM Annabelle Bolanos PTA Physical Therapy Assistant          Therapy Education  Given: HEP, Symptoms/condition management, Pain management, Posture/body mechanics  Program: Reinforced  How Provided: Verbal, Demonstration, Written  Provided to: Patient  Level of Understanding: Teach back education performed, Verbalized,  Demonstrated              Time Calculation:   Start Time: 1516  Stop Time: 1600  Time Calculation (min): 44 min  Total Timed Code Minutes- PT: 44 minute(s)  Therapy Suggested Charges     Code   Minutes Charges    None           Therapy Charges for Today     Code Description Service Date Service Provider Modifiers Qty    74195710366 HC PT THER PROC EA 15 MIN 10/22/2018 Annabelle Bolanos, ANALIA GP 3                    Annabelle Bolanos PTA  10/22/2018

## 2018-10-24 ENCOUNTER — HOSPITAL ENCOUNTER (OUTPATIENT)
Dept: PHYSICAL THERAPY | Facility: HOSPITAL | Age: 68
Setting detail: THERAPIES SERIES
Discharge: HOME | End: 2018-10-24

## 2018-10-24 DIAGNOSIS — M67.952 TENDINOPATHY OF LEFT GLUTEUS MEDIUS: Primary | ICD-10-CM

## 2018-10-24 DIAGNOSIS — S46.011A TRAUMATIC COMPLETE TEAR OF RIGHT ROTATOR CUFF, INITIAL ENCOUNTER: ICD-10-CM

## 2018-10-24 PROCEDURE — 97110 THERAPEUTIC EXERCISES: CPT

## 2018-10-30 ENCOUNTER — HOSPITAL ENCOUNTER (OUTPATIENT)
Dept: PHYSICAL THERAPY | Facility: HOSPITAL | Age: 68
Setting detail: THERAPIES SERIES
Discharge: HOME | End: 2018-10-30

## 2018-10-30 DIAGNOSIS — M67.952 TENDINOPATHY OF LEFT GLUTEUS MEDIUS: Primary | ICD-10-CM

## 2018-10-30 PROCEDURE — 97110 THERAPEUTIC EXERCISES: CPT | Performed by: PHYSICAL THERAPIST

## 2018-10-30 PROCEDURE — G8978 MOBILITY CURRENT STATUS: HCPCS | Performed by: PHYSICAL THERAPIST

## 2018-10-30 PROCEDURE — G8979 MOBILITY GOAL STATUS: HCPCS | Performed by: PHYSICAL THERAPIST

## 2018-10-30 NOTE — THERAPY PROGRESS REPORT/RE-CERT
Outpatient Physical Therapy Ortho Progress Note  [facility]     Patient Name: Emerson Sofia  : 1950  MRN: [id number]  Today's Date: 10/30/2018      Visit Date: 10/30/2018     Subjective Improvement: 90%      Attendance:   Approved:   Medicare guidelines        MD follow up:     18       date:     18      There is no problem list on file for this patient.       Past Medical History:   Diagnosis Date   • High cholesterol         Past Surgical History:   Procedure Laterality Date   • PACEMAKER IMPLANTATION     • ROTATOR CUFF REPAIR Right 10/2017       Visit Dx:     ICD-10-CM ICD-9-CM   1. Tendinopathy of left gluteus medius M67.952 727.9                 PT Ortho     Row Name 10/30/18 1500       Precautions and Contraindications    Precautions/Limitations pacemaker  -KG    Precautions blood thinner  -KG       Subjective Pain    Able to rate subjective pain? yes  -KG    Post-Treatment Pain Level 0  -KG       Posture/Observations    Posture/Observations Comments No acute distress. Non-antalgic gait.  -KG       Hip/Thigh Palpation    Iliopsoas Left:;Tender   Very minimal  -KG       Left Lower Ext    Lt Hip Flexion AROM 110  -KG    Lt Hip External Rotation AROM 24  -KG    Lt Hip Internal Rotation AROM 23  -KG    LT Lower Extremity Comments No pain with passive hip ROM this dated  -KG       MMT Right Lower Ext    Rt Hip Flexion MMT, Gross Movement (5/5) normal  -KG    Rt Hip Extension MMT, Gross Movement (5/5) normal  -KG    Rt Hip ABduction MMT, Gross Movement (5/5) normal  -KG    Rt Hip ADduction MMT, Gross Movement (5/5) normal  -KG    Rt Knee Extension MMT, Gross Movement (5/5) normal  -KG    Rt Knee Flexion MMT, Gross Movement (5/5) normal  -KG       MMT Left Lower Ext    Lt Hip Flexion MMT, Gross Movement (4+/5) good plus  -KG    Lt Hip Extension MMT, Gross Movement (4+/5) good plus  -KG    Lt Hip ABduction MMT, Gross Movement (4+/5) good plus  -KG    Lt Hip ADduction MMT,  "Gross Movement (4+/5) good plus  -KG    Lt Knee Extension MMT, Gross Movement (5/5) normal  -KG    Lt Knee Flexion MMT, Gross Movement (5/5) normal  -KG       Sensation    Sensation WNL? WNL  -KG    Light Touch No apparent deficits  -KG       Lower Extremity Flexibility    Hip Flexors Left:;Mildly limited  -KG    Quadriceps Left:;Mildly limited  -KG    Hip External Rotators Left:;Mildly limited  -KG    Hip Internal Rotators Left:;Mildly limited  -KG       Balance Skills Training    SLS L/R single leg stance 25\" bilaterally; no compensations noted.  -KG      User Key  (r) = Recorded By, (t) = Taken By, (c) = Cosigned By    Initials Name Provider Type    KG Erica Negro, PT Physical Therapist                      Therapy Education  Education Details: Added standing hip flex, abd, ext with tband  Given: HEP, Symptoms/condition management, Posture/body mechanics  Program: Progressed, Reinforced  How Provided: Verbal, Demonstration, Written  Provided to: Patient  Level of Understanding: Teach back education performed, Verbalized, Demonstrated           PT OP Goals     Row Name 10/30/18 1500          PT Short Term Goals    STG Date to Achieve 10/31/18  -KG     STG 1 Independent/compliant with HEP  -KG     STG 1 Progress Met  -KG     STG 2 Tolerate 45 minute treatment session without increased pain  -KG     STG 2 Progress Met  -KG     STG 3 Demonstrate L hip flex/abd MMT to 4+/5  -KG     STG 3 Progress Progressing  -KG     STG 4 Demonstrate pain free passive L hip ER/IR ROM  -KG     STG 4 Progress Ongoing  -KG        Long Term Goals    LTG Date to Achieve 11/20/18  -KG     LTG 1 Subjectively report 60% improvement or greater  -KG     LTG 1 Progress Met  -KG     LTG 2 Improve LEFS score to 55/80 or greater  -KG     LTG 2 Progress Met  -KG     LTG 3 Demonstrate L hip flex/abd MMT to 5/5  -KG     LTG 3 Progress Partially Met  -KG     LTG 3 Progress Comments 4+/5  -KG     LTG 4 Demonstrate L hip ext MMT to 5/5  -KG     " LTG 4 Progress Partially Met  -KG     LTG 4 Progress Comments 4+/5  -KG     LTG 5 Demonstrate L hip flex AROM to 115 deg wihtout increased pain  -KG     LTG 5 Progress Ongoing  -KG     LTG 6 Demonstrate ability to perform proper squat from 70-90 deg without increased pain  -KG     LTG 6 Progress Met  -KG        Time Calculation    PT Goal Re-Cert Due Date 11/20/18  -KG       User Key  (r) = Recorded By, (t) = Taken By, (c) = Cosigned By    Initials Name Provider Type    KG Erica Negro, PT Physical Therapist                PT Assessment/Plan     Row Name 10/30/18 1500          PT Assessment    Functional Limitations Impaired gait;Impaired locomotion;Limitation in home management;Limitations in community activities;Performance in leisure activities  -KG     Impairments Gait;Impaired flexibility;Impaired muscle endurance;Muscle strength;Pain;Range of motion  -KG     Assessment Comments Pt progressing very well with PT at this time as evidenced by improvements in overall hip strength, ROM, and subjective reports of improved tolerance and less pain with activities at home. Strength deficits still noted at L hip/glutes. Tolerated new [facility] activities well this date, able to perform deep squat without any pain. Pt will continue to benefit from skilled PT services to further improve functional strength & tolerance/safety to functional activities.  -KG     Rehab Potential Good  -KG     Patient/caregiver participated in establishment of treatment plan and goals Yes  -KG     Patient would benefit from skilled therapy intervention Yes  -KG        PT Plan    PT Frequency 2x/week  -KG     Predicted Duration of Therapy Intervention (Therapy Eval) 2-3 more weeks  -KG     PT Plan Comments Continue to progress CK hip strengthening. Introduction to cybex next visit. MD note next visit.  -KG       User Key  (r) = Recorded By, (t) = Taken By, (c) = Cosigned By    Initials Name Provider Type    KG Erica Negro, PT Physical  "Therapist                  Exercises     Row Name 10/30/18 1500             Precautions    Existing Precautions/Restrictions pacemaker  -KG         Subjective Comments    Subjective Comments Pt reports 90% overall subjective improvement at this time. States he only has pain now if he's squatting for too long or overlifting.  -KG         Subjective Pain    Able to rate subjective pain? yes  -KG      Pre-Treatment Pain Level 0  -KG      Post-Treatment Pain Level 0  -KG         Aquatics    Aquatics performed? No  -KG         Exercise 1    Exercise Name 1 PRO II for ROM/end  -KG      Time 1 10  -KG      Additional Comments L 5.0  -KG         Exercise 2    Exercise Name 2 HS S  -KG      Reps 2 2  -KG      Time 2 30\"  -KG         Exercise 3    Exercise Name 3 Piriformis S  -KG      Reps 3 2  -KG      Time 3 30\"  -KG         Exercise 4    Exercise Name 4 Airex mini-squats  -KG      Cueing 4 Verbal  -KG      Sets 4 2  -KG      Reps 4 10  -KG         Exercise 5    Exercise Name 5 Fwd step up 6\" + airex  -KG      Cueing 5 Verbal  -KG      Sets 5 1  -KG      Reps 5 20  -KG         Exercise 6    Exercise Name 6 Lat step ups 6\" + airex  -KG      Cueing 6 Verbal  -KG      Sets 6 1  -KG      Reps 6 20  -KG         Exercise 7    Exercise Name 7 Physioball wall squats  -KG      Cueing 7 Verbal  -KG      Sets 7 2  -KG      Reps 7 10  -KG         Exercise 8    Exercise Name 8 CC resisted gait 4-way  -KG      Cueing 8 Verbal  -KG      Sets 8 1  -KG      Reps 8 5 ea direction  -KG      Additional Comments 3 plates  -KG         Exercise 9    Exercise Name 9 Monster walks in hallway  -KG      Cueing 9 Verbal  -KG      Sets 9 1  -KG      Reps 9 4 (20 ft)  -KG      Additional Comments Red tband  -KG         Exercise 10    Exercise Name 10 Lat sidestepping with tband below knees  -KG      Cueing 10 Verbal  -KG      Sets 10 1  -KG      Reps 10 4 (20 ft)  -KG      Additional Comments Re tband  -KG         Exercise 11    Exercise Name 11 " Standing hip abd  -KG      Cueing 11 Verbal  -KG      Sets 11 2  -KG      Reps 11 10  -KG      Additional Comments Yellow tband  -KG         Exercise 12    Exercise Name 12 Standing hip ext  -KG      Cueing 12 Verbal  -KG      Reps 12 2x10  -KG      Additional Comments Yellow tband  -KG        User Key  (r) = Recorded By, (t) = Taken By, (c) = Cosigned By    Initials Name Provider Type    Erica Jenkins, PT Physical Therapist                        Outcome Measure Options: Lower Extremity Functional Scale (LEFS)  Lower Extremity Functional Index  Any of your usual work, housework or school activities: A little bit of difficulty  Your usual hobbies, recreational or sporting activities: A little bit of difficulty  Getting into or out of the bath: No difficulty  Walking between rooms: No difficulty  Putting on your shoes or socks: No difficulty  Squatting: A little bit of difficulty  Lifting an object, like a bag of groceries from the floor: No difficulty  Performing light activities around your home: No difficulty  Performing heavy activities around your home: A little bit of difficulty  Getting into or out of a car: No difficulty  Walking 2 blocks: No difficulty  Walking a mile: A little bit of difficulty  Going up or down 10 stairs (about 1 flight of stairs): A little bit of difficulty  Standing for 1 hour: No difficulty  Sitting for 1 hour: No difficulty  Running on even ground: A little bit of difficulty  Running on uneven ground: A little bit of difficulty  Making sharp turns while running fast: A little bit of difficulty  Hopping: A little bit of difficulty  Rolling over in bed: No difficulty  Total: 70      Time Calculation:     Therapy Suggested Charges     Code   Minutes Charges    None             Start Time: 1515  Stop Time: 1602  Time Calculation (min): 47 min  Total Timed Code Minutes- PT: 47 minute(s)     Therapy Charges for Today     Code Description Service Date Service Provider Modifiers Qty     69312791225 HC PT MOBILITY CURRENT 10/30/2018 Erica Negro, PT GP, CI 1    76187457433 HC PT MOBILITY PROJECTED 10/30/2018 Erica Negro, PT GP, CI 1    [id number] HC PT THER PROC EA 15 MIN 10/30/2018 Erica Negro, PT GP 3          PT G-Codes  PT Professional Judgement Used?: Yes  Outcome Measure Options: Lower Extremity Functional Scale (LEFS)  Total: 70  Functional Limitation: Mobility: Walking and moving around  Mobility: Walking and Moving Around Current Status (): At least 1 percent but less than 20 percent impaired, limited or restricted  Mobility: Walking and Moving Around Goal Status (): At least 1 percent but less than 20 percent impaired, limited or restricted         Erica Negro, PT  10/30/2018

## 2018-11-02 ENCOUNTER — HOSPITAL ENCOUNTER (OUTPATIENT)
Dept: PHYSICAL THERAPY | Facility: HOSPITAL | Age: 68
Setting detail: THERAPIES SERIES
Discharge: HOME | End: 2018-11-02

## 2018-11-02 DIAGNOSIS — M67.952 TENDINOPATHY OF LEFT GLUTEUS MEDIUS: Primary | ICD-10-CM

## 2018-11-02 PROCEDURE — 97110 THERAPEUTIC EXERCISES: CPT | Performed by: PHYSICAL THERAPIST

## 2018-11-02 NOTE — THERAPY TREATMENT NOTE
Outpatient Physical Therapy Ortho Treatment Note  [facility]     Patient Name: Emerson Sofia  : 1950  MRN: [id number]  Today's Date: 2018      Visit Date: 2018     Subjective Improvement: 90%      Attendance:   Approved:   Medicare guidelines        MD follow up:     18      RC date:     18    Visit Dx:    ICD-10-CM ICD-9-CM   1. Tendinopathy of left gluteus medius M67.952 727.9       There is no problem list on file for this patient.       Past Medical History:   Diagnosis Date   • High cholesterol         Past Surgical History:   Procedure Laterality Date   • PACEMAKER IMPLANTATION     • ROTATOR CUFF REPAIR Right 10/2017             PT Ortho     Row Name 18 1000       Subjective Comments    Subjective Comments Pt states his hip continues to do better everyday. Goes to MD 18  -KG       Precautions and Contraindications    Precautions/Limitations pacemaker  -KG    Precautions blood thinner  -KG       Posture/Observations    Posture/Observations Comments No acute distress. Non-antalgic gait.  -KG      User Key  (r) = Recorded By, (t) = Taken By, (c) = Cosigned By    Initials Name Provider Type    KG Erica Negro, PT Physical Therapist                            PT Assessment/Plan     Row Name 18 1000          PT Assessment    Functional Limitations Impaired gait;Impaired locomotion;Limitation in home management;Limitations in community activities;Performance in leisure activities  -KG     Impairments Gait;Impaired flexibility;Impaired muscle endurance;Muscle strength;Pain;Range of motion  -KG     Assessment Comments Pt continues to progress very well with therapy. Good tolerance to cybex activities and new CKC activities. No pain reported throughout treatment. Pt goes to MD on 18  -KG     Rehab Potential Good  -KG     Patient/caregiver participated in establishment of treatment plan and goals Yes  -KG     Patient would benefit from  "skilled therapy intervention Yes  -KG        PT Plan    PT Frequency 2x/week  -KG     Predicted Duration of Therapy Intervention (Therapy Eval) 2-3 more weeks  -KG     PT Plan Comments Follow up on MD appt; continue to progress strengthening and transition to independent management.  -KG       User Key  (r) = Recorded By, (t) = Taken By, (c) = Cosigned By    Initials Name Provider Type    KG Erica Negro MAYELA, PT Physical Therapist                    Exercises     Row Name 11/02/18 1000             Precautions    Existing Precautions/Restrictions pacemaker  -KG         Subjective Comments    Subjective Comments Pt states his hip continues to do better everyday. Goes to MD 11/6/18  -KG         Subjective Pain    Able to rate subjective pain? yes  -KG      Pre-Treatment Pain Level 0  -KG      Post-Treatment Pain Level 0  -KG         Aquatics    Aquatics performed? No  -KG         Exercise 1    Exercise Name 1 PRO II for ROM/end  -KG      Time 1 10 min  -KG      Additional Comments L 5.0  -KG         Exercise 2    Exercise Name 2 HS S  -KG      Reps 2 2  -KG      Time 2 30\"  -KG         Exercise 3    Exercise Name 3 Cybex LP2  -KG      Cueing 3 Verbal  -KG      Sets 3 2  -KG      Reps 3 10  -KG      Additional Comments 100#  -KG         Exercise 4    Exercise Name 4 Cybex LP1  -KG      Cueing 4 Verbal  -KG      Sets 4 2  -KG      Reps 4 10  -KG      Additional Comments 65#  -KG         Exercise 5    Exercise Name 5 Reverse BOSU mini squats  -KG      Cueing 5 Verbal  -KG      Sets 5 2  -KG      Reps 5 10  -KG         Exercise 6    Exercise Name 6 BOSU lat step up/over  -KG      Cueing 6 Verbal  -KG      Sets 6 1  -KG      Reps 6 15  -KG         Exercise 7    Exercise Name 7 Physioball wall squats  -KG      Cueing 7 Verbal  -KG      Sets 7 2  -KG      Reps 7 10  -KG         Exercise 8    Exercise Name 8 CC resisted gait 4-way  -KG      Cueing 8 Verbal  -KG      Sets 8 1  -KG      Reps 8 5 ea direction  -KG         " Exercise 9    Exercise Name 9 RDL LLE with 4# KB  -KG      Cueing 9 Verbal  -KG      Sets 9 1  -KG      Reps 9 10  -KG         Exercise 10    Exercise Name 10 Alt lunges  -KG      Cueing 10 Verbal;Tactile  -KG      Sets 10 1  -KG      Reps 10 10  -KG         Exercise 11    Exercise Name 11 BOSU fwd steps ups  -KG      Cueing 11 Verbal  -KG      Sets 11 2  -KG      Reps 11 10  -KG        User Key  (r) = Recorded By, (t) = Taken By, (c) = Cosigned By    Initials Name Provider Type    Erica Jenkins, PT Physical Therapist                               PT OP Goals     Row Name 11/02/18 1000          PT Short Term Goals    STG Date to Achieve --   All STG's met  -KG     STG 1 Independent/compliant with HEP  -KG     STG 1 Progress Met  -KG     STG 2 Tolerate 45 minute treatment session without increased pain  -KG     STG 2 Progress Met  -KG     STG 3 Demonstrate L hip flex/abd MMT to 4+/5  -KG     STG 3 Progress Met  -KG     STG 4 Demonstrate pain free passive L hip ER/IR ROM  -KG     STG 4 Progress Met  -KG        Long Term Goals    LTG Date to Achieve 11/20/18  -KG     LTG 1 Subjectively report 60% improvement or greater  -KG     LTG 1 Progress Met  -KG     LTG 2 Improve LEFS score to 55/80 or greater  -KG     LTG 2 Progress Met  -KG     LTG 3 Demonstrate L hip flex/abd MMT to 5/5  -KG     LTG 3 Progress Partially Met  -KG     LTG 4 Demonstrate L hip ext MMT to 5/5  -KG     LTG 4 Progress Partially Met  -KG     LTG 5 Demonstrate L hip flex AROM to 115 deg wihtout increased pain  -KG     LTG 5 Progress Ongoing  -KG     LTG 6 Demonstrate ability to perform proper squat from 70-90 deg without increased pain  -KG     LTG 6 Progress Met  -KG        Time Calculation    PT Goal Re-Cert Due Date 11/20/18  -KG       User Key  (r) = Recorded By, (t) = Taken By, (c) = Cosigned By    Initials Name Provider Type    Erica Jenkins, PT Physical Therapist          Therapy Education  Given: HEP, Symptoms/condition  management, Pain management, Posture/body mechanics  Program: Reinforced  How Provided: Verbal, Demonstration, Written  Provided to: Patient  Level of Understanding: Teach back education performed, Verbalized, Demonstrated              Time Calculation:   Start Time: 1020  Stop Time: 1105  Time Calculation (min): 45 min  Therapy Suggested Charges     Code   Minutes Charges    None           Therapy Charges for Today     Code Description Service Date Service Provider Modifiers Qty    81989563448 HC PT THER PROC EA 15 MIN 11/2/2018 Erica Negro, PT GP 3                    Erica Negro, PT  11/2/2018

## 2018-11-07 ENCOUNTER — APPOINTMENT (OUTPATIENT)
Dept: PHYSICAL THERAPY | Facility: HOSPITAL | Age: 68
End: 2018-11-07

## 2018-11-09 ENCOUNTER — HOSPITAL ENCOUNTER (OUTPATIENT)
Dept: PHYSICAL THERAPY | Facility: HOSPITAL | Age: 68
Setting detail: THERAPIES SERIES
Discharge: HOME | End: 2018-11-09

## 2018-11-09 DIAGNOSIS — M67.952 TENDINOPATHY OF LEFT GLUTEUS MEDIUS: Primary | ICD-10-CM

## 2018-11-09 PROCEDURE — G8978 MOBILITY CURRENT STATUS: HCPCS | Performed by: PHYSICAL THERAPIST

## 2018-11-09 PROCEDURE — G8980 MOBILITY D/C STATUS: HCPCS | Performed by: PHYSICAL THERAPIST

## 2018-11-09 PROCEDURE — G8979 MOBILITY GOAL STATUS: HCPCS | Performed by: PHYSICAL THERAPIST

## 2018-11-09 PROCEDURE — 97110 THERAPEUTIC EXERCISES: CPT | Performed by: PHYSICAL THERAPIST

## 2018-11-09 NOTE — THERAPY DISCHARGE NOTE
Outpatient Physical Therapy Ortho Treatment Note/Discharge Summary  [facility]     Patient Name: Emerson Sofia  : 1950  MRN: [id number]  Today's Date: 2018      Visit Date: 2018     Subjective Improvement: 100%      Attendance:   Approved:   Medicare guidelines        MD follow up:    Released        date:   N/A per discharge        Visit Dx:    ICD-10-CM ICD-9-CM   1. Tendinopathy of left gluteus medius M67.952 727.9       There is no problem list on file for this patient.       Past Medical History:   Diagnosis Date   • High cholesterol         Past Surgical History:   Procedure Laterality Date   • PACEMAKER IMPLANTATION     • ROTATOR CUFF REPAIR Right 10/2017             PT Ortho     Row Name 18 1000       Subjective Comments    Subjective Comments MD pleased with his progress and released him from his care. States he did quite a bit of lifting with moving AC units yesterday and had no pain. Pt reports he's pretty much back to 100%.  -KG       Precautions and Contraindications    Precautions/Limitations pacemaker  -KG    Precautions blood thinner  -KG       Subjective Pain    Able to rate subjective pain? yes  -KG    Pre-Treatment Pain Level 0  -KG    Post-Treatment Pain Level 0  -KG       Posture/Observations    Posture/Observations Comments No acute distress. Non-antalgic gait.  -KG       Hip/Thigh Palpation    Hip/Thigh Palpation? No Tenderness/Abnormality  -KG       Left Lower Ext    Lt Hip Flexion AROM 115 deg  -KG       MMT Right Lower Ext    Rt Hip Flexion MMT, Gross Movement (5/5) normal  -KG    Rt Hip Extension MMT, Gross Movement (5/5) normal  -KG    Rt Hip ABduction MMT, Gross Movement (5/5) normal  -KG    Rt Hip ADduction MMT, Gross Movement (5/5) normal  -KG    Rt Knee Extension MMT, Gross Movement (5/5) normal  -KG    Rt Knee Flexion MMT, Gross Movement (5/5) normal  -KG       MMT Left Lower Ext    Lt Hip Flexion MMT, Gross Movement (5/5) normal   -KG    Lt Hip Extension MMT, Gross Movement (5/5) normal  -KG    Lt Hip ABduction MMT, Gross Movement (5/5) normal  -KG    Lt Hip ADduction MMT, Gross Movement (5/5) normal  -KG    Lt Knee Extension MMT, Gross Movement (5/5) normal  -KG    Lt Knee Flexion MMT, Gross Movement (5/5) normal  -KG      User Key  (r) = Recorded By, (t) = Taken By, (c) = Cosigned By    Initials Name Provider Type    KG Erica Negro PT Physical Therapist                            PT Assessment/Plan     Row Name 11/09/18 1000          PT Assessment    Functional Limitations Impaired gait;Impaired locomotion;Limitation in home management;Limitations in community activities;Performance in leisure activities  -KG     Impairments Gait;Impaired flexibility;Impaired muscle endurance;Muscle strength;Pain;Range of motion  -KG     Assessment Comments Pt has progressed very well with PT and has been released from care of MD at this time. Pt reports nearly 100% at this time. Reports having no pain with any daily/functional activities at home. Did well today with all cybex/fitness activities, able to set up machines independently. Good strength/ROM noted in L hip without pain and has met all therapy goals. Good lifting/body mechanics noted with crate lift/carry from floor<>waist. Pt discharged at this time to independent management/HEP and free 30 day fitness membership. Pt will follow up with PT prn with any questions or concerns.  -KG     Rehab Potential Good  -KG     Patient/caregiver participated in establishment of treatment plan and goals Yes  -KG        PT Plan    Predicted Duration of Therapy Intervention (Therapy Eval) Discharged  -KG     PT Plan Comments Pt discharged at this time to independent management/HEP and free 30 day fitness membership. Pt will follow up with PT prn with any questions or concerns.  -KG       User Key  (r) = Recorded By, (t) = Taken By, (c) = Cosigned By    Initials Name Provider Type    Erica Jenkins PT  "Physical Therapist                    Exercises     Row Name 11/09/18 1000             Precautions    Existing Precautions/Restrictions pacemaker  -KG         Subjective Comments    Subjective Comments MD pleased with his progress and released him from his care. States he did quite a bit of lifting with moving AC units yesterday and had no pain. Pt reports he's pretty much back to 100%.  -KG         Subjective Pain    Able to rate subjective pain? yes  -KG      Pre-Treatment Pain Level 0  -KG      Post-Treatment Pain Level 0  -KG         Aquatics    Aquatics performed? No  -KG         Exercise 1    Exercise Name 1 Pro II deferred  -KG         Exercise 2    Exercise Name 2 Standing HS S  -KG      Reps 2 2  -KG      Time 2 30\"  -KG         Exercise 3    Exercise Name 3 Cybex LP2  -KG      Cueing 3 Verbal  -KG      Sets 3 2  -KG      Reps 3 10  -KG      Additional Comments 130#  -KG         Exercise 4    Exercise Name 4 Cybex LP1  -KG      Cueing 4 Verbal  -KG      Sets 4 2  -KG      Reps 4 10  -KG      Additional Comments 85#  -KG         Exercise 5    Exercise Name 5 Cybex hamstring curls  -KG      Cueing 5 Verbal  -KG      Sets 5 2  -KG      Reps 5 10  -KG      Additional Comments 50#  -KG         Exercise 6    Exercise Name 6 Cybex hip abd  -KG      Cueing 6 Verbal  -KG      Sets 6 2  -KG      Reps 6 10  -KG      Additional Comments 45#  -KG         Exercise 7    Exercise Name 7 Reverse BOSU mini squats  -KG      Cueing 7 Verbal  -KG      Sets 7 2  -KG      Reps 7 10  -KG         Exercise 8    Exercise Name 8 BOSU lateral step up/over  -KG      Cueing 8 Verbal  -KG      Sets 8 2  -KG      Reps 8 10  -KG         Exercise 9    Exercise Name 9 CC resisted gait 4-way  -KG      Cueing 9 Verbal  -KG      Sets 9 1  -KG      Reps 9 5 laps  -KG      Additional Comments 5 plates  -KG         Exercise 10    Exercise Name 10 BOSU fwd steps up on riser  -KG      Cueing 10 Verbal  -KG      Sets 10 2  -KG      Reps 10 10  -KG   "       Exercise 11    Exercise Name 11 Airex beam lat sidestepping + squat  -KG      Cueing 11 Verbal  -KG      Sets 11 1  -KG      Reps 11 5 laps  -KG         Exercise 12    Exercise Name 12 Lifting mechanics review/education from floor <> waist with crate carry  -KG      Cueing 12 Verbal  -KG      Time 12 5 min  -KG      Additional Comments 25-30# crate  -KG        User Key  (r) = Recorded By, (t) = Taken By, (c) = Cosigned By    Initials Name Provider Type    KG Erica Negro, PT Physical Therapist                               PT OP Goals     Row Name 11/09/18 1000          PT Short Term Goals    STG Date to Achieve --   All STG's met  -KG     STG 1 Independent/compliant with HEP  -KG     STG 1 Progress Met  -KG     STG 2 Tolerate 45 minute treatment session without increased pain  -KG     STG 2 Progress Met  -KG     STG 3 Demonstrate L hip flex/abd MMT to 4+/5  -KG     STG 3 Progress Met  -KG     STG 4 Demonstrate pain free passive L hip ER/IR ROM  -KG     STG 4 Progress Met  -KG        Long Term Goals    LTG Date to Achieve 11/20/18  -KG     LTG 1 Subjectively report 60% improvement or greater  -KG     LTG 1 Progress Met  -KG     LTG 2 Improve LEFS score to 55/80 or greater  -KG     LTG 2 Progress Met  -KG     LTG 3 Demonstrate L hip flex/abd MMT to 5/5  -KG     LTG 3 Progress Met  -KG     LTG 4 Demonstrate L hip ext MMT to 5/5  -KG     LTG 4 Progress Met  -KG     LTG 5 Demonstrate L hip flex AROM to 115 deg wihtout increased pain  -KG     LTG 5 Progress Met  -KG     LTG 6 Demonstrate ability to perform proper squat from 70-90 deg without increased pain  -KG     LTG 6 Progress Met  -KG        Time Calculation    PT Goal Re-Cert Due Date 11/20/18  -KG       User Key  (r) = Recorded By, (t) = Taken By, (c) = Cosigned By    Initials Name Provider Type    Erica Jenkins, PT Physical Therapist          Therapy Education  Education Details: Reviewed cybex equipment/fitness. Progressed HEP  Given: HEP,  Posture/body mechanics  Program: Reinforced, Progressed  How Provided: Verbal, Demonstration, Written  Provided to: Patient  Level of Understanding: Teach back education performed, Verbalized, Demonstrated              Time Calculation:   Start Time: 1018  Stop Time: 1102  Time Calculation (min): 44 min  Total Timed Code Minutes- PT: 44 minute(s)  Therapy Suggested Charges     Code   Minutes Charges    None           Therapy Charges for Today     Code Description Service Date Service Provider Modifiers Qty    06078029932 HC PT MOBILITY CURRENT 11/9/2018 Erica Negro, PT GP, CH 1    05286980428 HC PT MOBILITY PROJECTED 11/9/2018 Erica Negro, PT GP, CH 1    [id number] HC PT MOBILITY DISCHARGE 11/9/2018 Erica Negro, PT GP,  1    [id number] HC PT THER PROC EA 15 MIN 11/9/2018 Erica Negro, PT GP 3          PT G-Codes  Functional Limitation: Mobility: Walking and moving around  Mobility: Walking and Moving Around Current Status (): 0 percent impaired, limited or restricted  Mobility: Walking and Moving Around Goal Status (): 0 percent impaired, limited or restricted  Mobility: Walking and Moving Around Discharge Status (): 0 percent impaired, limited or restricted     OP PT Discharge Summary  Date of Discharge: 11/09/18  Reason for Discharge: All goals achieved, Maximum functional potential achieved, Independent  Outcomes Achieved: Able to achieve all goals within established timeline  Discharge Destination: Home with home program  Discharge Instructions/Additional Comments: Pt discharged at this time to independent management/HEP and free 30 day fitness membership. Pt will follow up with PT prn with any questions or concerns.      Erica Negro PT  11/9/2018

## 2020-10-23 ENCOUNTER — OFFICE VISIT (OUTPATIENT)
Dept: CARDIOLOGY | Facility: CLINIC | Age: 70
End: 2020-10-23

## 2020-10-23 ENCOUNTER — CLINICAL SUPPORT NO REQUIREMENTS (OUTPATIENT)
Dept: CARDIOLOGY | Facility: CLINIC | Age: 70
End: 2020-10-23

## 2020-10-23 VITALS
OXYGEN SATURATION: 98 % | HEIGHT: 65 IN | HEART RATE: 89 BPM | SYSTOLIC BLOOD PRESSURE: 120 MMHG | DIASTOLIC BLOOD PRESSURE: 70 MMHG | WEIGHT: 196 LBS | BODY MASS INDEX: 32.65 KG/M2

## 2020-10-23 DIAGNOSIS — R00.1 BRADYCARDIA: Primary | ICD-10-CM

## 2020-10-23 DIAGNOSIS — I25.10 CORONARY ARTERY DISEASE INVOLVING NATIVE CORONARY ARTERY OF NATIVE HEART WITHOUT ANGINA PECTORIS: ICD-10-CM

## 2020-10-23 DIAGNOSIS — Z95.0 PRESENCE OF CARDIAC PACEMAKER: ICD-10-CM

## 2020-10-23 DIAGNOSIS — Z95.0 PRESENCE OF CARDIAC PACEMAKER: Primary | ICD-10-CM

## 2020-10-23 DIAGNOSIS — I49.5 SICK SINUS SYNDROME (HCC): ICD-10-CM

## 2020-10-23 DIAGNOSIS — Z98.890: ICD-10-CM

## 2020-10-23 DIAGNOSIS — R06.09 DOE (DYSPNEA ON EXERTION): ICD-10-CM

## 2020-10-23 PROCEDURE — 99204 OFFICE O/P NEW MOD 45 MIN: CPT | Performed by: INTERNAL MEDICINE

## 2020-10-23 PROCEDURE — 93288 INTERROG EVL PM/LDLS PM IP: CPT | Performed by: INTERNAL MEDICINE

## 2020-10-23 RX ORDER — CLOPIDOGREL BISULFATE 75 MG/1
TABLET ORAL
COMMUNITY
Start: 2020-08-26

## 2020-10-23 RX ORDER — OMEGA-3-ACID ETHYL ESTERS 1 G/1
2 CAPSULE, LIQUID FILLED ORAL 2 TIMES DAILY
COMMUNITY
Start: 2020-09-23

## 2020-10-23 RX ORDER — ATORVASTATIN CALCIUM 20 MG/1
TABLET, FILM COATED ORAL
COMMUNITY

## 2020-10-23 RX ORDER — PROPRANOLOL HYDROCHLORIDE 20 MG/1
20 TABLET ORAL 2 TIMES DAILY
COMMUNITY

## 2020-10-23 RX ORDER — ASPIRIN 81 MG/1
TABLET ORAL
COMMUNITY

## 2020-10-23 RX ORDER — ALLOPURINOL 300 MG/1
300 TABLET ORAL 2 TIMES DAILY
COMMUNITY

## 2020-10-23 RX ORDER — NAPROXEN 500 MG/1
500 TABLET ORAL 2 TIMES DAILY WITH MEALS
Status: ON HOLD | COMMUNITY
Start: 2020-07-19 | End: 2022-04-08

## 2020-10-23 RX ORDER — PYRIDOXINE HCL (VITAMIN B6) 100 MG
TABLET ORAL 2 TIMES DAILY
COMMUNITY

## 2020-10-23 NOTE — PROGRESS NOTES
Emerson Sofia  [id number]  1950  70 y.o.  male    Referring Provider: Merly Akbar APRN    Reason for  Visit:  Initial visit for coronary artery disease s/p percutaneous coronary intervention    sick sinus syndrome s/p pacemaker   Wants medications for Erectile dysfunction      Subjective    Mild chronic exertional shortness of breath on exertion relieved with rest  No significant cough or wheezing    No palpitations  No associated chest pain  No significant pedal edema    No fever or chills  No significant expectoration    No hemoptysis  No presyncope or syncope    Tolerating current medications well with no untoward side effects   Compliant with prescribed medication regimen. Tries to adhere to cardiac diet.     Has erectile dysfunction    Device interroagations show no incriminating arrhythmia  Has 5-38 months average 22 months     Weight gain due to dietary indiscretion     History of present illness:  Emerson Sofia is a 70 y.o. yo male with history of coronary artery disease s/p percutaneous coronary intervention    Rota-ablation sick sinus syndrome s/p pacemaker  who presents today for   Chief Complaint   Patient presents with   • Establish Care     NEW PT- MEDTRONIC PACEMAKER/BATTERY REPLACEMENT   .    History  Past Medical History:   Diagnosis Date   • Bradycardia    • High cholesterol    ,   Past Surgical History:   Procedure Laterality Date   • PACEMAKER IMPLANTATION     • ROTATOR CUFF REPAIR Right 10/2017   • TONSILLECTOMY     ,   History reviewed. No pertinent family history.,   Social History     Tobacco Use   • Smoking status: Never Smoker   • Smokeless tobacco: Never Used   Substance Use Topics   • Alcohol use: Never     Frequency: Never   • Drug use: Never   ,     Medications  Current Outpatient Medications   Medication Sig Dispense Refill   • allopurinol (ZYLOPRIM) 300 MG tablet allopurinol 300 mg tablet     • aspirin (aspirin) 81 MG EC tablet Aspir-81 mg tablet,delayed release   Take 1  "tablet every day by oral route.     • atorvastatin (LIPITOR) 20 MG tablet atorvastatin 20 mg tablet     • clopidogrel (PLAVIX) 75 MG tablet      • Cranberry 500 MG capsule cranberry 500 mg capsule   Take 1 capsule every day by oral route as directed.     • naproxen (NAPROSYN) 500 MG tablet      • omega-3 acid ethyl esters (LOVAZA) 1 g capsule      • propranolol (INDERAL) 20 MG tablet propranolol 20 mg tablet       No current facility-administered medications for this visit.        Allergies:  Penicillins    Review of Systems  Review of Systems   Constitution: Negative.   HENT: Negative.    Eyes: Negative.    Cardiovascular: Positive for dyspnea on exertion. Negative for chest pain, claudication, cyanosis, irregular heartbeat, leg swelling, near-syncope, orthopnea, palpitations, paroxysmal nocturnal dyspnea and syncope.   Respiratory: Negative.    Endocrine: Negative.    Hematologic/Lymphatic: Negative.    Skin: Negative.    Musculoskeletal: Positive for arthritis, back pain and joint pain.   Gastrointestinal: Negative for anorexia.   Genitourinary: Positive for decreased libido.   Neurological: Negative.    Psychiatric/Behavioral: Negative.        Objective     Physical Exam:  /70   Pulse 89   Ht 165.1 cm (65\")   Wt 88.9 kg (196 lb)   SpO2 98%   BMI 32.62 kg/m²     Physical Exam  Constitutional:       Appearance: He is well-developed.   HENT:      Head: Normocephalic.   Neck:      Musculoskeletal: No edema.      Vascular: Normal carotid pulses. No carotid bruit or JVD.      Trachea: No tracheal tenderness or tracheal deviation.   Cardiovascular:      Rate and Rhythm: Regular rhythm.      Pulses: Normal pulses.      Heart sounds: Normal heart sounds.   Pulmonary:      Effort: Pulmonary effort is normal.      Breath sounds: No stridor.   Abdominal:      General: There is no distension.      Palpations: Abdomen is soft.      Tenderness: There is no abdominal tenderness.   Skin:     General: Skin is warm. "   Neurological:      Mental Status: He is alert.      Cranial Nerves: No cranial nerve deficit.      Sensory: No sensory deficit.   Psychiatric:         Speech: Speech normal.         Behavior: Behavior normal.         Results Review:           ____________________________________________________________________________________________________________________________________________  Health maintenance and recommendations    Low salt/ HTN/ Heart healthy carbohydrate restricted cardiac diet   The patient is advised to reduce or avoid caffeine or other cardiac stimulants.   Minimize or avoid  NSAID-type medications      Monitor for any signs of bleeding including red or dark stools. Fall precautions.   Advised staying uptodate with immunizations per established standard guidelines.    Offered to give patient  a copy of my notes     Questions were encouraged, asked and answered to the patient's  understanding and satisfaction. Questions if any regarding current medications and side effects, need for refills and importance of compliance to medications stressed.    Reviewed available prior notes, consults, prior visits, laboratory findings, radiology and cardiology relevant reports. Updated chart as applicable. I have reviewed the patient's medical history in detail and updated the computerized patient record as relevant.      Updated patient regarding any new or relevant abnormalities on review of records or any new findings on physical exam. Mentioned to patient about purpose of visit and desirable health short and long term goals and objectives.    Primary to monitor CBC CMP Lipid panel and TSH as applicable    ___________________________________________________________________________________________________________________________________________       Procedures    Assessment/Plan   Diagnoses and all orders for this visit:    1. Bradycardia (Primary)    2. Sick sinus syndrome (CMS/HCC)    3. Presence of cardiac  pacemaker    4. Coronary artery disease involving native coronary artery of native heart without angina pectoris    5. History of coronary rotational ablation 2009 Dr Broadbent     6. GARCIA (dyspnea on exertion)  -     Adult Transthoracic Echo Complete W/ Cont if Necessary Per Protocol; Future        Plan    Monitor cardiac rhythm device function regularly per established schedule or PRN        Orders Placed This Encounter   Procedures   • Adult Transthoracic Echo Complete W/ Cont if Necessary Per Protocol     Myocardial strain to be performed during echocardiogram as long as technically feasible     Standing Status:   Future     Standing Expiration Date:   10/23/2021     Order Specific Question:   Reason for exam?     Answer:   Dyspnea        Keep LDL below 70 mg/dl. Monitor liver and renal functions.   Monitor CBC, CMP, TSH (as indicated) and Lipid Panel by primary     OK to take Viagra or Cialis, primary can prescribe     Device interroagations show no incriminating arrhythmia    Has 5-38 months battery life           Return in about 6 months (around 4/23/2021).

## 2020-11-24 ENCOUNTER — HOSPITAL ENCOUNTER (OUTPATIENT)
Dept: CARDIOLOGY | Facility: HOSPITAL | Age: 70
Discharge: HOME | End: 2020-11-24
Admitting: INTERNAL MEDICINE

## 2020-11-24 VITALS
HEIGHT: 65 IN | BODY MASS INDEX: 32.65 KG/M2 | DIASTOLIC BLOOD PRESSURE: 75 MMHG | SYSTOLIC BLOOD PRESSURE: 152 MMHG | WEIGHT: 195.99 LBS

## 2020-11-24 DIAGNOSIS — R06.09 DOE (DYSPNEA ON EXERTION): ICD-10-CM

## 2020-11-24 PROCEDURE — 93356 MYOCRD STRAIN IMG SPCKL TRCK: CPT

## 2020-11-24 PROCEDURE — 93306 TTE W/DOPPLER COMPLETE: CPT | Performed by: INTERNAL MEDICINE

## 2020-11-24 PROCEDURE — 25010000002 PERFLUTREN 6.52 MG/ML SUSPENSION: Performed by: INTERNAL MEDICINE

## 2020-11-24 PROCEDURE — 93306 TTE W/DOPPLER COMPLETE: CPT

## 2020-11-24 PROCEDURE — 93356 MYOCRD STRAIN IMG SPCKL TRCK: CPT | Performed by: INTERNAL MEDICINE

## 2020-11-24 RX ADMIN — PERFLUTREN 8.48 MG: 6.52 INJECTION, SUSPENSION INTRAVENOUS at 14:23

## 2020-11-26 LAB
BH CV ECHO MEAS - AO MAX PG (FULL): 2.8 MMHG
BH CV ECHO MEAS - AO MAX PG: 4.7 MMHG
BH CV ECHO MEAS - AO MEAN PG (FULL): 2 MMHG
BH CV ECHO MEAS - AO MEAN PG: 3 MMHG
BH CV ECHO MEAS - AO ROOT AREA (BSA CORRECTED): 2
BH CV ECHO MEAS - AO ROOT AREA: 11.9 CM^2
BH CV ECHO MEAS - AO ROOT DIAM: 3.9 CM
BH CV ECHO MEAS - AO V2 MAX: 108 CM/SEC
BH CV ECHO MEAS - AO V2 MEAN: 74.5 CM/SEC
BH CV ECHO MEAS - AO V2 VTI: 20.7 CM
BH CV ECHO MEAS - AVA(I,A): 2.8 CM^2
BH CV ECHO MEAS - AVA(I,D): 2.8 CM^2
BH CV ECHO MEAS - AVA(V,A): 2.9 CM^2
BH CV ECHO MEAS - AVA(V,D): 2.9 CM^2
BH CV ECHO MEAS - BSA(HAYCOCK): 2.1 M^2
BH CV ECHO MEAS - BSA: 2 M^2
BH CV ECHO MEAS - BZI_BMI: 32.6 KILOGRAMS/M^2
BH CV ECHO MEAS - BZI_METRIC_HEIGHT: 165.1 CM
BH CV ECHO MEAS - BZI_METRIC_WEIGHT: 88.9 KG
BH CV ECHO MEAS - EDV(CUBED): 89.9 ML
BH CV ECHO MEAS - EDV(MOD-SP4): 140 ML
BH CV ECHO MEAS - EDV(TEICH): 91.5 ML
BH CV ECHO MEAS - EF(CUBED): 62.9 %
BH CV ECHO MEAS - EF(MOD-SP4): 54.3 %
BH CV ECHO MEAS - EF(TEICH): 54.5 %
BH CV ECHO MEAS - ESV(CUBED): 33.4 ML
BH CV ECHO MEAS - ESV(MOD-SP4): 64 ML
BH CV ECHO MEAS - ESV(TEICH): 41.6 ML
BH CV ECHO MEAS - FS: 28.1 %
BH CV ECHO MEAS - IVS/LVPW: 1.1
BH CV ECHO MEAS - IVSD: 1.3 CM
BH CV ECHO MEAS - LA DIMENSION: 4.2 CM
BH CV ECHO MEAS - LA/AO: 1.1
BH CV ECHO MEAS - LAT PEAK E' VEL: 6.7 CM/SEC
BH CV ECHO MEAS - LV DIASTOLIC VOL/BSA (35-75): 71.4 ML/M^2
BH CV ECHO MEAS - LV MASS(C)D: 201.5 GRAMS
BH CV ECHO MEAS - LV MASS(C)DI: 102.7 GRAMS/M^2
BH CV ECHO MEAS - LV MAX PG: 1.9 MMHG
BH CV ECHO MEAS - LV MEAN PG: 1 MMHG
BH CV ECHO MEAS - LV SYSTOLIC VOL/BSA (12-30): 32.6 ML/M^2
BH CV ECHO MEAS - LV V1 MAX: 68.7 CM/SEC
BH CV ECHO MEAS - LV V1 MEAN: 49.2 CM/SEC
BH CV ECHO MEAS - LV V1 VTI: 12.7 CM
BH CV ECHO MEAS - LVIDD: 4.5 CM
BH CV ECHO MEAS - LVIDS: 3.2 CM
BH CV ECHO MEAS - LVLD AP4: 8.6 CM
BH CV ECHO MEAS - LVLS AP4: 7.2 CM
BH CV ECHO MEAS - LVOT AREA (M): 4.5 CM^2
BH CV ECHO MEAS - LVOT AREA: 4.5 CM^2
BH CV ECHO MEAS - LVOT DIAM: 2.4 CM
BH CV ECHO MEAS - LVPWD: 1.2 CM
BH CV ECHO MEAS - MED PEAK E' VEL: 6.96 CM/SEC
BH CV ECHO MEAS - MV A MAX VEL: 74.7 CM/SEC
BH CV ECHO MEAS - MV DEC SLOPE: 157 CM/SEC^2
BH CV ECHO MEAS - MV DEC TIME: 0.35 SEC
BH CV ECHO MEAS - MV E MAX VEL: 54.3 CM/SEC
BH CV ECHO MEAS - MV E/A: 0.73
BH CV ECHO MEAS - RAP SYSTOLE: 5 MMHG
BH CV ECHO MEAS - RVSP: 24.5 MMHG
BH CV ECHO MEAS - SI(AO): 126.1 ML/M^2
BH CV ECHO MEAS - SI(CUBED): 28.8 ML/M^2
BH CV ECHO MEAS - SI(LVOT): 29.3 ML/M^2
BH CV ECHO MEAS - SI(MOD-SP4): 38.8 ML/M^2
BH CV ECHO MEAS - SI(TEICH): 25.4 ML/M^2
BH CV ECHO MEAS - SV(AO): 247.3 ML
BH CV ECHO MEAS - SV(CUBED): 56.5 ML
BH CV ECHO MEAS - SV(LVOT): 57.5 ML
BH CV ECHO MEAS - SV(MOD-SP4): 76 ML
BH CV ECHO MEAS - SV(TEICH): 49.9 ML
BH CV ECHO MEAS - TR MAX VEL: 221 CM/SEC
BH CV ECHO MEASUREMENTS AVERAGE E/E' RATIO: 7.95
LEFT ATRIUM VOLUME INDEX: 22.7 ML/M2
LEFT ATRIUM VOLUME: 44.4 CM3
MAXIMAL PREDICTED HEART RATE: 151 BPM
STRESS TARGET HR: 128 BPM

## 2020-12-02 NOTE — PROGRESS NOTES
Dual Chamber Pacemaker Evaluation Report  IN OFFICE INTERROGATION    October 23, 2020    Primary Cardiologist: Shalonda  : Medtronic Model: Adapta [id number]  Implant date: 10/26/2009    Reason for evaluation: routine  Indication for pacemaker: sick sinus syndrome    Measurements  Atrial sensing - P wave: PACED mV  Atrial threshold: 0.625V@ 0.4ms  Atrial lead impedance: 532 ohms  Ventricular sensing - R wave: 11.2-22.4 mV  Ventricular threshold: 0.875 V @ 0.4 ms  Ventricular lead impedance:   601 ohms     Diagnostic Data  Atrial paced: 98.2 %  Ventricular paced: 2 %  Other: No episodes.  Battery status: satisfactory   Estimated 22 months remaining      Final Parameters  Mode:  AAIR+  Lower rate: 60 bpm   Upper rate: 130 bpm  AV Delay: paced- 160 ms  Sensed-140 ms  Atrial - Amplitude: 1.5 V   Pulse width: 0.4 ms   Sensitivity: 0.5 mV     Ventricular - Amplitude: 2 V  Pulse width: 0.4 ms  Sensitivity: 5.6 mV    Changes made: No changes made  Conclusions: normal pacemaker function, stable pacing and sensing thresholds and adequate battery reserve    Follow up: 6 months via [facility], annually in office

## 2020-12-12 NOTE — PROGRESS NOTES
I have reviewed the notes, assessments, and/or procedures performed by  Rosey Reese RN, I concur with her  documentation of Emerson Sofia.

## 2021-04-30 ENCOUNTER — OFFICE VISIT (OUTPATIENT)
Dept: CARDIOLOGY | Facility: CLINIC | Age: 71
End: 2021-04-30

## 2021-04-30 VITALS
OXYGEN SATURATION: 98 % | HEIGHT: 66 IN | WEIGHT: 191 LBS | BODY MASS INDEX: 30.7 KG/M2 | SYSTOLIC BLOOD PRESSURE: 140 MMHG | DIASTOLIC BLOOD PRESSURE: 75 MMHG | HEART RATE: 86 BPM

## 2021-04-30 DIAGNOSIS — I25.10 CORONARY ARTERY DISEASE INVOLVING NATIVE CORONARY ARTERY OF NATIVE HEART WITHOUT ANGINA PECTORIS: ICD-10-CM

## 2021-04-30 DIAGNOSIS — I49.5 SICK SINUS SYNDROME (HCC): Primary | ICD-10-CM

## 2021-04-30 DIAGNOSIS — Z95.0 PRESENCE OF CARDIAC PACEMAKER: ICD-10-CM

## 2021-04-30 DIAGNOSIS — R00.1 BRADYCARDIA: ICD-10-CM

## 2021-04-30 DIAGNOSIS — Z98.890: ICD-10-CM

## 2021-04-30 DIAGNOSIS — N52.9 ERECTILE DYSFUNCTION, UNSPECIFIED ERECTILE DYSFUNCTION TYPE: ICD-10-CM

## 2021-04-30 PROCEDURE — 93000 ELECTROCARDIOGRAM COMPLETE: CPT | Performed by: INTERNAL MEDICINE

## 2021-04-30 PROCEDURE — 99214 OFFICE O/P EST MOD 30 MIN: CPT | Performed by: INTERNAL MEDICINE

## 2021-04-30 NOTE — PROGRESS NOTES
Emerson Sofia  [id number]  1950  71 y.o.  male    Referring Provider: Merly Akbar APRN    Reason for  Visit:      Here for routine follow up   Initial visit for coronary artery disease s/p percutaneous coronary intervention    sick sinus syndrome s/p pacemaker   Wanted medications for Erectile dysfunction  last visit  Cardiac workup test results as below: echo,     Subjective      Overall feels the same   No new events or complaints since last visit   Overall the patient feels no major change from baseline symptoms   Similar symptoms as during last visit     Mild chronic exertional shortness of breath on exertion relieved with rest  No significant cough or wheezing    Overall the patient feels no major change from baseline symptoms     No palpitations  No associated chest pain  No significant pedal edema    No fever or chills  No significant expectoration    No hemoptysis  No presyncope or syncope  Has erectile dysfunction    Device interroagations showed no incriminating arrhythmia    Had 5-38 months average 22 months last visit     History of present illness:  Emerson Sofia is a 71 y.o. yo male with history of coronary artery disease s/p percutaneous coronary intervention    Rota-ablation sick sinus syndrome s/p pacemaker  who presents today for   Chief Complaint   Patient presents with   • Slow Heart Rate     6 mo f/u   .    History  Past Medical History:   Diagnosis Date   • Bradycardia    • High cholesterol    ,   Past Surgical History:   Procedure Laterality Date   • PACEMAKER IMPLANTATION     • ROTATOR CUFF REPAIR Right 10/2017   • TONSILLECTOMY     ,   History reviewed. No pertinent family history.,   Social History     Tobacco Use   • Smoking status: Never Smoker   • Smokeless tobacco: Never Used   Substance Use Topics   • Alcohol use: Never   • Drug use: Never   ,     Medications  Current Outpatient Medications   Medication Sig Dispense Refill   • allopurinol (ZYLOPRIM) 300 MG tablet Take 300  "mg by mouth 2 (Two) Times a Day.     • aspirin (aspirin) 81 MG EC tablet Aspir-81 mg tablet,delayed release   Take 1 tablet every day by oral route.     • atorvastatin (LIPITOR) 20 MG tablet atorvastatin 20 mg tablet     • clopidogrel (PLAVIX) 75 MG tablet      • Cranberry 500 MG capsule cranberry 500 mg capsule   Take 1 capsule every day by oral route as directed.     • naproxen (NAPROSYN) 500 MG tablet Take 500 mg by mouth 2 (Two) Times a Day With Meals.     • omega-3 acid ethyl esters (LOVAZA) 1 g capsule      • propranolol (INDERAL) 20 MG tablet Take 20 mg by mouth 2 (Two) Times a Day.       No current facility-administered medications for this visit.       Allergies:  Penicillins    Review of Systems  Review of Systems   Constitutional: Negative.   HENT: Negative.    Eyes: Negative.    Cardiovascular: Positive for dyspnea on exertion. Negative for chest pain, claudication, cyanosis, irregular heartbeat, leg swelling, near-syncope, orthopnea, palpitations, paroxysmal nocturnal dyspnea and syncope.   Respiratory: Negative.    Endocrine: Negative.    Hematologic/Lymphatic: Negative.    Skin: Negative.    Musculoskeletal: Positive for arthritis, back pain and joint pain.   Gastrointestinal: Negative for anorexia.   Genitourinary: Positive for decreased libido.   Neurological: Negative.    Psychiatric/Behavioral: Negative.        Objective     Physical Exam:  /75   Pulse 86   Ht 167.6 cm (66\")   Wt 86.6 kg (191 lb)   SpO2 98%   BMI 30.83 kg/m²     Physical Exam  Constitutional:       Appearance: He is well-developed.   HENT:      Head: Normocephalic.   Neck:      Vascular: Normal carotid pulses. No carotid bruit or JVD.      Trachea: No tracheal tenderness or tracheal deviation.   Cardiovascular:      Rate and Rhythm: Regular rhythm.      Pulses: Normal pulses.      Heart sounds: Normal heart sounds.   Pulmonary:      Effort: Pulmonary effort is normal.      Breath sounds: No stridor.   Abdominal:      " General: There is no distension.      Palpations: Abdomen is soft.      Tenderness: There is no abdominal tenderness.   Musculoskeletal:      Cervical back: No edema.   Skin:     General: Skin is warm.   Neurological:      Mental Status: He is alert.      Cranial Nerves: No cranial nerve deficit.      Sensory: No sensory deficit.   Psychiatric:         Speech: Speech normal.         Behavior: Behavior normal.         Results Review:    Results for orders placed during the hospital encounter of 11/24/20    Adult Transthoracic Echo Complete W/ Cont if Necessary Per Protocol    Interpretation Summary  · Left ventricular ejection fraction appears to be 56 - 60%. Left ventricular systolic function is normal.  · Left ventricular systolic function is normal. Abnormal global longitudinal LV strain (GLS) = -9%  · Left ventricular diastolic function was normal.  · No evidence of pulmonary hypertension is present.          ____________________________________________________________________________________________________________________________________________  Health maintenance and recommendations    Low salt/ HTN/ Heart healthy carbohydrate restricted cardiac diet   The patient is advised to reduce or avoid caffeine or other cardiac stimulants.   Minimize or avoid  NSAID-type medications      Monitor for any signs of bleeding including red or dark stools. Fall precautions.   Advised staying uptodate with immunizations per established standard guidelines.    Offered to give patient  a copy of my notes     Questions were encouraged, asked and answered to the patient's  understanding and satisfaction. Questions if any regarding current medications and side effects, need for refills and importance of compliance to medications stressed.    Reviewed available prior notes, consults, prior visits, laboratory findings, radiology and cardiology relevant reports. Updated chart as applicable. I have reviewed the patient's medical  history in detail and updated the computerized patient record as relevant.      Updated patient regarding any new or relevant abnormalities on review of records or any new findings on physical exam. Mentioned to patient about purpose of visit and desirable health short and long term goals and objectives.    Primary to monitor CBC CMP Lipid panel and TSH as applicable    ___________________________________________________________________________________________________________________________________________         ECG 12 Lead    Date/Time: 4/30/2021 9:49 AM  Performed by: Jethro Liz MD  Authorized by: Jethro Liz MD   Comparison: not compared with previous ECG   Rhythm: paced  Rhythm comments: atrial paced   Rate: normal  Conduction: conduction normal  ST Segments: ST segments normal  QRS axis: normal  Other: no other findings    Clinical impression: abnormal EKG            Assessment/Plan   Diagnoses and all orders for this visit:    1. Sick sinus syndrome (CMS/HCC) (Primary)    2. Presence of cardiac pacemaker    3. History of coronary rotational ablation 2009 Dr Broadbent     4. Coronary artery disease involving native coronary artery of native heart without angina pectoris    5. Bradycardia    6. Erectile dysfunction, unspecified erectile dysfunction type  -     Ambulatory Referral to Urology        Plan      Patient expressed understanding  Encouraged and answered all questions   Discussed with the patient and all questioned fully answered. He will call me if any problems arise.   Discussed results of prior testing with patient : echo   as well electrocardiogram from today       Monitor cardiac rhythm device function regularly per established schedule or PRN       Keep LDL below 70 mg/dl. Monitor liver and renal functions.   Monitor CBC, CMP, TSH (as indicated) and Lipid Panel by primary     OK to take Viagra or Cialis, primary does not desire to prescribe   Orders Placed This Encounter   Procedures   •  Ambulatory Referral to Urology     Referral Priority:   Routine     Referral Type:   Consultation     Referral Reason:   Specialty Services Required     Requested Specialty:   Urology     Number of Visits Requested:   1        Device interroagations showed no incriminating arrhythmia    Has 5-38 months battery life lat visit repeat testing pending     Overall doing well no new cardiovascular symptoms and therefore no additional cardiac testing is required   If any interim issues arise will call me for further evaluation.     I support the patient's decision to take the Covid -19 vaccine   Had both dose   No major issues   Now fully immunized      Follow up with ROWDY Morrison or any mid level provider working with me to address interim issues           Return in about 6 months (around 10/30/2021).

## 2021-05-04 NOTE — PROGRESS NOTES
Subjective    Mr. Sofia is 71 y.o. male    Chief Complaint: New Patient / ED    History of Present Illness  Erectile Dysfunction  Patient complains of erectile dysfunction. Onset of dysfunction was a few years ago and was gradual in onset.  Patient states the nature of difficulty is both attaining and maintaining erection. Full erections occur never. Partial erections occur with intercourse. Libido is not affected. Risk factors for ED include beta blocker use. Patient denies history of hypogonadism.  Previous treatment of ED includes none.  Patient denies any  trauma or surgery.    The following portions of the patient's history were reviewed and updated as appropriate: allergies, current medications, past family history, past medical history, past social history, past surgical history and problem list.    Review of Systems   Constitutional: Negative for appetite change and fever.   HENT: Negative for hearing loss and sore throat.    Eyes: Negative for pain and redness.   Respiratory: Negative for cough and shortness of breath.    Cardiovascular: Negative for chest pain and leg swelling.   Gastrointestinal: Negative for anal bleeding, nausea and vomiting.   Endocrine: Negative for cold intolerance and heat intolerance.   Genitourinary: Negative for dysuria, flank pain, frequency, hematuria and urgency.   Musculoskeletal: Negative for joint swelling and myalgias.   Skin: Negative for color change and rash.   Allergic/Immunologic: Negative for immunocompromised state.   Neurological: Negative for dizziness and speech difficulty.   Hematological: Negative for adenopathy. Does not bruise/bleed easily.   Psychiatric/Behavioral: Negative for dysphoric mood and suicidal ideas.         Current Outpatient Medications:   •  allopurinol (ZYLOPRIM) 300 MG tablet, Take 300 mg by mouth 2 (Two) Times a Day., Disp: , Rfl:   •  aspirin (aspirin) 81 MG EC tablet, Aspir-81 mg tablet,delayed release  Take 1 tablet every day by oral  "route., Disp: , Rfl:   •  atorvastatin (LIPITOR) 20 MG tablet, atorvastatin 20 mg tablet, Disp: , Rfl:   •  clopidogrel (PLAVIX) 75 MG tablet, , Disp: , Rfl:   •  Cranberry 500 MG capsule, cranberry 500 mg capsule  Take 1 capsule every day by oral route as directed., Disp: , Rfl:   •  naproxen (NAPROSYN) 500 MG tablet, Take 500 mg by mouth 2 (Two) Times a Day With Meals., Disp: , Rfl:   •  omega-3 acid ethyl esters (LOVAZA) 1 g capsule, , Disp: , Rfl:   •  propranolol (INDERAL) 20 MG tablet, Take 20 mg by mouth 2 (Two) Times a Day., Disp: , Rfl:   •  sildenafil (VIAGRA) 100 MG tablet, Take 1 tablet by mouth As Needed for Erectile Dysfunction (1-4 Hours before activity) for up to 4 doses., Disp: 4 tablet, Rfl: 0    Past Medical History:   Diagnosis Date   • Bradycardia    • High cholesterol        Past Surgical History:   Procedure Laterality Date   • PACEMAKER IMPLANTATION     • ROTATOR CUFF REPAIR Right 10/2017   • TONSILLECTOMY         Social History     Socioeconomic History   • Marital status:      Spouse name: Not on file   • Number of children: Not on file   • Years of education: Not on file   • Highest education level: Not on file   Tobacco Use   • Smoking status: Never Smoker   • Smokeless tobacco: Never Used   Vaping Use   • Vaping Use: Never used   Substance and Sexual Activity   • Alcohol use: Yes     Comment: occ   • Drug use: Never   • Sexual activity: Defer       History reviewed. No pertinent family history.    Objective    Temp 97.2 °F (36.2 °C) (Temporal)   Ht 167.6 cm (66\")   Wt 86.6 kg (191 lb)   BMI 30.83 kg/m²     Physical Exam  Vitals reviewed.   Constitutional:       Appearance: Normal appearance.   HENT:      Head: Normocephalic and atraumatic.      Right Ear: External ear normal.      Left Ear: External ear normal.      Nose: No congestion.   Eyes:      Conjunctiva/sclera: Conjunctivae normal.   Neck:      Comments: I observed no obvious neck masses  Pulmonary:      Effort: " Pulmonary effort is normal.   Skin:     Coloration: Skin is not pale.      Findings: No rash.   Neurological:      General: No focal deficit present.      Mental Status: He is alert and oriented to person, place, and time.   Psychiatric:         Mood and Affect: Mood normal.         Behavior: Behavior normal.             Results for orders placed or performed in visit on 05/07/21   POC Urinalysis Dipstick, Multipro    Specimen: Urine   Result Value Ref Range    Color Yellow Yellow, Straw, Dark Yellow, [place]    Clarity, UA Clear Clear    Glucose, UA Negative Negative, 1000 mg/dL (3+) mg/dL    Bilirubin Negative Negative    Ketones, UA Negative Negative    Specific Gravity  1.025 1.005 - 1.030    Blood, UA Negative Negative    pH, Urine 5.5 5.0 - 8.0    Protein, POC Negative Negative mg/dL    Urobilinogen, UA Normal Normal    Nitrite, UA Negative Negative    Leukocytes Negative Negative     Assessment and Plan    Diagnoses and all orders for this visit:    1. Erectile dysfunction, unspecified erectile dysfunction type (Primary)  -     POC Urinalysis Dipstick, Multipro  -     sildenafil (VIAGRA) 100 MG tablet; Take 1 tablet by mouth As Needed for Erectile Dysfunction (1-4 Hours before activity) for up to 4 doses.  Dispense: 4 tablet; Refill: 0    Patient with erectile dysfunction we discussed erectile dysfunction treatment options to include vacuum erectile device, PDE five inhibitors, penile injections, vacuum erectile device.  After discussion patient will try 100 mg sildenafil.  Patient denies taking any short or long-acting nitrates.  I discussed side effects to include facial flushing nasal stuffiness frontal headache reflux and if he should have an erection lasting for hours he needs to come to this emergency department for treatment.

## 2021-05-07 ENCOUNTER — OFFICE VISIT (OUTPATIENT)
Dept: UROLOGY | Facility: CLINIC | Age: 71
End: 2021-05-07

## 2021-05-07 VITALS — BODY MASS INDEX: 30.7 KG/M2 | TEMPERATURE: 97.2 F | HEIGHT: 66 IN | WEIGHT: 191 LBS

## 2021-05-07 DIAGNOSIS — N52.9 ERECTILE DYSFUNCTION, UNSPECIFIED ERECTILE DYSFUNCTION TYPE: Primary | ICD-10-CM

## 2021-05-07 LAB
BILIRUB BLD-MCNC: NEGATIVE MG/DL
CLARITY, POC: CLEAR
COLOR UR: YELLOW
GLUCOSE UR STRIP-MCNC: NEGATIVE MG/DL
KETONES UR QL: NEGATIVE
LEUKOCYTE EST, POC: NEGATIVE
NITRITE UR-MCNC: NEGATIVE MG/ML
PH UR: 5.5 [PH] (ref 5–8)
PROT UR STRIP-MCNC: NEGATIVE MG/DL
RBC # UR STRIP: NEGATIVE /UL
SP GR UR: 1.02 (ref 1–1.03)
UROBILINOGEN UR QL: NORMAL

## 2021-05-07 PROCEDURE — 99202 OFFICE O/P NEW SF 15 MIN: CPT | Performed by: PHYSICIAN ASSISTANT

## 2021-05-07 PROCEDURE — 81003 URINALYSIS AUTO W/O SCOPE: CPT | Performed by: PHYSICIAN ASSISTANT

## 2021-05-07 RX ORDER — SILDENAFIL 100 MG/1
100 TABLET, FILM COATED ORAL AS NEEDED
Qty: 4 TABLET | Refills: 1 | Status: SHIPPED | OUTPATIENT
Start: 2021-05-07 | End: 2021-08-25 | Stop reason: SDUPTHER

## 2021-05-07 RX ORDER — SILDENAFIL 100 MG/1
100 TABLET, FILM COATED ORAL AS NEEDED
Qty: 4 TABLET | Refills: 0 | Status: SHIPPED | OUTPATIENT
Start: 2021-05-07 | End: 2021-08-25 | Stop reason: SDUPTHER

## 2021-08-18 NOTE — PROGRESS NOTES
Subjective    Mr. Sofia is 71 y.o. male    Chief Complaint: Erectile Dysfunction    History of Present Illness  Elevated PSA  Patient is here with an elevated PSA. The PSA was drawn3 week(s). He does not have a family history of prostate cancer.Voiding symptoms include Frequency and Urgency. Denies Straining and Hematuria. Voiding symptoms began a few months  . These have been gradual in onset. none  Previous PSA values are :     PSA      4.8        08/03/2021  No results found for: PSA       The following portions of the patient's history were reviewed and updated as appropriate: allergies, current medications, past family history, past medical history, past social history, past surgical history and problem list.    Review of Systems   Constitutional: Negative for chills and fever.   Gastrointestinal: Negative for abdominal pain, anal bleeding and blood in stool.   Genitourinary: Negative for dysuria and hematuria.         Current Outpatient Medications:   •  allopurinol (ZYLOPRIM) 300 MG tablet, Take 300 mg by mouth 2 (Two) Times a Day., Disp: , Rfl:   •  aspirin (aspirin) 81 MG EC tablet, Aspir-81 mg tablet,delayed release  Take 1 tablet every day by oral route., Disp: , Rfl:   •  atorvastatin (LIPITOR) 20 MG tablet, atorvastatin 20 mg tablet, Disp: , Rfl:   •  clopidogrel (PLAVIX) 75 MG tablet, , Disp: , Rfl:   •  Cranberry 500 MG capsule, cranberry 500 mg capsule  Take 1 capsule every day by oral route as directed., Disp: , Rfl:   •  naproxen (NAPROSYN) 500 MG tablet, Take 500 mg by mouth 2 (Two) Times a Day With Meals., Disp: , Rfl:   •  omega-3 acid ethyl esters (LOVAZA) 1 g capsule, , Disp: , Rfl:   •  propranolol (INDERAL) 20 MG tablet, Take 20 mg by mouth 2 (Two) Times a Day., Disp: , Rfl:   •  sildenafil (VIAGRA) 100 MG tablet, Take 1 tablet by mouth As Needed for Erectile Dysfunction (1-4 Hours before activity) for up to 4 doses., Disp: 4 tablet, Rfl: 0  •  sildenafil (Viagra) 100 MG tablet, Take 1  "tablet by mouth As Needed for Erectile Dysfunction for up to 8 doses., Disp: 4 tablet, Rfl: 1    Past Medical History:   Diagnosis Date   • Bradycardia    • High cholesterol        Past Surgical History:   Procedure Laterality Date   • PACEMAKER IMPLANTATION     • ROTATOR CUFF REPAIR Right 10/2017   • TONSILLECTOMY         Social History     Socioeconomic History   • Marital status:      Spouse name: Not on file   • Number of children: Not on file   • Years of education: Not on file   • Highest education level: Not on file   Tobacco Use   • Smoking status: Never Smoker   • Smokeless tobacco: Never Used   Vaping Use   • Vaping Use: Never used   Substance and Sexual Activity   • Alcohol use: Yes     Comment: occ   • Drug use: Never   • Sexual activity: Defer       History reviewed. No pertinent family history.    Objective    Temp 96.5 °F (35.8 °C)   Ht 167.6 cm (66\")   Wt 81.6 kg (180 lb)   BMI 29.05 kg/m²     Physical Exam        Results for orders placed or performed in visit on 08/24/21   POC Urinalysis Dipstick, Multipro    Specimen: Urine   Result Value Ref Range    Color Yellow Yellow, Straw, Dark Yellow, [place]    Clarity, UA Clear Clear    Glucose, UA Negative Negative, 1000 mg/dL (3+) mg/dL    Bilirubin Negative Negative    Ketones, UA Negative Negative    Specific Gravity  1.020 1.005 - 1.030    Blood, UA Negative Negative    pH, Urine 6.0 5.0 - 8.0    Protein, POC Trace (A) Negative mg/dL    Urobilinogen, UA 1 E.U./dL  (A) Normal    Nitrite, UA Negative Negative    Leukocytes Negative Negative     Assessment and Plan    Diagnoses and all orders for this visit:    1. Erectile dysfunction, unspecified erectile dysfunction type (Primary)  -     POC Urinalysis Dipstick, Multipro    2. Elevated prostate specific antigen (PSA)  -     PSA DIAGNOSTIC; Future    His PSA 4.8. No previous PSA available for review. KLEBER was benign feeling. Follow up in 3 months with PSA.    Patient states he had partial " response he states he took Cialis.  Sildenafil 100 mg with patient states no it was Cialis.  We will need to contact [facility] to verify what prescription he did fill.

## 2021-08-24 ENCOUNTER — OFFICE VISIT (OUTPATIENT)
Dept: UROLOGY | Facility: CLINIC | Age: 71
End: 2021-08-24

## 2021-08-24 VITALS — BODY MASS INDEX: 28.93 KG/M2 | TEMPERATURE: 96.5 F | WEIGHT: 180 LBS | HEIGHT: 66 IN

## 2021-08-24 DIAGNOSIS — R97.20 ELEVATED PROSTATE SPECIFIC ANTIGEN (PSA): ICD-10-CM

## 2021-08-24 DIAGNOSIS — N52.9 ERECTILE DYSFUNCTION, UNSPECIFIED ERECTILE DYSFUNCTION TYPE: Primary | ICD-10-CM

## 2021-08-24 LAB
BILIRUB BLD-MCNC: NEGATIVE MG/DL
CLARITY, POC: CLEAR
COLOR UR: YELLOW
GLUCOSE UR STRIP-MCNC: NEGATIVE MG/DL
KETONES UR QL: NEGATIVE
LEUKOCYTE EST, POC: NEGATIVE
NITRITE UR-MCNC: NEGATIVE MG/ML
PH UR: 6 [PH] (ref 5–8)
PROT UR STRIP-MCNC: ABNORMAL MG/DL
RBC # UR STRIP: NEGATIVE /UL
SP GR UR: 1.02 (ref 1–1.03)
UROBILINOGEN UR QL: ABNORMAL

## 2021-08-24 PROCEDURE — 81001 URINALYSIS AUTO W/SCOPE: CPT | Performed by: PHYSICIAN ASSISTANT

## 2021-08-24 PROCEDURE — 99213 OFFICE O/P EST LOW 20 MIN: CPT | Performed by: PHYSICIAN ASSISTANT

## 2021-08-25 ENCOUNTER — TELEPHONE (OUTPATIENT)
Dept: UROLOGY | Facility: CLINIC | Age: 71
End: 2021-08-25

## 2021-08-25 DIAGNOSIS — N52.9 ERECTILE DYSFUNCTION, UNSPECIFIED ERECTILE DYSFUNCTION TYPE: ICD-10-CM

## 2021-08-25 RX ORDER — SILDENAFIL 100 MG/1
100 TABLET, FILM COATED ORAL AS NEEDED
Qty: 10 TABLET | Refills: 11 | Status: SHIPPED | OUTPATIENT
Start: 2021-08-25 | End: 2022-10-24

## 2021-08-25 NOTE — TELEPHONE ENCOUNTER
Patient called and notified that we have been prescribing him sildenafil. We have never prescribed tadalafil. Patient verbalized understanding and asked if we could send a refill to [facility]. I asked Compa and he said we could refill his RX Q10 Refill 11. RX sent to [facility].

## 2021-08-25 NOTE — TELEPHONE ENCOUNTER
----- Message from CHARLOTTE Gore sent at 8/24/2021  3:32 PM CDT -----  Regarding: ED medication  Could you contact his pharmacy and verify what prescription is sent that shows that I sent 100 mg sildenafil to his pharmacy but patient stated today at his visit that no it was Cialis. If you could call just verify this before I call in another prescription thanks.

## 2021-10-29 DIAGNOSIS — R97.20 ELEVATED PROSTATE SPECIFIC ANTIGEN (PSA): ICD-10-CM

## 2021-11-02 PROBLEM — E78.5 HYPERLIPIDEMIA LDL GOAL <70: Chronic | Status: ACTIVE | Noted: 2021-11-02

## 2021-11-02 PROBLEM — Z98.890: Chronic | Status: ACTIVE | Noted: 2020-10-23

## 2021-11-02 PROBLEM — I25.10 CORONARY ARTERY DISEASE INVOLVING NATIVE CORONARY ARTERY WITHOUT ANGINA PECTORIS: Chronic | Status: ACTIVE | Noted: 2020-10-23

## 2021-11-02 PROBLEM — Z95.0 PRESENCE OF CARDIAC PACEMAKER: Chronic | Status: ACTIVE | Noted: 2020-10-23

## 2021-11-02 PROBLEM — I49.5 SICK SINUS SYNDROME (HCC): Chronic | Status: ACTIVE | Noted: 2020-10-23

## 2021-11-02 PROBLEM — I10 PRIMARY HYPERTENSION: Status: ACTIVE | Noted: 2021-11-02

## 2021-11-02 PROBLEM — E78.5 HYPERLIPIDEMIA LDL GOAL <70: Status: ACTIVE | Noted: 2021-11-02

## 2021-11-02 PROBLEM — I10 PRIMARY HYPERTENSION: Chronic | Status: ACTIVE | Noted: 2021-11-02

## 2021-11-02 NOTE — PROGRESS NOTES
Chief Complaint  Coronary Artery Disease (6mo F/U. No chest pain, palpitaitons, or edema. ), SSS, and Shortness of Breath (Ongoing. )    Subjective          Emerson Sofia presents to [facility] for routine follow-up.  He has coronary artery disease status post rotational ablation 2009 per Dr. John Broadbent, sick sinus syndrome status post pacemaker, hypertension and hyperlipidemia. He reports chronic dyspnea with moderate exertion, which he contributes to deconditioning and denies worsening. Patient denies chest pain, palpitations, dizziness, syncope, orthopnea, PND, edema or decreased stamina.  Patient denies any signs of bleeding.    Coronary Artery Disease  Presents for follow-up visit. Pertinent negatives include no chest pain, chest pressure, chest tightness, dizziness, leg swelling, muscle weakness, palpitations, shortness of breath or weight gain. Risk factors include hyperlipidemia. The symptoms have been stable. Compliance with diet is variable. Compliance with exercise is variable. Compliance with medications is good.   Hypertension  This is a chronic problem. The current episode started more than 1 year ago. The problem is controlled. Pertinent negatives include no anxiety, blurred vision, chest pain, headaches, malaise/fatigue, neck pain, orthopnea, palpitations, peripheral edema, PND, shortness of breath or sweats. Risk factors for coronary artery disease include male gender and dyslipidemia. Current antihypertension treatment includes beta blockers. The current treatment provides significant improvement. Hypertensive end-organ damage includes CAD/MI.   Hyperlipidemia  This is a chronic problem. The current episode started more than 1 year ago. Pertinent negatives include no chest pain or shortness of breath. Current antihyperlipidemic treatment includes statins. Risk factors for coronary artery disease include male sex, hypertension and dyslipidemia.       Objective  "  Vital Signs:   /78   Pulse 61   Ht 167.6 cm (66\")   Wt 84.4 kg (186 lb)   SpO2 98%   BMI 30.02 kg/m²     Vitals and nursing note reviewed.   Constitutional:       General: Awake.      Appearance: Normal and healthy appearance. Well-developed, normal weight and not in distress.   Eyes:      General: Lids are normal.      Conjunctiva/sclera: Conjunctivae normal.      Pupils: Pupils are equal, round, and reactive to light.   HENT:      Head: Normocephalic and atraumatic.      Nose: Nose normal.   Neck:      Vascular: No JVR. JVD normal.   Pulmonary:      Effort: Pulmonary effort is normal.      Breath sounds: Normal breath sounds. No wheezing. No rhonchi. No rales.   Chest:      Chest wall: Not tender to palpatation.   Cardiovascular:      PMI at left midclavicular line. Normal rate. Regular rhythm. Normal S1. Normal S2.      Murmurs: There is no murmur.      No gallop. No click. No rub.   Pulses:     Intact distal pulses.   Edema:     Peripheral edema absent.   Abdominal:      General: Bowel sounds are normal.      Palpations: Abdomen is soft.      Tenderness: There is no abdominal tenderness.   Musculoskeletal: Normal range of motion.         General: No tenderness.      Cervical back: Normal range of motion. Skin:     General: Skin is warm and dry.   Neurological:      General: No focal deficit present.      Mental Status: Alert, oriented to person, place, and time and oriented to person, place and time.   Psychiatric:         Attention and Perception: Attention and perception normal.         Mood and Affect: Mood and affect normal.         Speech: Speech normal.         Behavior: Behavior normal. Behavior is cooperative.         Thought Content: Thought content normal.         Cognition and Memory: Cognition and memory normal.         Judgment: Judgment normal.        Result Review :   The following data was reviewed by: ROWDY Simon on 11/03/2021:      Data reviewed: Cardiology studies 2d " echo 11/24/20     ECG 12 Lead    Date/Time: 11/3/2021 9:56 AM  Performed by: Rose Whitten APRN  Authorized by: Rose Whitten APRN   Comparison: compared with previous ECG from 4/30/2021  Similar to previous ECG  Rhythm: paced  Rate: normal  BPM: 61    Clinical impression: abnormal EKG              Assessment and Plan    Diagnoses and all orders for this visit:    1. Coronary artery disease involving native coronary artery of native heart without angina pectoris (Primary)-no clinical signs of ischemia.    2. History of coronary rotational ablation-2009 per Dr. García Uriarte.  Continue aspirin and Plavix.  Patient denies bleeding.    3. Sick sinus syndrome (HCC)-status post pacemaker.    4. Presence of cardiac pacemaker-interrogated 4/23/2021.  1 episode of nonsustained ventricular tachycardia, which was 4 seconds in duration.  99% a paced, 1% V paced, 0% A. fib burden.  Normal function, stable thresholds and adequate battery. Interrogated in office today. 97.8% a-paced, 0.8% v-aced, NSVT X2 with longest duration of 4 seconds. Full report pending.    5. Primary hypertension-blood pressures well controlled.  Continue propranolol.  Monitor and record daily blood pressure. Report readings consistently higher than 130/90 or consistently lower than 100/60.     6. Hyperlipidemia LDL goal <70-management per PCP.  Continue atorvastatin.    Advance Care Planning   ACP discussion was held with the patient during this visit. Patient does not have an advance directive, information provided.        Follow Up   Return in about 6 months (around 5/3/2022) for Next scheduled follow up with Dr. Liz.  Patient was given instructions and counseling regarding his condition or for health maintenance advice. Please see specific information pulled into the AVS if appropriate.

## 2021-11-03 ENCOUNTER — CLINICAL SUPPORT NO REQUIREMENTS (OUTPATIENT)
Dept: CARDIOLOGY | Facility: CLINIC | Age: 71
End: 2021-11-03

## 2021-11-03 ENCOUNTER — OFFICE VISIT (OUTPATIENT)
Dept: CARDIOLOGY | Facility: CLINIC | Age: 71
End: 2021-11-03

## 2021-11-03 VITALS
HEIGHT: 66 IN | DIASTOLIC BLOOD PRESSURE: 78 MMHG | SYSTOLIC BLOOD PRESSURE: 132 MMHG | BODY MASS INDEX: 29.89 KG/M2 | OXYGEN SATURATION: 98 % | HEART RATE: 61 BPM | WEIGHT: 186 LBS

## 2021-11-03 DIAGNOSIS — E78.5 HYPERLIPIDEMIA LDL GOAL <70: Chronic | ICD-10-CM

## 2021-11-03 DIAGNOSIS — Z95.0 PRESENCE OF CARDIAC PACEMAKER: Chronic | ICD-10-CM

## 2021-11-03 DIAGNOSIS — I49.5 SICK SINUS SYNDROME (HCC): Chronic | ICD-10-CM

## 2021-11-03 DIAGNOSIS — I25.10 CORONARY ARTERY DISEASE INVOLVING NATIVE CORONARY ARTERY OF NATIVE HEART WITHOUT ANGINA PECTORIS: Primary | Chronic | ICD-10-CM

## 2021-11-03 DIAGNOSIS — I10 PRIMARY HYPERTENSION: Chronic | ICD-10-CM

## 2021-11-03 DIAGNOSIS — Z98.890: Chronic | ICD-10-CM

## 2021-11-03 PROCEDURE — 93000 ELECTROCARDIOGRAM COMPLETE: CPT | Performed by: NURSE PRACTITIONER

## 2021-11-03 PROCEDURE — 99214 OFFICE O/P EST MOD 30 MIN: CPT | Performed by: NURSE PRACTITIONER

## 2021-11-03 PROCEDURE — 93288 INTERROG EVL PM/LDLS PM IP: CPT | Performed by: PHYSICIAN ASSISTANT

## 2021-11-03 NOTE — PATIENT INSTRUCTIONS
Advance Care Planning and Advance Directives     You make decisions on a daily basis - decisions about where you want to live, your career, your home, your life. Perhaps one of the most important decisions you face is your choice for future medical care. Take time to talk with your family and your healthcare team and start planning today.  Advance Care Planning is a process that can help you:  · Understand possible future healthcare decisions in light of your own experiences  · Reflect on those decision in light of your goals and values  · Discuss your decisions with those closest to you and the healthcare professionals that care for you  · Make a plan by creating a document that reflects your wishes    Surrogate Decision Maker  In the event of a medical emergency, which has left you unable to communicate or to make your own decisions, you would need someone to make decisions for you.  It is important to discuss your preferences for medical treatment with this person while you are in good health.     Qualities of a surrogate decision maker:  • Willing to take on this role and responsibility  • Knows what you want for future medical care  • Willing to follow your wishes even if they don't agree with them  • Able to make difficult medical decisions under stressful circumstances    Advance Directives  These are legal documents you can create that will guide your healthcare team and decision maker(s) when needed. These documents can be stored in the electronic medical record.    · Living Will - a legal document to guide your care if you have a terminal condition or a serious illness and are unable to communicate. States vary by statute in document names/types, but most forms may include one or more of the following:        -  Directions regarding life-prolonging treatments        -  Directions regarding artificially provided nutrition/hydration        -  Choosing a healthcare decision maker        -  Direction  regarding organ/tissue donation    · Durable Power of  for Healthcare - this document names an -in-fact to make medical decisions for you, but it may also allow this person to make personal and financial decisions for you. Please seek the advice of an  if you need this type of document.    **Advance Directives are not required and no one may discriminate against you if you do not sign one.    Medical Orders  Many states allow specific forms/orders signed by your physician to record your wishes for medical treatment in your current state of health. This form, signed in personal communication with your physician, addresses resuscitation and other medical interventions that you may or may not want.      For more information or to schedule a time with a [facility] Advance Care Planning Facilitator contact: [email or web address] or call [phone] and someone will contact you directly.

## 2021-11-13 NOTE — PROGRESS NOTES
Dual Chamber Pacemaker Evaluation Report  Clinic Interrogation    November 13, 2021    Primary Cardiologist: Shalonda  : Medtronic Model: Adapta ADDR01  Implant date: 10/26/09    Reason for evaluation: routine  Indication for pacemaker: sick sinus syndrome    Measurements  Atrial sensing - P wave: n/r, >80% paced  Atrial threshold: 0.5 V@ 0.4 ms  Atrial lead impedance: 538 ohms  Ventricular sensing - R wave: 11.2-22.4 mV  Ventricular threshold: 1.0* V @ 0.4 ms  Ventricular lead impedance:   725 ohms     Diagnostic Data  Atrial paced: 97.8 %  Ventricular paced: 0.9 %  Other: 3 second run of NSVT  Battery status: intensify follow up         Final Parameters  Mode:  AAIR+  Lower rate: 60 bpm   Upper rate: 130 bpm  AV Delay: paced- 160 ms  Sensed-140 ms  Atrial - Amplitude: 1.5 V   Pulse width: 0.4 ms   Sensitivity: 0.5 mV     Ventricular - Amplitude: 2.0 V  Pulse width: 0.4 ms  Sensitivity: 4.0 mV    Changes made: none  Conclusions: normal pacemaker function    Follow up: 2 month battery check via [employer]

## 2021-11-19 NOTE — PROGRESS NOTES
Subjective    Mr. Sofia is 71 y.o. male    Chief Complaint: 3 Month follow up for BPH. PSA was drawn at PCP.    History of Present Illness  Patient is a 71-year-old gentleman who had a PSA drawn that was 4.8 done 08/28/2021.  There were no previous PSAs for comparison digital rectal exam was benign.  Repeat PSA was done in October 2021 which was 4.7 so it is stable.  He does not have any bothersome voiding complaints.  He is not on any urologic medication.      The following portions of the patient's history were reviewed and updated as appropriate: allergies, current medications, past family history, past medical history, past social history, past surgical history and problem list.    Review of Systems   Constitutional: Negative for chills and fever.   Gastrointestinal: Negative for abdominal pain, anal bleeding and blood in stool.   Genitourinary: Negative for decreased urine volume, difficulty urinating, dysuria, enuresis, flank pain, frequency, genital sores, hematuria, penile discharge, penile pain, penile swelling, scrotal swelling, testicular pain and urgency.         Current Outpatient Medications:   •  allopurinol (ZYLOPRIM) 300 MG tablet, Take 300 mg by mouth 2 (Two) Times a Day., Disp: , Rfl:   •  aspirin (aspirin) 81 MG EC tablet, Aspir-81 mg tablet,delayed release  Take 1 tablet every day by oral route., Disp: , Rfl:   •  atorvastatin (LIPITOR) 20 MG tablet, atorvastatin 20 mg tablet, Disp: , Rfl:   •  clopidogrel (PLAVIX) 75 MG tablet, , Disp: , Rfl:   •  Cranberry 500 MG capsule, cranberry 500 mg capsule  Take 1 capsule every day by oral route as directed., Disp: , Rfl:   •  naproxen (NAPROSYN) 500 MG tablet, Take 500 mg by mouth 2 (Two) Times a Day With Meals., Disp: , Rfl:   •  omega-3 acid ethyl esters (LOVAZA) 1 g capsule, Take 2 g by mouth 2 (Two) Times a Day., Disp: , Rfl:   •  propranolol (INDERAL) 20 MG tablet, Take 20 mg by mouth 2 (Two) Times a Day., Disp: , Rfl:   •  sildenafil (VIAGRA) 100  "MG tablet, Take 1 tablet by mouth As Needed for Erectile Dysfunction (1-4 Hours before activity) for up to 10 doses., Disp: 10 tablet, Rfl: 11    Past Medical History:   Diagnosis Date   • Bradycardia    • Coronary artery disease involving native coronary artery without angina pectoris 10/23/2020   • History of coronary rotational ablation 10/23/2020    2009 per Dr. John Broadbent   • Hyperlipidemia LDL goal <70 11/2/2021   • Presence of cardiac pacemaker 10/23/2020   • Primary hypertension 11/2/2021   • Sick sinus syndrome (HCC) 10/23/2020       Past Surgical History:   Procedure Laterality Date   • PACEMAKER IMPLANTATION     • ROTATOR CUFF REPAIR Right 10/2017   • TONSILLECTOMY         Social History     Socioeconomic History   • Marital status:    Tobacco Use   • Smoking status: Never Smoker   • Smokeless tobacco: Never Used   Vaping Use   • Vaping Use: Never used   Substance and Sexual Activity   • Alcohol use: Yes     Comment: occ   • Drug use: Never   • Sexual activity: Defer       History reviewed. No pertinent family history.    Objective    Temp 98.7 °F (37.1 °C)   Ht 167.6 cm (65.98\")   Wt 83.5 kg (184 lb)   BMI 29.71 kg/m²     Physical Exam  Vitals reviewed.   HENT:      Head: Normocephalic and atraumatic.      Right Ear: External ear normal.      Left Ear: External ear normal.   Pulmonary:      Effort: Pulmonary effort is normal.   Neurological:      General: No focal deficit present.      Mental Status: He is alert and oriented to person, place, and time.   Psychiatric:         Mood and Affect: Mood normal.         Behavior: Behavior normal.             Results for orders placed or performed in visit on 11/24/21   POC Urinalysis Dipstick    Specimen: Urine   Result Value Ref Range    Color Yellow Yellow, Straw, Dark Yellow, [place]    Clarity, UA Clear Clear    Glucose, UA Negative Negative, 1000 mg/dL (3+) mg/dL    Bilirubin Negative Negative    Ketones, UA Negative Negative    Specific Gravity "  1.020 1.005 - 1.030    Blood, UA Negative Negative    pH, Urine 6.5 5.0 - 8.0    Protein, POC Negative Negative mg/dL    Urobilinogen, UA Normal Normal    Leukocytes Negative Negative    Nitrite, UA Negative Negative   Bladder Scan interpretation  Estimation of residual urine via abdominal ultrasound  Residual Urine: 72ml  Indication:Frequency   Position: Supine  Examination: Incremental scanning of the suprapubic area using 3 MHz transducer using copious amounts of acoustic gel.   Findings: An anechoic area was demonstrated which represented the bladder, with measurement of residual urine as noted. I inspected this myself. In that the residual urine was stable or insignificant, no treatment will be necessary at this time.     No results found for: PSA  International Prostate Symptom Score  The following is posted based on patient questionnaire answers:  0 - not at all    1-7 mild symptoms  1- Less than one time in five  8-19 moderate symptoms  2 -Less than half the time  20-35 severe symptoms  3 - About half the time  4 - More than half the time  5 - Almost always     For following sections:  Incomplete Emptying: - How often have you had the sensation  of not emptying your bladder completely after you finished urinating?  0  Frequency: -How often have you had to urinate again less than   two hours after you finished urinating?      0  Intermittency: -How often have you found you stopped and started again  Several times when you urinate?       0Urgency: -How often do you find it difficult to postpone urination?             0Weak stream: - How often have you had a weak urinary stream?             0Straining: - How often have you had to push or strain to begin  Urination?          0  Sleeping: -How many times did you most typically get up to urinate   From the time you went to bed at night until the time you got up in the   0Morning          Total `  5    Quality of Life  How would you feel if you had to live with  your urinary condition the way   4  It is now, no better, no worse for the rest of your life?    Where: 0=delighted; 1= pleased, 2= mostly satisfied, 3= mixed, 4 = mostly  Dissatisfied, 5= Unhappy, 6 = terrible    Assessment and Plan    Diagnoses and all orders for this visit:    1. Elevated prostate specific antigen (PSA) (Primary)  -     POC Urinalysis Dipstick  -     Cancel: PSA DIAGNOSTIC; Future  -     PSA DIAGNOSTIC; Future    2. Erectile dysfunction, unspecified erectile dysfunction type    Repeat PSA was 4.7 which is decreased by 0.1 and stable from previous.  PSA in August was 4.8 there is no prior baseline PSAs for comparison.  His digital rectal exam is initial visit was benign follow-up 6 months PSA prior if stable then can be checked annually.    Erectile dysfunction was briefly discussed he has had mixed response with oral medications.  We did discuss penile injections as an option if he should wish to pursue that he will contact us.

## 2021-11-24 ENCOUNTER — OFFICE VISIT (OUTPATIENT)
Dept: UROLOGY | Facility: CLINIC | Age: 71
End: 2021-11-24

## 2021-11-24 VITALS — BODY MASS INDEX: 29.57 KG/M2 | TEMPERATURE: 98.7 F | WEIGHT: 184 LBS | HEIGHT: 66 IN

## 2021-11-24 DIAGNOSIS — R97.20 ELEVATED PROSTATE SPECIFIC ANTIGEN (PSA): Primary | ICD-10-CM

## 2021-11-24 DIAGNOSIS — N52.9 ERECTILE DYSFUNCTION, UNSPECIFIED ERECTILE DYSFUNCTION TYPE: ICD-10-CM

## 2021-11-24 LAB
BILIRUB BLD-MCNC: NEGATIVE MG/DL
CLARITY, POC: CLEAR
COLOR UR: YELLOW
GLUCOSE UR STRIP-MCNC: NEGATIVE MG/DL
KETONES UR QL: NEGATIVE
LEUKOCYTE EST, POC: NEGATIVE
NITRITE UR-MCNC: NEGATIVE MG/ML
PH UR: 6.5 [PH] (ref 5–8)
PROT UR STRIP-MCNC: NEGATIVE MG/DL
RBC # UR STRIP: NEGATIVE /UL
SP GR UR: 1.02 (ref 1–1.03)
UROBILINOGEN UR QL: NORMAL

## 2021-11-24 PROCEDURE — 99213 OFFICE O/P EST LOW 20 MIN: CPT | Performed by: PHYSICIAN ASSISTANT

## 2021-11-24 PROCEDURE — 51798 US URINE CAPACITY MEASURE: CPT | Performed by: PHYSICIAN ASSISTANT

## 2021-11-24 PROCEDURE — 81002 URINALYSIS NONAUTO W/O SCOPE: CPT | Performed by: PHYSICIAN ASSISTANT

## 2021-11-30 NOTE — PROGRESS NOTES
Agree with assessment and plan of mid level provider as below with any changes if made as noted by Ann Peng PA-C  of Emerson Sofia.

## 2022-03-08 DIAGNOSIS — R06.02 BREATH SHORTNESS: ICD-10-CM

## 2022-03-08 DIAGNOSIS — Z45.010 PACEMAKER BATTERY DEPLETION: Primary | ICD-10-CM

## 2022-03-08 RX ORDER — LIDOCAINE HYDROCHLORIDE 10 MG/ML
0.1 INJECTION, SOLUTION EPIDURAL; INFILTRATION; INTRACAUDAL; PERINEURAL ONCE AS NEEDED
Status: CANCELLED | OUTPATIENT
Start: 2022-03-08

## 2022-03-08 RX ORDER — ONDANSETRON 2 MG/ML
4 INJECTION INTRAMUSCULAR; INTRAVENOUS EVERY 6 HOURS PRN
Status: CANCELLED | OUTPATIENT
Start: 2022-03-08

## 2022-03-08 RX ORDER — ACETAMINOPHEN 325 MG/1
650 TABLET ORAL EVERY 4 HOURS PRN
Status: CANCELLED | OUTPATIENT
Start: 2022-03-08

## 2022-03-10 PROBLEM — Z45.010 PACEMAKER BATTERY DEPLETION: Status: ACTIVE | Noted: 2022-03-10

## 2022-04-08 ENCOUNTER — HOSPITAL ENCOUNTER (OUTPATIENT)
Facility: HOSPITAL | Age: 72
Setting detail: HOSPITAL OUTPATIENT SURGERY
Discharge: HOME | End: 2022-04-08
Attending: INTERNAL MEDICINE | Admitting: INTERNAL MEDICINE

## 2022-04-08 VITALS
TEMPERATURE: 97 F | HEART RATE: 60 BPM | DIASTOLIC BLOOD PRESSURE: 96 MMHG | RESPIRATION RATE: 16 BRPM | SYSTOLIC BLOOD PRESSURE: 123 MMHG | OXYGEN SATURATION: 97 %

## 2022-04-08 DIAGNOSIS — Z45.010 PACEMAKER BATTERY DEPLETION: ICD-10-CM

## 2022-04-08 DIAGNOSIS — R06.02 BREATH SHORTNESS: ICD-10-CM

## 2022-04-08 LAB
ALBUMIN SERPL-MCNC: 3.7 G/DL (ref 3.5–5.2)
ALBUMIN/GLOB SERPL: 1 G/DL
ALP SERPL-CCNC: 92 U/L (ref 39–117)
ALT SERPL W P-5'-P-CCNC: 27 U/L (ref 1–41)
ANION GAP SERPL CALCULATED.3IONS-SCNC: 11 MMOL/L (ref 5–15)
AST SERPL-CCNC: 23 U/L (ref 1–40)
BASOPHILS # BLD AUTO: 0.04 10*3/MM3 (ref 0–0.2)
BASOPHILS NFR BLD AUTO: 0.6 % (ref 0–1.5)
BILIRUB SERPL-MCNC: 1 MG/DL (ref 0–1.2)
BUN SERPL-MCNC: 21 MG/DL (ref 8–23)
BUN/CREAT SERPL: 19.6 (ref 7–25)
CALCIUM SPEC-SCNC: 9.6 MG/DL (ref 8.6–10.5)
CHLORIDE SERPL-SCNC: 103 MMOL/L (ref 98–107)
CO2 SERPL-SCNC: 25 MMOL/L (ref 22–29)
CREAT SERPL-MCNC: 1.07 MG/DL (ref 0.76–1.27)
DEPRECATED RDW RBC AUTO: 59.4 FL (ref 37–54)
EGFRCR SERPLBLD CKD-EPI 2021: 74.2 ML/MIN/1.73
EOSINOPHIL # BLD AUTO: 0.08 10*3/MM3 (ref 0–0.4)
EOSINOPHIL NFR BLD AUTO: 1.2 % (ref 0.3–6.2)
ERYTHROCYTE [DISTWIDTH] IN BLOOD BY AUTOMATED COUNT: 17.4 % (ref 12.3–15.4)
GLOBULIN UR ELPH-MCNC: 3.6 GM/DL
GLUCOSE SERPL-MCNC: 92 MG/DL (ref 65–99)
HCT VFR BLD AUTO: 47.1 % (ref 37.5–51)
HGB BLD-MCNC: 15.5 G/DL (ref 13–17.7)
IMM GRANULOCYTES # BLD AUTO: 0.01 10*3/MM3 (ref 0–0.05)
IMM GRANULOCYTES NFR BLD AUTO: 0.1 % (ref 0–0.5)
LYMPHOCYTES # BLD AUTO: 1.41 10*3/MM3 (ref 0.7–3.1)
LYMPHOCYTES NFR BLD AUTO: 20.9 % (ref 19.6–45.3)
MCH RBC QN AUTO: 30.6 PG (ref 26.6–33)
MCHC RBC AUTO-ENTMCNC: 32.9 G/DL (ref 31.5–35.7)
MCV RBC AUTO: 93.1 FL (ref 79–97)
MONOCYTES # BLD AUTO: 0.88 10*3/MM3 (ref 0.1–0.9)
MONOCYTES NFR BLD AUTO: 13 % (ref 5–12)
NEUTROPHILS NFR BLD AUTO: 4.34 10*3/MM3 (ref 1.7–7)
NEUTROPHILS NFR BLD AUTO: 64.2 % (ref 42.7–76)
NRBC BLD AUTO-RTO: 0 /100 WBC (ref 0–0.2)
PLATELET # BLD AUTO: 164 10*3/MM3 (ref 140–450)
PMV BLD AUTO: 11.5 FL (ref 6–12)
POTASSIUM SERPL-SCNC: 4.5 MMOL/L (ref 3.5–5.2)
PROT SERPL-MCNC: 7.3 G/DL (ref 6–8.5)
RBC # BLD AUTO: 5.06 10*6/MM3 (ref 4.14–5.8)
SODIUM SERPL-SCNC: 139 MMOL/L (ref 136–145)
WBC NRBC COR # BLD: 6.76 10*3/MM3 (ref 3.4–10.8)

## 2022-04-08 PROCEDURE — 80053 COMPREHEN METABOLIC PANEL: CPT | Performed by: INTERNAL MEDICINE

## 2022-04-08 PROCEDURE — 25010000002 MIDAZOLAM PER 1 MG: Performed by: INTERNAL MEDICINE

## 2022-04-08 PROCEDURE — 33228 REMV&REPLC PM GEN DUAL LEAD: CPT | Performed by: INTERNAL MEDICINE

## 2022-04-08 PROCEDURE — C1785 PMKR, DUAL, RATE-RESP: HCPCS | Performed by: INTERNAL MEDICINE

## 2022-04-08 PROCEDURE — C1889 IMPLANT/INSERT DEVICE, NOC: HCPCS | Performed by: INTERNAL MEDICINE

## 2022-04-08 PROCEDURE — 25010000002 FENTANYL CITRATE (PF) 50 MCG/ML SOLUTION: Performed by: INTERNAL MEDICINE

## 2022-04-08 PROCEDURE — 85025 COMPLETE CBC W/AUTO DIFF WBC: CPT | Performed by: INTERNAL MEDICINE

## 2022-04-08 PROCEDURE — S0260 H&P FOR SURGERY: HCPCS | Performed by: INTERNAL MEDICINE

## 2022-04-08 DEVICE — ENV PM AIGISRX ANTIBAC RESORB 2.9X3.3IN LG: Type: IMPLANTABLE DEVICE | Status: FUNCTIONAL

## 2022-04-08 DEVICE — GEN PM AZURE XT SURESCAN DR MRI: Type: IMPLANTABLE DEVICE | Status: FUNCTIONAL

## 2022-04-08 DEVICE — SEAL HEMO SURG ARISTA/AH ABS/PWDR 1GM: Type: IMPLANTABLE DEVICE | Status: FUNCTIONAL

## 2022-04-08 RX ORDER — ASPIRIN 81 MG/1
TABLET, CHEWABLE ORAL
Status: COMPLETED
Start: 2022-04-08 | End: 2022-04-08

## 2022-04-08 RX ORDER — LIDOCAINE HYDROCHLORIDE 10 MG/ML
0.1 INJECTION, SOLUTION EPIDURAL; INFILTRATION; INTRACAUDAL; PERINEURAL ONCE AS NEEDED
Status: DISCONTINUED | OUTPATIENT
Start: 2022-04-08 | End: 2022-04-08 | Stop reason: HOSPADM

## 2022-04-08 RX ORDER — CELECOXIB 200 MG/1
200 CAPSULE ORAL 2 TIMES DAILY
COMMUNITY

## 2022-04-08 RX ORDER — MIDAZOLAM HYDROCHLORIDE 1 MG/ML
INJECTION INTRAMUSCULAR; INTRAVENOUS AS NEEDED
Status: DISCONTINUED | OUTPATIENT
Start: 2022-04-08 | End: 2022-04-08 | Stop reason: HOSPADM

## 2022-04-08 RX ORDER — ASPIRIN 81 MG/1
324 TABLET, CHEWABLE ORAL ONCE
Status: COMPLETED | OUTPATIENT
Start: 2022-04-08 | End: 2022-04-08

## 2022-04-08 RX ORDER — NEOMYCIN AND POLYMYXIN B SULFATES 40; 200000 MG/ML; [USP'U]/ML
SOLUTION IRRIGATION AS NEEDED
Status: DISCONTINUED | OUTPATIENT
Start: 2022-04-08 | End: 2022-04-08 | Stop reason: HOSPADM

## 2022-04-08 RX ORDER — CLINDAMYCIN PHOSPHATE 900 MG/50ML
900 INJECTION INTRAVENOUS
Status: COMPLETED | OUTPATIENT
Start: 2022-04-08 | End: 2022-04-08

## 2022-04-08 RX ORDER — ACETAMINOPHEN 325 MG/1
650 TABLET ORAL EVERY 4 HOURS PRN
Status: DISCONTINUED | OUTPATIENT
Start: 2022-04-08 | End: 2022-04-08 | Stop reason: HOSPADM

## 2022-04-08 RX ORDER — FENTANYL CITRATE 50 UG/ML
INJECTION, SOLUTION INTRAMUSCULAR; INTRAVENOUS AS NEEDED
Status: DISCONTINUED | OUTPATIENT
Start: 2022-04-08 | End: 2022-04-08 | Stop reason: HOSPADM

## 2022-04-08 RX ORDER — LIDOCAINE HYDROCHLORIDE 20 MG/ML
INJECTION, SOLUTION INFILTRATION; PERINEURAL AS NEEDED
Status: DISCONTINUED | OUTPATIENT
Start: 2022-04-08 | End: 2022-04-08 | Stop reason: HOSPADM

## 2022-04-08 RX ORDER — ONDANSETRON 2 MG/ML
4 INJECTION INTRAMUSCULAR; INTRAVENOUS EVERY 6 HOURS PRN
Status: DISCONTINUED | OUTPATIENT
Start: 2022-04-08 | End: 2022-04-08 | Stop reason: HOSPADM

## 2022-04-08 RX ADMIN — ASPIRIN 324 MG: 81 TABLET, CHEWABLE ORAL at 15:57

## 2022-04-08 RX ADMIN — CLINDAMYCIN IN 5 PERCENT DEXTROSE 900 MG: 18 INJECTION, SOLUTION INTRAVENOUS at 14:22

## 2022-04-08 NOTE — H&P
LOS: 0 days   Patient Care Team:  Merly Akbar APRN as PCP - General  Merly Akbar APRN as PCP - Family Medicine  Merly Akbar APRN as Referring Physician (Family Medicine)  Jethro Liz MD as Cardiologist (Cardiology)    Chief Complaint: Preoperative evaluation for pacemaker generator replacement     Adilene Sofia is a 72 y.o. male who is being seen in cardiovascular observation unit  Patient is presented for elective pacemaker generator replacement    Mild chronic exertional shortness of breath on exertion relieved with rest  No significant cough or wheezing    No palpitations  No associated chest pain  No significant pedal edema    No fever or chills  No significant expectoration    No hemoptysis  No presyncope or syncope    Tolerating current medications well with no untoward side effects   Compliant with prescribed medication regimen. Tries to adhere to cardiac diet.     He is holding his Plavix for 5 days prior to procedure  Has been on aspirin    No bleeding, excessive bruising, gait instability or fall risks  Patient is not pacemaker dependent    Review of Systems   Constitutional: No chills   Has fatigue   No fever.   HENT: Negative.    Eyes: Negative.    Respiratory: Negative for cough,   No chest wall soreness,   Shortness of breath,   no wheezing, no stridor.    Cardiovascular: As above  Gastrointestinal: Negative for abdominal distention,  No abdominal pain,   No blood in stool,   No constipation,   No diarrhea,   No nausea   No vomiting.   Endocrine: Negative.    Genitourinary: Negative for difficulty urinating, dysuria, flank pain and hematuria.   Musculoskeletal: Negative.    Skin: Negative for rash and wound.   Allergic/Immunologic: Negative.    Neurological: Negative for dizziness, syncope, weakness,   No light-headedness  No  headaches.   Hematological: Does not bruise/bleed easily.   Psychiatric/Behavioral: Negative for agitation or behavioral problems,   No  confusion,   the patient is  nervous/anxious.       History:   Past Medical History:   Diagnosis Date   • Bradycardia    • Coronary artery disease involving native coronary artery without angina pectoris 10/23/2020   • History of coronary rotational ablation 10/23/2020    2009 per Dr. John Broadbent   • Hyperlipidemia LDL goal <70 11/2/2021   • Presence of cardiac pacemaker 10/23/2020   • Primary hypertension 11/2/2021   • Sick sinus syndrome (HCC) 10/23/2020     Past Surgical History:   Procedure Laterality Date   • PACEMAKER IMPLANTATION     • ROTATOR CUFF REPAIR Right 10/2017   • TONSILLECTOMY       Social History     Socioeconomic History   • Marital status:    Tobacco Use   • Smoking status: Never Smoker   • Smokeless tobacco: Never Used   Vaping Use   • Vaping Use: Never used   Substance and Sexual Activity   • Alcohol use: Yes     Comment: occ   • Drug use: Never   • Sexual activity: Defer     No family history on file.    Labs:  WBC WBC   Date Value Ref Range Status   04/08/2022 6.76 3.40 - 10.80 10*3/mm3 Final      HGB Hemoglobin   Date Value Ref Range Status   04/08/2022 15.5 13.0 - 17.7 g/dL Final      HCT Hematocrit   Date Value Ref Range Status   04/08/2022 47.1 37.5 - 51.0 % Final      Platelets Platelets   Date Value Ref Range Status   04/08/2022 164 140 - 450 10*3/mm3 Final      MCV MCV   Date Value Ref Range Status   04/08/2022 93.1 79.0 - 97.0 fL Final        Results from last 7 days   Lab Units 04/08/22  1255   SODIUM mmol/L 139   POTASSIUM mmol/L 4.5   CHLORIDE mmol/L 103   CO2 mmol/L 25.0   BUN mg/dL 21   CREATININE mg/dL 1.07   CALCIUM mg/dL 9.6   BILIRUBIN mg/dL 1.0   ALK PHOS U/L 92   ALT (SGPT) U/L 27   AST (SGOT) U/L 23   GLUCOSE mg/dL 92   No results found for: CKTOTAL, CKMB, CKMBINDEX, TROPONINI, TROPONINT  PT/INR:  No results found for: PROTIME/No results found for: INR    Imaging Results (Last 72 Hours)     ** No results found for the last 72 hours. **          Objective      Allergies   Allergen Reactions   • Penicillins Other (See Comments)       Medication Review: Performed  Current Facility-Administered Medications   Medication Dose Route Frequency Provider Last Rate Last Admin   • acetaminophen (TYLENOL) tablet 650 mg  650 mg Oral Q4H PRN Jethro Liz MD       • clindamycin (CLEOCIN) 900 mg in dextrose 5% 50 mL IVPB (premix)  900 mg Intravenous 60 Min Pre-Op Jethro Liz MD       • lidocaine PF 1% (XYLOCAINE) injection 0.1 mL  0.1 mL Intradermal Once PRN Jethro Liz MD       • ondansetron (ZOFRAN) injection 4 mg  4 mg Intravenous Q6H PRN Jethro Liz MD           Vital Sign Min/Max for last 24 hours  Temp  Min: 97 °F (36.1 °C)  Max: 97 °F (36.1 °C)   BP  Min: 140/87  Max: 140/87   Pulse  Min: 70  Max: 70   Resp  Min: 16  Max: 16   SpO2  Min: 98 %  Max: 98 %   No data recorded   No data recorded         Results for orders placed during the hospital encounter of 11/24/20    Adult Transthoracic Echo Complete W/ Cont if Necessary Per Protocol    Interpretation Summary  · Left ventricular ejection fraction appears to be 56 - 60%. Left ventricular systolic function is normal.  · Left ventricular systolic function is normal. Abnormal global longitudinal LV strain (GLS) = -9%  · Left ventricular diastolic function was normal.  · No evidence of pulmonary hypertension is present.      Physical Exam:    General Appearance: Awake, alert, in no acute distress  Eyes: Pupils equal and reactive    Ears: Appear intact with no abnormalities noted  Nose: Nares normal, no drainage  Neck: supple, trachea midline, no carotid bruit and no JVD  Back: no kyphosis present,    Lungs: respirations regular, respirations even and respirations unlabored  Heart: normal S1, S2, no significant murmurs   No gallops or rubs  no rub and no click  Abdomen: normal bowel sounds, no tenderness   Skin: no bleeding, bruising or rash  Extremities: no cyanosis  Psychiatric/Behavioral: Negative for agitation,  behavioral problems, confusion, the patient does  appear to be nervous/anxious.       Results Review:   I reviewed the patient's new clinical results.  I reviewed the patient's new imaging results and agree with the interpretation.  I reviewed the patient's other test results and agree with the interpretation  I personally viewed and interpreted the patient's EKG/Telemetry data    Discussed with patient  Updated patient regarding any new or relevant abnormalities on review of records or any new findings on physical exam.   Mentioned to patient about purpose of visit and desirable health short and long term goals and objectives.     Reviewed available prior notes, consults, prior visits, laboratory findings, radiology and cardiology relevant reports.   Updated chart as applicable.   I have reviewed the patient's medical history in detail and updated the computerized patient record as relevant.          Assessment/Plan       Pacemaker battery depletion  Coronary artery disease  Prior coronary rotablation  Sick sinus syndrome  Prior pacemaker implantation with current battery depletion    Plan    We will proceed with generator replacement  Risks, benefits and alternatives explained. The patient understands and wishes to proceed.   He understands specially risk of device infection  After discharge will be seen in follow-up in the clinic within 1 week of discharge for wound inspection      Jethro Liz MD  04/08/22  13:42 CDT    EMR Dragon/Transcription was used to dictate part of this note

## 2022-04-14 ENCOUNTER — OFFICE VISIT (OUTPATIENT)
Dept: CARDIOLOGY | Facility: CLINIC | Age: 72
End: 2022-04-14

## 2022-04-14 VITALS
DIASTOLIC BLOOD PRESSURE: 92 MMHG | SYSTOLIC BLOOD PRESSURE: 150 MMHG | WEIGHT: 185 LBS | BODY MASS INDEX: 29.73 KG/M2 | HEART RATE: 68 BPM | HEIGHT: 66 IN

## 2022-04-14 DIAGNOSIS — I25.10 CORONARY ARTERY DISEASE INVOLVING NATIVE CORONARY ARTERY OF NATIVE HEART WITHOUT ANGINA PECTORIS: Chronic | ICD-10-CM

## 2022-04-14 DIAGNOSIS — Z95.0 PRESENCE OF CARDIAC PACEMAKER: Primary | Chronic | ICD-10-CM

## 2022-04-14 DIAGNOSIS — I49.5 SICK SINUS SYNDROME: Chronic | ICD-10-CM

## 2022-04-14 DIAGNOSIS — Z98.890: Chronic | ICD-10-CM

## 2022-04-14 DIAGNOSIS — I10 PRIMARY HYPERTENSION: Chronic | ICD-10-CM

## 2022-04-14 DIAGNOSIS — E78.5 HYPERLIPIDEMIA LDL GOAL <70: Chronic | ICD-10-CM

## 2022-04-14 DIAGNOSIS — R06.09 DOE (DYSPNEA ON EXERTION): ICD-10-CM

## 2022-04-14 PROCEDURE — 93000 ELECTROCARDIOGRAM COMPLETE: CPT | Performed by: INTERNAL MEDICINE

## 2022-04-14 PROCEDURE — 99024 POSTOP FOLLOW-UP VISIT: CPT | Performed by: INTERNAL MEDICINE

## 2022-04-14 NOTE — PROGRESS NOTES
Emerson Sofia  [id number]  1950  72 y.o.  male    Referring Provider: Merly Akbar APRN    Reason for  Visit:      short term office follow up after recent encounter   Initial visit for coronary artery disease s/p percutaneous coronary intervention    sick sinus syndrome s/p pacemaker   Cardiac workup test results as below: echo,   Had recent generator change out       Subjective    Mild chronic exertional shortness of breath on exertion relieved with rest  No significant cough or wheezing    No palpitations  No associated chest pain  No significant pedal edema    No fever or chills  No significant expectoration    No hemoptysis  No presyncope or syncope    Tolerating current medications well with no untoward side effects   Compliant with prescribed medication regimen. Tries to adhere to cardiac diet.     Surgical wound healed very well. No evidence of excessive bruising, pain, redness, swelling, discharge or foul odor noted  Patient declined any recent history of fever, chills, pain or warmth locally    BP well controlled at home. 130s/80s mm Hg     No bleeding, excessive bruising, gait instability or fall risks      History of present illness:  Emerson Sofia is a 72 y.o. yo male with  coronary artery disease s/p percutaneous coronary intervention    Rota-ablation sick sinus syndrome s/p pacemaker  who presents today for   Chief Complaint   Patient presents with   • Sick Sinus Syndrom     1 wk f/u - s/p gen change     .    History  Past Medical History:   Diagnosis Date   • Bradycardia    • Coronary artery disease involving native coronary artery without angina pectoris 10/23/2020   • History of coronary rotational ablation 10/23/2020    2009 per Dr. John Broadbent   • Hyperlipidemia LDL goal <70 11/2/2021   • Presence of cardiac pacemaker 10/23/2020   • Primary hypertension 11/2/2021   • Sick sinus syndrome (HCC) 10/23/2020   ,   Past Surgical History:   Procedure Laterality Date   • CARDIAC  ELECTROPHYSIOLOGY PROCEDURE N/A 4/8/2022    Procedure: PPM generator change - dual;  Surgeon: Jethro Liz MD;  Location:  [facility]AS[facility] LOC[facility];  Service: Cardiology;  Laterality: N/A;   • PACEMAKER IMPLANTATION     • ROTATOR CUFF REPAIR Right 10/2017   • TONSILLECTOMY     ,   History reviewed. No pertinent family history.,   Social History     Tobacco Use   • Smoking status: Never Smoker   • Smokeless tobacco: Never Used   Vaping Use   • Vaping Use: Never used   Substance Use Topics   • Alcohol use: Yes     Comment: occ   • Drug use: Never   ,     Medications  Current Outpatient Medications   Medication Sig Dispense Refill   • allopurinol (ZYLOPRIM) 300 MG tablet Take 300 mg by mouth 2 (Two) Times a Day.     • aspirin 81 MG EC tablet Aspir-81 mg tablet,delayed release   Take 1 tablet every day by oral route.     • atorvastatin (LIPITOR) 20 MG tablet atorvastatin 20 mg tablet     • celecoxib (CeleBREX) 200 MG capsule Take 200 mg by mouth 2 (Two) Times a Day.     • clopidogrel (PLAVIX) 75 MG tablet      • Cranberry 500 MG capsule cranberry 500 mg capsule   Take 1 capsule every day by oral route as directed.     • omega-3 acid ethyl esters (LOVAZA) 1 g capsule Take 2 g by mouth 2 (Two) Times a Day.     • propranolol (INDERAL) 20 MG tablet Take 20 mg by mouth 2 (Two) Times a Day.     • sildenafil (VIAGRA) 100 MG tablet Take 1 tablet by mouth As Needed for Erectile Dysfunction (1-4 Hours before activity) for up to 10 doses. 10 tablet 11     No current facility-administered medications for this visit.       Allergies:  Penicillins    Review of Systems  Review of Systems   Constitutional: Negative.   HENT: Negative.    Eyes: Negative.    Cardiovascular: Positive for dyspnea on exertion. Negative for chest pain, claudication, cyanosis, irregular heartbeat, leg swelling, near-syncope, orthopnea, palpitations, paroxysmal nocturnal dyspnea and syncope.   Respiratory: Negative.    Endocrine: Negative.   "  Hematologic/Lymphatic: Negative.    Skin: Negative.    Musculoskeletal: Positive for arthritis, back pain and joint pain.   Gastrointestinal: Negative for anorexia.   Genitourinary: Negative for decreased libido.   Neurological: Negative.    Psychiatric/Behavioral: Negative.        Objective     Physical Exam:  /92   Pulse 68   Ht 167.6 cm (66\")   Wt 83.9 kg (185 lb)   BMI 29.86 kg/m²     Physical Exam  Constitutional:       Appearance: He is well-developed.   HENT:      Head: Normocephalic.   Neck:      Vascular: Normal carotid pulses. No carotid bruit or JVD.      Trachea: No tracheal tenderness or tracheal deviation.   Cardiovascular:      Rate and Rhythm: Regular rhythm.      Pulses: Normal pulses.      Heart sounds: Murmur heard.    Systolic murmur is present with a grade of 2/6.  Pulmonary:      Effort: Pulmonary effort is normal.      Breath sounds: No stridor.   Abdominal:      General: There is no distension.      Palpations: Abdomen is soft.      Tenderness: There is no abdominal tenderness.   Musculoskeletal:      Cervical back: No edema.   Skin:     General: Skin is warm.   Neurological:      Mental Status: He is alert.      Cranial Nerves: No cranial nerve deficit.      Sensory: No sensory deficit.   Psychiatric:         Speech: Speech normal.         Behavior: Behavior normal.       Surgical wound healed very well. No evidence of excessive bruising, pain, redness, swelling, discharge or foul odor noted  Patient declined any recent history of fever, chills, pain or warmth locally       Results Review:    Results for orders placed during the hospital encounter of 11/24/20    Adult Transthoracic Echo Complete W/ Cont if Necessary Per Protocol    Interpretation Summary  · Left ventricular ejection fraction appears to be 56 - 60%. Left ventricular systolic function is normal.  · Left ventricular systolic function is normal. Abnormal global longitudinal LV strain (GLS) = -9%  · Left ventricular " diastolic function was normal.  · No evidence of pulmonary hypertension is present.          ____________________________________________________________________________________________________________________________________________  Health maintenance and recommendations    Low salt/ HTN/ Heart healthy carbohydrate restricted cardiac diet   The patient is advised to reduce or avoid caffeine or other cardiac stimulants.   Minimize or avoid  NSAID-type medications      Monitor for any signs of bleeding including red or dark stools. Fall precautions.   Advised staying uptodate with immunizations per established standard guidelines.    Offered to give patient  a copy of my notes     Questions were encouraged, asked and answered to the patient's  understanding and satisfaction. Questions if any regarding current medications and side effects, need for refills and importance of compliance to medications stressed.    Reviewed available prior notes, consults, prior visits, laboratory findings, radiology and cardiology relevant reports. Updated chart as applicable. I have reviewed the patient's medical history in detail and updated the computerized patient record as relevant.      Updated patient regarding any new or relevant abnormalities on review of records or any new findings on physical exam. Mentioned to patient about purpose of visit and desirable health short and long term goals and objectives.    Primary to monitor CBC CMP Lipid panel and TSH as applicable    ___________________________________________________________________________________________________________________________________________         ECG 12 Lead    Date/Time: 4/14/2022 11:04 AM  Performed by: Jethro Liz MD  Authorized by: Jethro Liz MD   Comparison: compared with previous ECG from 11/3/2021  Comparison to previous ECG: premature ventricular contractions   Atrial pacing   Rhythm: paced  Ectopy: unifocal PVCs  Rate: normal  Conduction:  incomplete right bundle branch block  QRS axis: normal    Clinical impression: abnormal EKG            Assessment/Plan   Diagnoses and all orders for this visit:    1. Presence of cardiac pacemaker (Primary)    2. Primary hypertension    3. Sick sinus syndrome (HCC)    4. History of coronary rotational ablation    5. Hyperlipidemia LDL goal <70    6. Coronary artery disease involving native coronary artery of native heart without angina pectoris  -     Stress Test With Myocardial Perfusion (1 Day); Future    7. GARCIA (dyspnea on exertion)  -     Stress Test With Myocardial Perfusion (1 Day); Future    Other orders  -     ECG 12 Lead        Plan    Orders Placed This Encounter   Procedures   • Stress Test With Myocardial Perfusion (1 Day)     Standing Status:   Future     Standing Expiration Date:   4/14/2023     Order Specific Question:   What stress agent will be used?     Answer:   Regadenoson (Lexiscan)     Order Specific Question:   Difficulty walking criteria?     Answer:   Musculoskeletal (hips, knees, feet, back, amputee)     Order Specific Question:   Reason for exam?     Answer:   Known Coronary Artery Disease     Order Specific Question:   Release to patient     Answer:   Immediate   • ECG 12 Lead     This order was created via procedure documentation     Order Specific Question:   Release to patient     Answer:   Immediate   Call for results of above testing.     Patient expressed understanding  Encouraged and answered all questions   Discussed with the patient and all questioned fully answered. He will call me if any problems arise.   Discussed results of prior testing with patient : echo   as well electrocardiogram from today      Check BP and heart rates twice daily initially till blood pressures and heart rates under good control and then at least 3x / week,   If blood pressures continue to be well-controlled then can check week a month  at home and bring a recording for review next visit  If BP >130/85  or < 100/60 persistently over 3 reading 30 mins apart or if heart rates persistently above 100 bpm or less than 55 bpm call sooner for evaluation and advise      Monitor cardiac rhythm device function regularly per established schedule or PRN       Keep LDL below 70 mg/dl. Monitor liver and renal functions.   Monitor CBC, CMP, TSH (as indicated) and Lipid Panel by primary      I support the patient's decision to take the Covid -19 vaccine   Had required complete course   No major issues   Now fully immunized    Had booster too      Follow up with Rose RENO , Jim RENO  or myself        Return in about 6 months (around 10/14/2022).

## 2022-04-18 ENCOUNTER — HOSPITAL ENCOUNTER (OUTPATIENT)
Dept: CARDIOLOGY | Facility: HOSPITAL | Age: 72
Discharge: HOME | End: 2022-04-18

## 2022-04-18 VITALS
WEIGHT: 184.97 LBS | HEIGHT: 66 IN | BODY MASS INDEX: 29.73 KG/M2 | HEART RATE: 62 BPM | SYSTOLIC BLOOD PRESSURE: 128 MMHG | DIASTOLIC BLOOD PRESSURE: 70 MMHG

## 2022-04-18 DIAGNOSIS — I25.10 CORONARY ARTERY DISEASE INVOLVING NATIVE CORONARY ARTERY OF NATIVE HEART WITHOUT ANGINA PECTORIS: ICD-10-CM

## 2022-04-18 DIAGNOSIS — R06.09 DOE (DYSPNEA ON EXERTION): ICD-10-CM

## 2022-04-18 PROCEDURE — 0 TECHNETIUM SESTAMIBI: Performed by: INTERNAL MEDICINE

## 2022-04-18 PROCEDURE — 25010000002 REGADENOSON 0.4 MG/5ML SOLUTION: Performed by: INTERNAL MEDICINE

## 2022-04-18 PROCEDURE — A9500 TC99M SESTAMIBI: HCPCS | Performed by: INTERNAL MEDICINE

## 2022-04-18 PROCEDURE — 78452 HT MUSCLE IMAGE SPECT MULT: CPT

## 2022-04-18 PROCEDURE — 93017 CV STRESS TEST TRACING ONLY: CPT

## 2022-04-18 PROCEDURE — 93018 CV STRESS TEST I&R ONLY: CPT | Performed by: INTERNAL MEDICINE

## 2022-04-18 PROCEDURE — 78452 HT MUSCLE IMAGE SPECT MULT: CPT | Performed by: INTERNAL MEDICINE

## 2022-04-18 RX ADMIN — TECHNETIUM TC 99M SESTAMIBI 1 DOSE: 1 INJECTION INTRAVENOUS at 09:10

## 2022-04-18 RX ADMIN — TECHNETIUM TC 99M SESTAMIBI 1 DOSE: 1 INJECTION INTRAVENOUS at 10:30

## 2022-04-18 RX ADMIN — REGADENOSON 0.4 MG: 0.08 INJECTION, SOLUTION INTRAVENOUS at 10:23

## 2022-04-23 LAB
BH CV NUCLEAR PRIOR STUDY: 3
BH CV REST NUCLEAR ISOTOPE DOSE: 10.2 MCI
BH CV STRESS BP STAGE 1: NORMAL
BH CV STRESS COMMENTS STAGE 1: NORMAL
BH CV STRESS DOSE REGADENOSON STAGE 1: 0.4
BH CV STRESS DURATION MIN STAGE 1: 0
BH CV STRESS DURATION SEC STAGE 1: 10
BH CV STRESS HR STAGE 1: 68
BH CV STRESS NUCLEAR ISOTOPE DOSE: 32 MCI
BH CV STRESS PROTOCOL 1: NORMAL
BH CV STRESS RECOVERY BP: NORMAL MMHG
BH CV STRESS RECOVERY HR: 60 BPM
BH CV STRESS STAGE 1: 1
LV EF NUC BP: 53 %
MAXIMAL PREDICTED HEART RATE: 149 BPM
PERCENT MAX PREDICTED HR: 45.64 %
STRESS BASELINE BP: NORMAL MMHG
STRESS BASELINE HR: 64 BPM
STRESS PERCENT HR: 54 %
STRESS POST EXERCISE DUR MIN: 0 MIN
STRESS POST EXERCISE DUR SEC: 10 SEC
STRESS POST PEAK BP: NORMAL MMHG
STRESS POST PEAK HR: 68 BPM
STRESS TARGET HR: 127 BPM

## 2022-05-03 ENCOUNTER — CLINICAL SUPPORT NO REQUIREMENTS (OUTPATIENT)
Dept: CARDIOLOGY | Facility: CLINIC | Age: 72
End: 2022-05-03

## 2022-05-03 DIAGNOSIS — I49.5 SICK SINUS SYNDROME: Chronic | ICD-10-CM

## 2022-05-03 DIAGNOSIS — Z95.0 PRESENCE OF CARDIAC PACEMAKER: Primary | Chronic | ICD-10-CM

## 2022-05-03 PROCEDURE — 93280 PM DEVICE PROGR EVAL DUAL: CPT | Performed by: INTERNAL MEDICINE

## 2022-10-20 NOTE — PROGRESS NOTES
Chief Complaint  Coronary Artery Disease (6mo F/U.) and SSS    Subjective          Emerson Sofia presents to [id number] for routine follow-up.  He was last seen in our office on 4/14/2022 at which time Stephanie scan was ordered and scheduled.  This was completed on 4/18/2022 and revealed no evidence of ischemia consistent with a low risk study.  He has coronary artery disease status post rotational ablation 2009 per Dr. John Broadbent, sick sinus syndrome status post pacemaker, hypertension and hyperlipidemia. He reports chronic dyspnea with moderate exertion, which he contributes to deconditioning and denies worsening. Patient denies chest pain, palpitations, dizziness, syncope, orthopnea, PND, edema or decreased stamina.  Patient denies any signs of bleeding.    Coronary Artery Disease  Presents for follow-up visit. Pertinent negatives include no chest pain, chest pressure, chest tightness, dizziness, leg swelling, muscle weakness, palpitations, shortness of breath or weight gain. Risk factors include hyperlipidemia. The symptoms have been stable. Compliance with diet is variable. Compliance with exercise is variable. Compliance with medications is good.   Hypertension  This is a chronic problem. The current episode started more than 1 year ago. The problem is controlled. Pertinent negatives include no anxiety, blurred vision, chest pain, headaches, malaise/fatigue, neck pain, orthopnea, palpitations, peripheral edema, PND, shortness of breath or sweats. Risk factors for coronary artery disease include male gender and dyslipidemia. Current antihypertension treatment includes beta blockers. The current treatment provides significant improvement. Hypertensive end-organ damage includes CAD/MI.   Hyperlipidemia  This is a chronic problem. The current episode started more than 1 year ago. Pertinent negatives include no chest pain or shortness of breath. Current antihyperlipidemic  "treatment includes statins. Risk factors for coronary artery disease include male sex, hypertension and dyslipidemia.     I have reviewed and confirmed the accuracy of the ROS  ROWDY Simon      Objective   Vital Signs:   /84   Pulse 70   Ht 167.6 cm (66\")   Wt 83 kg (183 lb)   SpO2 98%   BMI 29.54 kg/m²     Vitals and nursing note reviewed.   Constitutional:       General: Awake.      Appearance: Normal and healthy appearance. Well-developed, overweight and not in distress.   Eyes:      General: Lids are normal.      Conjunctiva/sclera: Conjunctivae normal.      Pupils: Pupils are equal, round, and reactive to light.   HENT:      Head: Normocephalic and atraumatic.      Nose: Nose normal.   Neck:      Vascular: No JVR. JVD normal.   Pulmonary:      Effort: Pulmonary effort is normal.      Breath sounds: Normal breath sounds. No wheezing. No rhonchi. No rales.   Chest:      Chest wall: Not tender to palpatation.   Cardiovascular:      PMI at left midclavicular line. Normal rate. Regular rhythm. Normal S1. Normal S2.      Murmurs: There is no murmur.      No gallop. No click. No rub.   Pulses:     Intact distal pulses.   Edema:     Peripheral edema absent.   Abdominal:      General: Bowel sounds are normal.      Palpations: Abdomen is soft.      Tenderness: There is no abdominal tenderness.   Musculoskeletal: Normal range of motion.         General: No tenderness.      Cervical back: Normal range of motion. Skin:     General: Skin is warm and dry.   Neurological:      General: No focal deficit present.      Mental Status: Alert, oriented to person, place, and time and oriented to person, place and time.   Psychiatric:         Attention and Perception: Attention and perception normal.         Mood and Affect: Mood and affect normal.         Speech: Speech normal.         Behavior: Behavior normal. Behavior is cooperative.         Thought Content: Thought content normal.         Cognition and " Memory: Cognition and memory normal.         Judgment: Judgment normal.        Result Review :   The following data was reviewed by: ROWDY Simon on 10/24/2022:  Common labs    Common Labs 4/8/22 4/8/22    1255 1255   Glucose  92   BUN  21   Creatinine  1.07   Sodium  139   Potassium  4.5   Chloride  103   Calcium  9.6   Albumin  3.70   Total Bilirubin  1.0   Alkaline Phosphatase  92   AST (SGOT)  23   ALT (SGPT)  27   WBC 6.76    Hemoglobin 15.5    Hematocrit 47.1    Platelets 164            Data reviewed: Cardiology studies 2d echo 11/24/20 and Stephanie scan 4/18/2022    ECG 12 Lead    Date/Time: 10/24/2022 11:03 AM  Performed by: Rose Whitten APRN  Authorized by: Rose Whitten APRN   Comparison: compared with previous ECG from 4/14/2022  Similar to previous ECG  Rhythm: paced  Rate: normal  BPM: 70    Clinical impression: abnormal EKG              Assessment and Plan    Diagnoses and all orders for this visit:    1. Coronary artery disease involving native coronary artery of native heart without angina pectoris (Primary)-no clinical signs of ischemia.  Stable.    2. History of coronary rotational ablation-2009 per Dr. García Uriarte.  Continue aspirin and Plavix.  Patient denies bleeding.    3. Sick sinus syndrome (HCC)-status post pacemaker.    4. Presence of cardiac pacemaker-interrogated 9/12/22. Three episodes of NSVT with longest duration of 2 seconds.  96.44 % a paced, 0.04% V paced, 0% A. fib burden.  Normal function, stable thresholds and adequate battery.     5. Primary hypertension-blood pressure is borderline if office today. Pt does not monitor routinely at home. Continue propranolol.  Monitor and record daily blood pressure. Report readings consistently higher than 130/90 or consistently lower than 100/60.     6. Hyperlipidemia LDL goal <70-management per PCP.  Pt reports well controlled. Continue atorvastatin.      Follow Up   Return in about 6 months (around 4/24/2023) for Next  scheduled follow up.  Patient was given instructions and counseling regarding his condition or for health maintenance advice. Please see specific information pulled into the AVS if appropriate.

## 2022-10-24 ENCOUNTER — OFFICE VISIT (OUTPATIENT)
Dept: CARDIOLOGY | Facility: CLINIC | Age: 72
End: 2022-10-24

## 2022-10-24 VITALS
SYSTOLIC BLOOD PRESSURE: 130 MMHG | HEIGHT: 66 IN | BODY MASS INDEX: 29.41 KG/M2 | OXYGEN SATURATION: 98 % | HEART RATE: 70 BPM | DIASTOLIC BLOOD PRESSURE: 84 MMHG | WEIGHT: 183 LBS

## 2022-10-24 DIAGNOSIS — I25.10 CORONARY ARTERY DISEASE INVOLVING NATIVE CORONARY ARTERY OF NATIVE HEART WITHOUT ANGINA PECTORIS: Primary | Chronic | ICD-10-CM

## 2022-10-24 DIAGNOSIS — E78.5 HYPERLIPIDEMIA LDL GOAL <70: Chronic | ICD-10-CM

## 2022-10-24 DIAGNOSIS — Z95.0 PRESENCE OF CARDIAC PACEMAKER: Chronic | ICD-10-CM

## 2022-10-24 DIAGNOSIS — I10 PRIMARY HYPERTENSION: Chronic | ICD-10-CM

## 2022-10-24 DIAGNOSIS — I49.5 SICK SINUS SYNDROME: Chronic | ICD-10-CM

## 2022-10-24 DIAGNOSIS — Z98.890: Chronic | ICD-10-CM

## 2022-10-24 PROCEDURE — 99214 OFFICE O/P EST MOD 30 MIN: CPT | Performed by: NURSE PRACTITIONER

## 2022-10-24 PROCEDURE — 93000 ELECTROCARDIOGRAM COMPLETE: CPT | Performed by: NURSE PRACTITIONER

## 2023-03-01 NOTE — PROGRESS NOTES
Subjective    Mr. Sofia is 73 y.o. male    Chief Complaint: Elevated PSA    History of Present Illness  Elevated PSA  Patient is here with an elevated PSA. The PSA was drawn1 month(s). He does not have a family history of prostate cancer. His AUA Symptom Score is 9 /35. Voiding symptoms include Incomplete emptying, Frequency, Intermittency, Weakened stream, Straining and Nocturia. Denies Urgency. Voiding symptoms began a few years ago years  . These have been gradual in onset. None.  Previous PSA values are :   5.9        02/2023  4.7        10/2021  4.8         08/2021   The following portions of the patient's history were reviewed and updated as appropriate: allergies, current medications, past family history, past medical history, past social history, past surgical history and problem list.    Review of Systems   Gastrointestinal: Negative.    Genitourinary: Positive for frequency. Negative for difficulty urinating and hematuria.   All other systems reviewed and are negative.        Current Outpatient Medications:   •  allopurinol (ZYLOPRIM) 300 MG tablet, Take 1 tablet by mouth 2 (Two) Times a Day., Disp: , Rfl:   •  aspirin 81 MG EC tablet, Aspir-81 mg tablet,delayed release  Take 1 tablet every day by oral route., Disp: , Rfl:   •  atorvastatin (LIPITOR) 20 MG tablet, atorvastatin 20 mg tablet, Disp: , Rfl:   •  celecoxib (CeleBREX) 200 MG capsule, Take 1 capsule by mouth 2 (Two) Times a Day., Disp: , Rfl:   •  clopidogrel (PLAVIX) 75 MG tablet, , Disp: , Rfl:   •  Cranberry 500 MG capsule, 2 (Two) Times a Day., Disp: , Rfl:   •  HYDROcodone-acetaminophen (NORCO)  MG per tablet, Take 1 tablet by mouth Every 6 (Six) Hours As Needed. for pain, Disp: , Rfl:   •  omega-3 acid ethyl esters (LOVAZA) 1 g capsule, Take 2 capsules by mouth 2 (Two) Times a Day., Disp: , Rfl:   •  propranolol (INDERAL) 20 MG tablet, Take 1 tablet by mouth 2 (Two) Times a Day., Disp: , Rfl:     Past Medical History:   Diagnosis Date  "  • Bradycardia    • Coronary artery disease involving native coronary artery without angina pectoris 10/23/2020   • History of coronary rotational ablation 10/23/2020    2009 per Dr. John Broadbent   • Hyperlipidemia LDL goal <70 11/2/2021   • Presence of cardiac pacemaker 10/23/2020   • Primary hypertension 11/2/2021   • Sick sinus syndrome (HCC) 10/23/2020       Past Surgical History:   Procedure Laterality Date   • CARDIAC ELECTROPHYSIOLOGY PROCEDURE N/A 4/8/2022    Procedure: PPM generator change - dual;  Surgeon: Jethro Liz MD;  Location:  [facility] LOC[facility];  Service: Cardiology;  Laterality: N/A;   • PACEMAKER IMPLANTATION     • ROTATOR CUFF REPAIR Right 10/2017   • TONSILLECTOMY         Social History     Socioeconomic History   • Marital status:    Tobacco Use   • Smoking status: Former     Types: Cigarettes   • Smokeless tobacco: Never   Vaping Use   • Vaping Use: Never used   Substance and Sexual Activity   • Alcohol use: Yes     Comment: occ   • Drug use: Never   • Sexual activity: Defer       No family history on file.    Objective    Temp 97.7 °F (36.5 °C)   Ht 167.6 cm (66\")   Wt 86.2 kg (190 lb)   BMI 30.67 kg/m²     Physical Exam  Vitals reviewed.   Constitutional:       Appearance: Normal appearance.   HENT:      Head: Normocephalic and atraumatic.   Pulmonary:      Effort: Pulmonary effort is normal.   Genitourinary:     Rectum: Normal.      Comments: Digital rectal exam revealed a smooth symmetric prostate no suspicious lesions nodules asymmetry or firmness were palpated.  Skin:     General: Skin is warm and dry.   Neurological:      Mental Status: He is alert and oriented to person, place, and time.   Psychiatric:         Mood and Affect: Mood normal.         Behavior: Behavior normal.             Results for orders placed or performed in visit on 03/06/23   POC Urinalysis Dipstick, Multipro    Specimen: Urine   Result Value Ref Range    Color Yellow Yellow, Straw, Dark " Yellow, [place]    Clarity, UA Clear Clear    Glucose, UA Negative Negative mg/dL    Bilirubin Negative Negative    Ketones, UA Negative Negative    Specific Gravity  1.030 1.005 - 1.030    Blood, UA Negative Negative    pH, Urine 5.0 5.0 - 8.0    Protein, POC Negative Negative mg/dL    Urobilinogen, UA Normal Normal, 0.2 E.U./dL    Nitrite, UA Negative Negative    Leukocytes Negative Negative     Assessment and Plan    Diagnoses and all orders for this visit:    1. Elevated prostate specific antigen (PSA) (Primary)  -     POC Urinalysis Dipstick, Multipro  -     PSA, Total & Free; Future    No obvious palpable abnormality on his digital rectal exam.  He had PSAs drawn 2021 that were 4.7 and 4.8 these were the first PSAs we had on record so there is no comparison PSAs his most recent PSA done 02/20/2023 was 5.9.  I explained this could be just normal progression of his PSA as he ages.  However I would like to recheck PSA in 4 months and do a PSA free and total.

## 2023-03-06 ENCOUNTER — OFFICE VISIT (OUTPATIENT)
Dept: UROLOGY | Facility: CLINIC | Age: 73
End: 2023-03-06
Payer: MEDICARE

## 2023-03-06 VITALS — TEMPERATURE: 97.7 F | HEIGHT: 66 IN | WEIGHT: 190 LBS | BODY MASS INDEX: 30.53 KG/M2

## 2023-03-06 DIAGNOSIS — R97.20 ELEVATED PROSTATE SPECIFIC ANTIGEN (PSA): Primary | ICD-10-CM

## 2023-03-06 LAB
BILIRUB BLD-MCNC: NEGATIVE MG/DL
CLARITY, POC: CLEAR
COLOR UR: YELLOW
GLUCOSE UR STRIP-MCNC: NEGATIVE MG/DL
KETONES UR QL: NEGATIVE
LEUKOCYTE EST, POC: NEGATIVE
NITRITE UR-MCNC: NEGATIVE MG/ML
PH UR: 5 [PH] (ref 5–8)
PROT UR STRIP-MCNC: NEGATIVE MG/DL
RBC # UR STRIP: NEGATIVE /UL
SP GR UR: 1.03 (ref 1–1.03)
UROBILINOGEN UR QL: NORMAL

## 2023-03-06 PROCEDURE — 99214 OFFICE O/P EST MOD 30 MIN: CPT | Performed by: PHYSICIAN ASSISTANT

## 2023-03-06 PROCEDURE — 81001 URINALYSIS AUTO W/SCOPE: CPT | Performed by: PHYSICIAN ASSISTANT

## 2023-03-06 RX ORDER — HYDROCODONE BITARTRATE AND ACETAMINOPHEN 10; 325 MG/1; MG/1
1 TABLET ORAL EVERY 6 HOURS PRN
COMMUNITY
Start: 2023-02-21

## 2023-03-11 PROCEDURE — G2066 INTER DEVC REMOTE 30D: HCPCS | Performed by: INTERNAL MEDICINE

## 2023-03-11 PROCEDURE — 93294 REM INTERROG EVL PM/LDLS PM: CPT | Performed by: INTERNAL MEDICINE

## 2023-04-20 ENCOUNTER — TELEPHONE (OUTPATIENT)
Dept: CARDIOLOGY | Facility: CLINIC | Age: 73
End: 2023-04-20
Payer: COMMERCIAL

## 2023-04-20 ENCOUNTER — TRANSCRIBE ORDERS (OUTPATIENT)
Dept: ADMINISTRATIVE | Facility: HOSPITAL | Age: 73
End: 2023-04-20
Payer: COMMERCIAL

## 2023-04-20 DIAGNOSIS — M75.122 NONTRAUMATIC COMPLETE TEAR OF ROTATOR CUFF, LEFT: Primary | ICD-10-CM

## 2023-04-20 NOTE — TELEPHONE ENCOUNTER
Patient is in need of MRI at [facility].  Cardiology orders obtained and faxed to radiology.  Scheduling is aware.

## 2023-04-28 ENCOUNTER — HOSPITAL ENCOUNTER (OUTPATIENT)
Dept: MRI IMAGING | Facility: HOSPITAL | Age: 73
Discharge: HOME | End: 2023-04-28
Payer: MEDICARE

## 2023-04-28 DIAGNOSIS — M75.122 NONTRAUMATIC COMPLETE TEAR OF ROTATOR CUFF, LEFT: ICD-10-CM

## 2023-04-28 PROCEDURE — 73221 MRI JOINT UPR EXTREM W/O DYE: CPT

## 2023-05-10 NOTE — PROGRESS NOTES
Chief Complaint  Coronary Artery Disease (6mo F/U) and Hypertension    Subjective          Emerson Sofia presents to [facility]OLOG[id number] for routine follow-up. He has coronary artery disease status post rotational ablation 2009 per Dr. John Broadbent, sick sinus syndrome status post pacemaker, hypertension and hyperlipidemia. He reports chronic dyspnea with moderate exertion, which he contributes to deconditioning and denies worsening. Patient denies chest pain, palpitations, dizziness, syncope, orthopnea, PND, edema or decreased stamina.  Patient denies any signs of bleeding.    Coronary Artery Disease  Presents for follow-up visit. Pertinent negatives include no chest pain, chest pressure, chest tightness, dizziness, leg swelling, muscle weakness, palpitations, shortness of breath or weight gain. Risk factors include hyperlipidemia. The symptoms have been stable. Compliance with diet is variable. Compliance with exercise is variable. Compliance with medications is good.   Hypertension  This is a chronic problem. The current episode started more than 1 year ago. The problem is controlled. Pertinent negatives include no anxiety, blurred vision, chest pain, headaches, malaise/fatigue, neck pain, orthopnea, palpitations, peripheral edema, PND, shortness of breath or sweats. Risk factors for coronary artery disease include male gender, dyslipidemia and obesity. Current antihypertension treatment includes beta blockers. The current treatment provides significant improvement. Hypertensive end-organ damage includes CAD/MI.   Hyperlipidemia  This is a chronic problem. The current episode started more than 1 year ago. Pertinent negatives include no chest pain or shortness of breath. Current antihyperlipidemic treatment includes statins. Risk factors for coronary artery disease include male sex, hypertension and dyslipidemia.     I have reviewed and confirmed the accuracy of the [facility]  "ROWDY Whitten    Objective   Vital Signs:   /84   Pulse 67   Ht 167.6 cm (66\")   Wt 86.2 kg (190 lb)   SpO2 97%   BMI 30.67 kg/m²     Vitals and nursing note reviewed.   Constitutional:       General: Awake.      Appearance: Normal and healthy appearance. Well-developed and not in distress. Obese.   Eyes:      General: Lids are normal.      Conjunctiva/sclera: Conjunctivae normal.      Pupils: Pupils are equal, round, and reactive to light.   HENT:      Head: Normocephalic and atraumatic.      Nose: Nose normal.   Neck:      Vascular: No JVR. JVD normal.   Pulmonary:      Effort: Pulmonary effort is normal.      Breath sounds: Normal breath sounds. No wheezing. No rhonchi. No rales.   Chest:      Chest wall: Not tender to palpatation.   Cardiovascular:      PMI at left midclavicular line. Normal rate. Regular rhythm. Normal S1. Normal S2.      Murmurs: There is no murmur.      No gallop. No click. No rub.   Pulses:     Intact distal pulses.   Edema:     Peripheral edema absent.   Abdominal:      General: Bowel sounds are normal.      Palpations: Abdomen is soft.      Tenderness: There is no abdominal tenderness.   Musculoskeletal: Normal range of motion.         General: No tenderness.      Cervical back: Normal range of motion. Skin:     General: Skin is warm and dry.   Neurological:      General: No focal deficit present.      Mental Status: Alert, oriented to person, place, and time and oriented to person, place and time.   Psychiatric:         Attention and Perception: Attention and perception normal.         Mood and Affect: Mood and affect normal.         Speech: Speech normal.         Behavior: Behavior normal. Behavior is cooperative.         Thought Content: Thought content normal.         Cognition and Memory: Cognition and memory normal.         Judgment: Judgment normal.        Result Review :   The following data was reviewed by: ROWDY Simon on 5/11/2023:    Data reviewed: " Cardiology studies 2d echo 11/24/20 and Stephanie scan 4/18/2022    ECG 12 Lead    Date/Time: 5/11/2023 11:24 AM  Performed by: Rose Whitten APRN  Authorized by: Rose Whitten APRN   Comparison: compared with previous ECG from 10/24/2022  Similar to previous ECG  Rhythm: paced  Rate: normal  BPM: 67    Clinical impression: abnormal EKG              Assessment and Plan    Diagnoses and all orders for this visit:    1. Coronary artery disease involving native coronary artery of native heart without angina pectoris (Primary)-no clinical signs of ischemia.  Stable.    2. History of coronary rotational ablation-2009 per Dr. García Uriarte.  Continue aspirin and Plavix.  Patient denies bleeding.    3. Sick sinus syndrome (HCC)-status post pacemaker.    4. Presence of cardiac pacemaker-interrogated 3/13/2023.  1 episode of nonsustained supraventricular tachycardia 5 seconds in duration.  95.74% A-paced, 0.05% V paced, 0% A-fib burden.  Normal function, stable thresholds and adequate battery. Interrogated in office today. 96.8% a-paced, <0.1% v-paced, no episodes. Full report pending.     5. Primary hypertension-blood pressure is mildly elevated in office today. Start losartan 25 mg daily. Continue propranolol.  Monitor and record daily blood pressure. Report readings consistently higher than 130/80 or consistently lower than 100/60.     6. Hyperlipidemia LDL goal <70-management per PCP.  Pt reports well controlled. Continue atorvastatin.    7. Class 1 obesity due to excess calories with serious comorbidity and body mass index (BMI) of 30.0 to 30.9 in adult- BMI is >= 30 and <35. (Class 1 Obesity). The following options were offered after discussion;: weight loss educational material (shared in after visit summary).     Follow Up   Return in about 6 months (around 11/11/2023) for Next scheduled follow up.  Patient was given instructions and counseling regarding his condition or for health maintenance advice. Please see  specific information pulled into the AVS if appropriate.

## 2023-05-11 ENCOUNTER — OFFICE VISIT (OUTPATIENT)
Dept: CARDIOLOGY | Facility: CLINIC | Age: 73
End: 2023-05-11
Payer: MEDICARE

## 2023-05-11 VITALS
DIASTOLIC BLOOD PRESSURE: 84 MMHG | OXYGEN SATURATION: 97 % | SYSTOLIC BLOOD PRESSURE: 126 MMHG | HEART RATE: 67 BPM | BODY MASS INDEX: 30.53 KG/M2 | HEIGHT: 66 IN | WEIGHT: 190 LBS

## 2023-05-11 DIAGNOSIS — I49.5 SICK SINUS SYNDROME: Chronic | ICD-10-CM

## 2023-05-11 DIAGNOSIS — I10 PRIMARY HYPERTENSION: Chronic | ICD-10-CM

## 2023-05-11 DIAGNOSIS — E78.5 HYPERLIPIDEMIA LDL GOAL <70: Chronic | ICD-10-CM

## 2023-05-11 DIAGNOSIS — I25.10 CORONARY ARTERY DISEASE INVOLVING NATIVE CORONARY ARTERY OF NATIVE HEART WITHOUT ANGINA PECTORIS: Primary | Chronic | ICD-10-CM

## 2023-05-11 DIAGNOSIS — Z95.0 PRESENCE OF CARDIAC PACEMAKER: Chronic | ICD-10-CM

## 2023-05-11 DIAGNOSIS — E66.09 CLASS 1 OBESITY DUE TO EXCESS CALORIES WITH SERIOUS COMORBIDITY AND BODY MASS INDEX (BMI) OF 30.0 TO 30.9 IN ADULT: ICD-10-CM

## 2023-05-11 DIAGNOSIS — Z98.890: Chronic | ICD-10-CM

## 2023-05-11 PROBLEM — E66.811 CLASS 1 OBESITY DUE TO EXCESS CALORIES WITH SERIOUS COMORBIDITY AND BODY MASS INDEX (BMI) OF 30.0 TO 30.9 IN ADULT: Status: ACTIVE | Noted: 2023-05-11

## 2023-05-11 PROCEDURE — 93000 ELECTROCARDIOGRAM COMPLETE: CPT | Performed by: NURSE PRACTITIONER

## 2023-05-11 PROCEDURE — 99214 OFFICE O/P EST MOD 30 MIN: CPT | Performed by: NURSE PRACTITIONER

## 2023-05-11 PROCEDURE — 3079F DIAST BP 80-89 MM HG: CPT | Performed by: NURSE PRACTITIONER

## 2023-05-11 PROCEDURE — 1160F RVW MEDS BY RX/DR IN RCRD: CPT | Performed by: NURSE PRACTITIONER

## 2023-05-11 PROCEDURE — 3074F SYST BP LT 130 MM HG: CPT | Performed by: NURSE PRACTITIONER

## 2023-05-11 PROCEDURE — 1159F MED LIST DOCD IN RCRD: CPT | Performed by: NURSE PRACTITIONER

## 2023-05-11 RX ORDER — LOSARTAN POTASSIUM 25 MG/1
25 TABLET ORAL DAILY
Qty: 90 TABLET | Refills: 3 | Status: SHIPPED | OUTPATIENT
Start: 2023-05-11

## 2023-11-09 NOTE — PROGRESS NOTES
Chief Complaint  Coronary Artery Disease (6mo F/U) and Hypertension    Subjective          Emerson Sofia presents to [facility] CARDIOLOGY for routine follow-up. He has coronary artery disease status post rotational ablation 2009 per Dr. John Broadbent, sick sinus syndrome status post pacemaker, hypertension and hyperlipidemia. He reports chronic dyspnea with moderate exertion, which he contributes to deconditioning and denies worsening. Patient denies chest pain, palpitations, dizziness, syncope, orthopnea, PND, edema or decreased stamina.  Patient denies any signs of bleeding.    Coronary Artery Disease  Presents for follow-up visit. Symptoms include shortness of breath. Pertinent negatives include no chest pain, chest pressure, chest tightness, dizziness, leg swelling, muscle weakness, palpitations or weight gain. Risk factors include hyperlipidemia. The symptoms have been stable. Compliance with diet is variable. Compliance with exercise is variable. Compliance with medications is good.   Hypertension  This is a chronic problem. The current episode started more than 1 year ago. The problem is controlled. Associated symptoms include shortness of breath. Pertinent negatives include no anxiety, blurred vision, chest pain, headaches, malaise/fatigue, neck pain, orthopnea, palpitations, peripheral edema, PND or sweats. Risk factors for coronary artery disease include male gender and dyslipidemia. Current antihypertension treatment includes beta blockers and angiotensin blockers. The current treatment provides significant improvement. Hypertensive end-organ damage includes CAD/MI.   Hyperlipidemia  This is a chronic problem. The current episode started more than 1 year ago. Associated symptoms include shortness of breath. Pertinent negatives include no chest pain. Current antihyperlipidemic treatment includes statins. Risk factors for coronary artery disease include male sex, hypertension and  "dyslipidemia.       Objective   Vital Signs:   /83   Pulse 80   Ht 170.2 cm (67\")   Wt 85.3 kg (188 lb)   SpO2 97%   BMI 29.44 kg/m²     Vitals and nursing note reviewed.   Constitutional:       General: Awake.      Appearance: Normal and healthy appearance. Well-developed, overweight and not in distress.   Eyes:      General: Lids are normal.      Conjunctiva/sclera: Conjunctivae normal.      Pupils: Pupils are equal, round, and reactive to light.   HENT:      Head: Normocephalic and atraumatic.      Nose: Nose normal.   Neck:      Vascular: No JVR. JVD normal.   Pulmonary:      Effort: Pulmonary effort is normal.      Breath sounds: Normal breath sounds. No wheezing. No rhonchi. No rales.   Chest:      Chest wall: Not tender to palpatation.   Cardiovascular:      PMI at left midclavicular line. Normal rate. Regular rhythm. Normal S1. Normal S2.       Murmurs: There is no murmur.      No gallop.  No click. No rub.   Pulses:     Intact distal pulses.   Edema:     Peripheral edema absent.   Abdominal:      General: Bowel sounds are normal.      Palpations: Abdomen is soft.      Tenderness: There is no abdominal tenderness.   Musculoskeletal: Normal range of motion.         General: No tenderness.      Cervical back: Normal range of motion. Skin:     General: Skin is warm and dry.   Neurological:      General: No focal deficit present.      Mental Status: Alert, oriented to person, place, and time and oriented to person, place and time.   Psychiatric:         Attention and Perception: Attention and perception normal.         Mood and Affect: Mood and affect normal.         Speech: Speech normal.         Behavior: Behavior normal. Behavior is cooperative.         Thought Content: Thought content normal.         Cognition and Memory: Cognition and memory normal.         Judgment: Judgment normal.        Result Review :   The following data was reviewed by: ROWDY Simon on 11/13/2023:    Data " reviewed: Cardiology studies 2d echo 11/24/20 and Stephanie scan 4/18/2022    ECG 12 Lead    Date/Time: 11/13/2023 10:50 AM  Performed by: Rose Whitten APRN    Authorized by: Rose Whitten APRN  Comparison: compared with previous ECG from 5/11/2023  Similar to previous ECG  Rhythm: paced  Rate: normal  BPM: 80    Clinical impression: abnormal EKG            Assessment and Plan    Diagnoses and all orders for this visit:    1. Coronary artery disease involving native coronary artery of native heart without angina pectoris (Primary)-no clinical signs of ischemia.  Stable.    2. History of coronary rotational ablation-2009 per Dr. García Uriarte.  Continue aspirin and Plavix.  Patient denies bleeding.    3. Sick sinus syndrome (HCC)-status post pacemaker.    4. Presence of cardiac pacemaker-interrogated 09/28/2023. 96.04% A-paced, 0.04% V paced, 0% A-fib burden.  Normal function, stable thresholds and adequate battery.     5. Primary hypertension-blood pressure is mildly elevated in office today, however pt reports consistently lower than 130/80 at home. Continue losartan and propranolol.  Monitor and record daily blood pressure. Report readings consistently higher than 130/80 or consistently lower than 100/60.     6. Hyperlipidemia LDL goal <70-management per PCP.  Pt reports well controlled. Continue atorvastatin.      Follow Up   Return in about 6 months (around 5/13/2024) for Next scheduled follow up.  Patient was given instructions and counseling regarding his condition or for health maintenance advice. Please see specific information pulled into the AVS if appropriate.

## 2023-11-13 ENCOUNTER — OFFICE VISIT (OUTPATIENT)
Dept: CARDIOLOGY | Facility: CLINIC | Age: 73
End: 2023-11-13
Payer: MEDICARE

## 2023-11-13 VITALS
OXYGEN SATURATION: 97 % | DIASTOLIC BLOOD PRESSURE: 83 MMHG | WEIGHT: 188 LBS | HEIGHT: 67 IN | BODY MASS INDEX: 29.51 KG/M2 | SYSTOLIC BLOOD PRESSURE: 132 MMHG | HEART RATE: 80 BPM

## 2023-11-13 DIAGNOSIS — Z98.890: Chronic | ICD-10-CM

## 2023-11-13 DIAGNOSIS — E78.5 HYPERLIPIDEMIA LDL GOAL <70: Chronic | ICD-10-CM

## 2023-11-13 DIAGNOSIS — I25.10 CORONARY ARTERY DISEASE INVOLVING NATIVE CORONARY ARTERY OF NATIVE HEART WITHOUT ANGINA PECTORIS: Primary | Chronic | ICD-10-CM

## 2023-11-13 DIAGNOSIS — Z95.0 PRESENCE OF CARDIAC PACEMAKER: Chronic | ICD-10-CM

## 2023-11-13 DIAGNOSIS — I49.5 SICK SINUS SYNDROME: Chronic | ICD-10-CM

## 2023-11-13 DIAGNOSIS — I10 PRIMARY HYPERTENSION: Chronic | ICD-10-CM

## 2023-11-13 PROCEDURE — 93000 ELECTROCARDIOGRAM COMPLETE: CPT | Performed by: NURSE PRACTITIONER

## 2023-11-13 PROCEDURE — 99214 OFFICE O/P EST MOD 30 MIN: CPT | Performed by: NURSE PRACTITIONER

## 2023-11-13 PROCEDURE — 3075F SYST BP GE 130 - 139MM HG: CPT | Performed by: NURSE PRACTITIONER

## 2023-11-13 PROCEDURE — 1159F MED LIST DOCD IN RCRD: CPT | Performed by: NURSE PRACTITIONER

## 2023-11-13 PROCEDURE — 1160F RVW MEDS BY RX/DR IN RCRD: CPT | Performed by: NURSE PRACTITIONER

## 2023-11-13 PROCEDURE — 3079F DIAST BP 80-89 MM HG: CPT | Performed by: NURSE PRACTITIONER

## 2023-11-13 RX ORDER — FLUTICASONE PROPIONATE 50 MCG
2 SPRAY, SUSPENSION (ML) NASAL AS NEEDED
COMMUNITY

## 2024-01-03 DIAGNOSIS — N52.9 ERECTILE DYSFUNCTION, UNSPECIFIED ERECTILE DYSFUNCTION TYPE: ICD-10-CM

## 2024-01-03 RX ORDER — TADALAFIL 5 MG/1
5 TABLET ORAL DAILY PRN
Qty: 30 TABLET | Refills: 11 | Status: SHIPPED | OUTPATIENT
Start: 2024-01-03 | End: 2024-01-04 | Stop reason: SDUPTHER

## 2024-01-04 DIAGNOSIS — N52.9 ERECTILE DYSFUNCTION, UNSPECIFIED ERECTILE DYSFUNCTION TYPE: ICD-10-CM

## 2024-01-04 RX ORDER — TADALAFIL 5 MG/1
5 TABLET ORAL DAILY PRN
Qty: 30 TABLET | Refills: 0 | Status: SHIPPED | OUTPATIENT
Start: 2024-01-04 | End: 2024-12-29

## 2024-02-01 NOTE — PROGRESS NOTES
Subjective    Mr. Sofia is 74 y.o. male    Chief Complaint: Elevated PSA    History of Present Illness  Patient is a 74-year-old gentleman who presents for month follow-up with history of elevated PSA.  He had PSAs drawn in 2021 that were 4.7 and 4.8 respectively.  No worsening symptoms.  No previous history of prostate cancer.  His PSA increased to 5.9 when I repeated it it was 5.2.  Digital rectal exams have been benign we had discussed MRI of the pelvis however I had him return with a repeat PSA which is now 4.0 which is lower than previous baselines.    Also with Cialis 20 mg which has worked well for his erectile dysfunction which he continues.    The following portions of the patient's history were reviewed and updated as appropriate: allergies, current medications, past family history, past medical history, past social history, past surgical history and problem list.    Review of Systems   Genitourinary:  Positive for difficulty urinating.         Current Outpatient Medications:     allopurinol (ZYLOPRIM) 300 MG tablet, Take 1 tablet by mouth 2 (Two) Times a Day., Disp: , Rfl:     aspirin 81 MG EC tablet, Aspir-81 mg tablet,delayed release  Take 1 tablet every day by oral route., Disp: , Rfl:     atorvastatin (LIPITOR) 20 MG tablet, atorvastatin 20 mg tablet, Disp: , Rfl:     Azelastine HCl 137 MCG/SPRAY solution, , Disp: , Rfl:     celecoxib (CeleBREX) 200 MG capsule, Take 1 capsule by mouth 2 (Two) Times a Day., Disp: , Rfl:     clopidogrel (PLAVIX) 75 MG tablet, , Disp: , Rfl:     Flovent  MCG/ACT inhaler, Inhale 2 puffs Every 12 (Twelve) Hours., Disp: , Rfl:     omega-3 acid ethyl esters (LOVAZA) 1 g capsule, Take 2 capsules by mouth 2 (Two) Times a Day., Disp: , Rfl:     omeprazole (priLOSEC) 20 MG capsule, Take 1 capsule by mouth Daily., Disp: , Rfl:     propranolol (INDERAL) 20 MG tablet, Take 1 tablet by mouth 2 (Two) Times a Day., Disp: , Rfl:     tadalafil (CIALIS) 5 MG tablet, Take 1 tablet  "by mouth Daily As Needed for Erectile Dysfunction for up to 360 days., Disp: 30 tablet, Rfl: 0    Past Medical History:   Diagnosis Date    Bradycardia     Coronary artery disease involving native coronary artery without angina pectoris 10/23/2020    History of coronary rotational ablation 10/23/2020    2009 per Dr. John Broadbent    Hyperlipidemia LDL goal <70 11/2/2021    Presence of cardiac pacemaker 10/23/2020    Primary hypertension 11/2/2021    Sick sinus syndrome 10/23/2020       Past Surgical History:   Procedure Laterality Date    CARDIAC ELECTROPHYSIOLOGY PROCEDURE N/A 4/8/2022    Procedure: PPM generator change - dual;  Surgeon: Jethro Liz MD;  Location:  [facility];  Service: Cardiology;  Laterality: N/A;    PACEMAKER IMPLANTATION      ROTATOR CUFF REPAIR Right 10/2017    TONSILLECTOMY         Social History     Socioeconomic History    Marital status:    Tobacco Use    Smoking status: Former     Types: Cigarettes    Smokeless tobacco: Never   Vaping Use    Vaping Use: Never used   Substance and Sexual Activity    Alcohol use: Yes     Comment: occ    Drug use: Never    Sexual activity: Defer       History reviewed. No pertinent family history.    Objective    Temp 97.7 °F (36.5 °C) (Temporal)   Ht 167.6 cm (66\")   Wt 83.6 kg (184 lb 6.4 oz)   BMI 29.76 kg/m²     Physical Exam  Vitals reviewed.   Constitutional:       Appearance: Normal appearance.   HENT:      Head: Normocephalic and atraumatic.   Pulmonary:      Effort: Pulmonary effort is normal.   Skin:     Coloration: Skin is not pale.   Neurological:      Mental Status: He is alert and oriented to person, place, and time.   Psychiatric:         Mood and Affect: Mood normal.         Behavior: Behavior normal.             Results for orders placed or performed in visit on 02/09/24   POC Urinalysis Dipstick, Multipro    Specimen: Urine   Result Value Ref Range    Color [place] Yellow, Straw, Dark Yellow, [place]    Clarity, " UA Clear Clear    Glucose, UA Negative Negative mg/dL    Bilirubin Small (1+) (A) Negative    Ketones, UA Trace (A) Negative    Specific Gravity  1.030 1.005 - 1.030    Blood, UA Negative Negative    pH, Urine 6.0 5.0 - 8.0    Protein, POC 30 mg/dL (A) Negative mg/dL    Urobilinogen, UA Normal Normal, 0.2 E.U./dL    Nitrite, UA Negative Negative    Leukocytes Negative Negative     Assessment and Plan    Diagnoses and all orders for this visit:    1. Elevated prostate specific antigen (PSA) (Primary)  -     POC Urinalysis Dipstick, Multipro  -     PSA DIAGNOSTIC; Future    2. Erectile dysfunction, unspecified erectile dysfunction type    His PSA has decreased and is now 4.0 which is slightly lower than his previous baselines therefore I do not think an MRI of the pelvis at this point is indicated also he has had benign digital rectal exams.  Recommend he follow-up 1 year PSA prior.    Cialis continues to do well for his erectile dysfunction.

## 2024-02-02 ENCOUNTER — LAB (OUTPATIENT)
Dept: UROLOGY | Facility: CLINIC | Age: 74
End: 2024-02-02
Payer: COMMERCIAL

## 2024-02-02 DIAGNOSIS — R97.20 ELEVATED PROSTATE SPECIFIC ANTIGEN (PSA): ICD-10-CM

## 2024-02-03 LAB — PSA SERPL-MCNC: 4.01 NG/ML (ref 0–4)

## 2024-02-09 ENCOUNTER — OFFICE VISIT (OUTPATIENT)
Dept: UROLOGY | Facility: CLINIC | Age: 74
End: 2024-02-09
Payer: MEDICARE

## 2024-02-09 VITALS — BODY MASS INDEX: 29.63 KG/M2 | HEIGHT: 66 IN | TEMPERATURE: 97.7 F | WEIGHT: 184.4 LBS

## 2024-02-09 DIAGNOSIS — N52.9 ERECTILE DYSFUNCTION, UNSPECIFIED ERECTILE DYSFUNCTION TYPE: ICD-10-CM

## 2024-02-09 DIAGNOSIS — R97.20 ELEVATED PROSTATE SPECIFIC ANTIGEN (PSA): Primary | ICD-10-CM

## 2024-02-09 LAB
BILIRUB BLD-MCNC: ABNORMAL MG/DL
CLARITY, POC: CLEAR
COLOR UR: ABNORMAL
GLUCOSE UR STRIP-MCNC: NEGATIVE MG/DL
KETONES UR QL: ABNORMAL
LEUKOCYTE EST, POC: NEGATIVE
NITRITE UR-MCNC: NEGATIVE MG/ML
PH UR: 6 [PH] (ref 5–8)
PROT UR STRIP-MCNC: ABNORMAL MG/DL
RBC # UR STRIP: NEGATIVE /UL
SP GR UR: 1.03 (ref 1–1.03)
UROBILINOGEN UR QL: NORMAL

## 2024-02-09 RX ORDER — AZELASTINE HYDROCHLORIDE 137 UG/1
SPRAY, METERED NASAL
COMMUNITY
Start: 2023-11-22

## 2024-02-09 RX ORDER — OMEPRAZOLE 20 MG/1
20 CAPSULE, DELAYED RELEASE ORAL DAILY
COMMUNITY
Start: 2024-02-04

## 2024-02-09 RX ORDER — FLUTICASONE PROPIONATE 220 UG/1
2 AEROSOL, METERED RESPIRATORY (INHALATION) EVERY 12 HOURS
COMMUNITY
Start: 2023-11-21 | End: 2024-11-15

## 2024-05-13 ENCOUNTER — OFFICE VISIT (OUTPATIENT)
Dept: CARDIOLOGY | Facility: CLINIC | Age: 74
End: 2024-05-13
Payer: MEDICARE

## 2024-05-13 VITALS
BODY MASS INDEX: 28.77 KG/M2 | WEIGHT: 179 LBS | HEIGHT: 66 IN | OXYGEN SATURATION: 98 % | HEART RATE: 85 BPM | DIASTOLIC BLOOD PRESSURE: 81 MMHG | SYSTOLIC BLOOD PRESSURE: 130 MMHG

## 2024-05-13 DIAGNOSIS — I10 PRIMARY HYPERTENSION: Chronic | ICD-10-CM

## 2024-05-13 DIAGNOSIS — Z98.890: Chronic | ICD-10-CM

## 2024-05-13 DIAGNOSIS — I49.5 SICK SINUS SYNDROME: Chronic | ICD-10-CM

## 2024-05-13 DIAGNOSIS — E78.5 HYPERLIPIDEMIA LDL GOAL <70: Chronic | ICD-10-CM

## 2024-05-13 DIAGNOSIS — Z95.0 PRESENCE OF CARDIAC PACEMAKER: Chronic | ICD-10-CM

## 2024-05-13 DIAGNOSIS — I25.10 CORONARY ARTERY DISEASE INVOLVING NATIVE CORONARY ARTERY OF NATIVE HEART WITHOUT ANGINA PECTORIS: Primary | Chronic | ICD-10-CM

## 2024-05-13 PROCEDURE — 3075F SYST BP GE 130 - 139MM HG: CPT | Performed by: NURSE PRACTITIONER

## 2024-05-13 PROCEDURE — 1159F MED LIST DOCD IN RCRD: CPT | Performed by: NURSE PRACTITIONER

## 2024-05-13 PROCEDURE — 3079F DIAST BP 80-89 MM HG: CPT | Performed by: NURSE PRACTITIONER

## 2024-05-13 PROCEDURE — 93000 ELECTROCARDIOGRAM COMPLETE: CPT | Performed by: NURSE PRACTITIONER

## 2024-05-13 PROCEDURE — 1160F RVW MEDS BY RX/DR IN RCRD: CPT | Performed by: NURSE PRACTITIONER

## 2024-05-13 PROCEDURE — 99214 OFFICE O/P EST MOD 30 MIN: CPT | Performed by: NURSE PRACTITIONER

## 2024-05-13 NOTE — PROGRESS NOTES
Chief Complaint  Coronary Artery Disease (6mo F/U), SSS, and Hypertension    Subjective          Emerson Sofia presents to [facility] CARDIOLOGY for routine follow-up. He has coronary artery disease status post rotational ablation 2009 per Dr. John Broadbent, sick sinus syndrome status post pacemaker, hypertension and hyperlipidemia. He continues to report chronic dyspnea with moderate exertion, which he contributes to deconditioning and denies worsening. Patient denies chest pain, palpitations, dizziness, syncope, orthopnea, PND, edema or decreased stamina.  Patient denies any signs of bleeding.    Coronary Artery Disease  Presents for follow-up visit. Symptoms include shortness of breath. Pertinent negatives include no chest pain, chest pressure, chest tightness, dizziness, leg swelling, muscle weakness, palpitations or weight gain. Risk factors include hyperlipidemia. The symptoms have been stable. Compliance with diet is variable. Compliance with exercise is variable. Compliance with medications is good.   Hypertension  This is a chronic problem. The current episode started more than 1 year ago. The problem is controlled. Associated symptoms include shortness of breath. Pertinent negatives include no anxiety, blurred vision, chest pain, headaches, malaise/fatigue, neck pain, orthopnea, palpitations, peripheral edema, PND or sweats. Risk factors for coronary artery disease include male gender and dyslipidemia. Current antihypertension treatment includes beta blockers and angiotensin blockers. The current treatment provides significant improvement. Hypertensive end-organ damage includes CAD/MI.   Hyperlipidemia  This is a chronic problem. The current episode started more than 1 year ago. Associated symptoms include shortness of breath. Pertinent negatives include no chest pain. Current antihyperlipidemic treatment includes statins. Risk factors for coronary artery disease include male sex,  "hypertension and dyslipidemia.     I have reviewed and confirmed the accuracy of the ROS  ROWDY Simon      Objective   Vital Signs:   /81   Pulse 85   Ht 167.6 cm (66\")   Wt 81.2 kg (179 lb)   SpO2 98%   BMI 28.89 kg/m²     Vitals and nursing note reviewed.   Constitutional:       General: Awake.      Appearance: Normal and healthy appearance. Well-developed, overweight and not in distress.   Eyes:      General: Lids are normal.      Conjunctiva/sclera: Conjunctivae normal.      Pupils: Pupils are equal, round, and reactive to light.   HENT:      Head: Normocephalic and atraumatic.      Nose: Nose normal.   Neck:      Vascular: No JVR. JVD normal.   Pulmonary:      Effort: Pulmonary effort is normal.      Breath sounds: Normal breath sounds. No wheezing. No rhonchi. No rales.   Chest:      Chest wall: Not tender to palpatation.   Cardiovascular:      PMI at left midclavicular line. Normal rate. Regular rhythm. Normal S1. Normal S2.       Murmurs: There is no murmur.      No gallop.  No click. No rub.   Pulses:     Intact distal pulses.   Edema:     Peripheral edema absent.   Abdominal:      General: Bowel sounds are normal.      Palpations: Abdomen is soft.      Tenderness: There is no abdominal tenderness.   Musculoskeletal: Normal range of motion.         General: No tenderness.      Cervical back: Normal range of motion. Skin:     General: Skin is warm and dry.   Neurological:      General: No focal deficit present.      Mental Status: Alert, oriented to person, place, and time and oriented to person, place and time.   Psychiatric:         Attention and Perception: Attention and perception normal.         Mood and Affect: Mood and affect normal.         Speech: Speech normal.         Behavior: Behavior normal. Behavior is cooperative.         Thought Content: Thought content normal.         Cognition and Memory: Cognition and memory normal.         Judgment: Judgment normal.        Result " Review :   The following data was reviewed by: ROWDY Simon on 05/13/2024:  Common labs          2/2/2024    12:08   Common Labs   PSA 4.010      Data reviewed: Cardiology studies 2d echo 11/24/20 and Stephanie scan 4/18/2022    ECG 12 Lead    Date/Time: 5/13/2024 1:51 PM  Performed by: Rose Whitten APRN    Authorized by: Rose Whitten APRN  Comparison: compared with previous ECG from 11/13/2023  Similar to previous ECG  Rhythm: paced  Rate: normal  BPM: 85  QRS axis: left    Clinical impression: abnormal EKG            Assessment and Plan    Diagnoses and all orders for this visit:    1. Coronary artery disease involving native coronary artery of native heart without angina pectoris (Primary)-no clinical signs of ischemia.  Stable.    2. History of coronary rotational ablation-2009 per Dr. John Broadbent.  Continue aspirin and Plavix.  Patient denies bleeding.    3. Sick sinus syndrome (HCC)-status post pacemaker.    4. Presence of cardiac pacemaker-interrogated 03/28/2024. 94.7% A-paced, 0.06% V paced, 0% A-fib burden.  1 episode of NSVT, 1 second in duration. Normal function, stable thresholds and adequate battery.     5. Primary hypertension-blood pressure is trivially elevated in office today, however pt reports consistently lower than 130/80 at home. Continue losartan and propranolol.  Monitor and record daily blood pressure. Report readings consistently higher than 130/80 or consistently lower than 100/60.     6. Hyperlipidemia LDL goal <70-management per PCP.  Pt reports well controlled. Continue atorvastatin.      Follow Up   Return in about 6 months (around 11/13/2024) for Next scheduled follow up.  Patient was given instructions and counseling regarding his condition or for health maintenance advice. Please see specific information pulled into the AVS if appropriate.

## 2024-08-06 NOTE — PROGRESS NOTES
Subjective    Mr. Sofia is 74 y.o. male    Chief Complaint: Elevated PSA    History of Present Illness  Patient is a 74-year-old gentleman who presents for follow-up with history of elevated PSA his PSA has has been erratic and he had a PSA February 2023 that was 5.9 months ago his PSA was just over 4.  Recent PSA is 6.6.  Patient reports no new or worsening voiding symptoms or complaints.  PSA 6.6 (7/31/2024)  The following portions of the patient's history were reviewed and updated as appropriate: allergies, current medications, past family history, past medical history, past social history, past surgical history and problem list.    Review of Systems   Genitourinary: Negative.          Current Outpatient Medications:     allopurinol (ZYLOPRIM) 300 MG tablet, Take 1 tablet by mouth 2 (Two) Times a Day., Disp: , Rfl:     aspirin 81 MG EC tablet, Aspir-81 mg tablet,delayed release  Take 1 tablet every day by oral route., Disp: , Rfl:     atorvastatin (LIPITOR) 20 MG tablet, atorvastatin 20 mg tablet, Disp: , Rfl:     celecoxib (CeleBREX) 200 MG capsule, Take 1 capsule by mouth 2 (Two) Times a Day., Disp: , Rfl:     clopidogrel (PLAVIX) 75 MG tablet, , Disp: , Rfl:     omega-3 acid ethyl esters (LOVAZA) 1 g capsule, Take 2 capsules by mouth 2 (Two) Times a Day., Disp: , Rfl:     omeprazole (priLOSEC) 20 MG capsule, Take 1 capsule by mouth Daily., Disp: , Rfl:     propranolol (INDERAL) 20 MG tablet, Take 1 tablet by mouth 2 (Two) Times a Day., Disp: , Rfl:     tadalafil (CIALIS) 5 MG tablet, Take 1 tablet by mouth Daily As Needed for Erectile Dysfunction for up to 360 days., Disp: 30 tablet, Rfl: 0    Past Medical History:   Diagnosis Date    Bradycardia     Coronary artery disease involving native coronary artery without angina pectoris 10/23/2020    History of coronary rotational ablation 10/23/2020    2009 per Dr. John Broadbent    Hyperlipidemia LDL goal <70 11/2/2021    Presence of cardiac pacemaker 10/23/2020     "Primary hypertension 11/2/2021    Sick sinus syndrome 10/23/2020       Past Surgical History:   Procedure Laterality Date    CARDIAC ELECTROPHYSIOLOGY PROCEDURE N/A 4/8/2022    Procedure: PPM generator change - dual;  Surgeon: Jethro Liz MD;  Location:  [facility] CATH [facility]VASIVE LOC[facility];  Service: Cardiology;  Laterality: N/A;    PACEMAKER IMPLANTATION      ROTATOR CUFF REPAIR Right 10/2017    TONSILLECTOMY         Social History     Socioeconomic History    Marital status:    Tobacco Use    Smoking status: Former     Types: Cigarettes    Smokeless tobacco: Never   Vaping Use    Vaping status: Never Used   Substance and Sexual Activity    Alcohol use: Yes     Comment: occ    Drug use: Never    Sexual activity: Defer       History reviewed. No pertinent family history.    Objective    Temp 98.7 °F (37.1 °C)   Ht 167.6 cm (66\")   Wt 82.1 kg (181 lb)   BMI 29.21 kg/m²     Physical Exam  Constitutional:       Appearance: Normal appearance.   HENT:      Head: Normocephalic and atraumatic.   Pulmonary:      Effort: Pulmonary effort is normal.   Genitourinary:     Rectum: Normal.      Comments: Digital rectal exam revealed a smooth symmetric prostate no suspicious lesions nodules asymmetry of firmness were palpated.  Skin:     Coloration: Skin is not pale.   Neurological:      Mental Status: He is alert.   Psychiatric:         Mood and Affect: Mood normal.         Behavior: Behavior normal.             Results for orders placed or performed in visit on 08/08/24   POC Urinalysis Dipstick, Multipro    Specimen: Urine   Result Value Ref Range    Color Yellow Yellow, Straw, Dark Yellow, [place]    Clarity, UA Clear Clear    Glucose, UA Negative Negative mg/dL    Bilirubin Negative Negative    Ketones, UA Negative Negative    Specific Gravity  1.025 1.005 - 1.030    Blood, UA Negative Negative    pH, Urine 6.0 5.0 - 8.0    Protein, POC Negative Negative mg/dL    Urobilinogen, UA 0.2 E.U./dL Normal, 0.2 E.U./dL    Nitrite, " UA Negative Negative    Leukocytes Negative Negative     Assessment and Plan    Diagnoses and all orders for this visit:    1. Elevated prostate specific antigen (PSA) (Primary)  -     POC Urinalysis Dipstick, Multipro  -     MRI Pelvis With & Without Contrast; Future    2. BPH without obstruction/lower urinary tract symptoms  -     MRI Pelvis With & Without Contrast; Future      PSA is increased to 6.6 that has been erratic in the past however given this recent increase from 4-6.6 I would like patient scheduled for MRI of the pelvis attention to the prostate.    I told patient I will review the MRI findings and contact him with results and follow-up.  Digital rectal exam was benign.

## 2024-08-08 ENCOUNTER — OFFICE VISIT (OUTPATIENT)
Dept: UROLOGY | Facility: CLINIC | Age: 74
End: 2024-08-08
Payer: MEDICARE

## 2024-08-08 VITALS — WEIGHT: 181 LBS | HEIGHT: 66 IN | BODY MASS INDEX: 29.09 KG/M2 | TEMPERATURE: 98.7 F

## 2024-08-08 DIAGNOSIS — N40.0 BPH WITHOUT OBSTRUCTION/LOWER URINARY TRACT SYMPTOMS: ICD-10-CM

## 2024-08-08 DIAGNOSIS — R97.20 ELEVATED PROSTATE SPECIFIC ANTIGEN (PSA): Primary | ICD-10-CM

## 2024-08-08 LAB
BILIRUB BLD-MCNC: NEGATIVE MG/DL
CLARITY, POC: CLEAR
COLOR UR: YELLOW
GLUCOSE UR STRIP-MCNC: NEGATIVE MG/DL
KETONES UR QL: NEGATIVE
LEUKOCYTE EST, POC: NEGATIVE
NITRITE UR-MCNC: NEGATIVE MG/ML
PH UR: 6 [PH] (ref 5–8)
PROT UR STRIP-MCNC: NEGATIVE MG/DL
RBC # UR STRIP: NEGATIVE /UL
SP GR UR: 1.02 (ref 1–1.03)
UROBILINOGEN UR QL: NORMAL

## 2024-08-21 ENCOUNTER — HOSPITAL ENCOUNTER (OUTPATIENT)
Dept: MRI IMAGING | Facility: HOSPITAL | Age: 74
Discharge: HOME | End: 2024-08-21
Admitting: PHYSICIAN ASSISTANT
Payer: MEDICARE

## 2024-08-21 DIAGNOSIS — R97.20 ELEVATED PROSTATE SPECIFIC ANTIGEN (PSA): ICD-10-CM

## 2024-08-21 DIAGNOSIS — N40.0 BPH WITHOUT OBSTRUCTION/LOWER URINARY TRACT SYMPTOMS: ICD-10-CM

## 2024-08-21 LAB — CREAT BLDA-MCNC: 1 MG/DL (ref 0.6–1.3)

## 2024-08-21 PROCEDURE — 72197 MRI PELVIS W/O & W/DYE: CPT

## 2024-08-21 PROCEDURE — 0 GADOBENATE DIMEGLUMINE 529 MG/ML SOLUTION: Performed by: PHYSICIAN ASSISTANT

## 2024-08-21 PROCEDURE — A9577 INJ MULTIHANCE: HCPCS | Performed by: PHYSICIAN ASSISTANT

## 2024-08-21 PROCEDURE — 82565 ASSAY OF CREATININE: CPT

## 2024-08-21 RX ADMIN — GADOBENATE DIMEGLUMINE 16 ML: 529 INJECTION, SOLUTION INTRAVENOUS at 11:15

## 2024-08-22 ENCOUNTER — TELEPHONE (OUTPATIENT)
Dept: UROLOGY | Facility: CLINIC | Age: 74
End: 2024-08-22
Payer: COMMERCIAL

## 2024-08-22 NOTE — TELEPHONE ENCOUNTER
Called pt. With results, pt. V/u. Pt. Transferred to [place] to make 6 month f/u with PSA prior.

## 2024-08-22 NOTE — TELEPHONE ENCOUNTER
----- Message from Compa Mckeon sent at 8/22/2024 11:41 AM CDT -----  Regarding: mri  MRI of the pelvis revealed no suspicious findings for prostate cancer on the MRI would recommend follow-up 6 months with PSA prior.  ----- Message -----  From: [employer], Rad Results [place] In  Sent: 8/22/2024   8:06 AM CDT  To: CHARLOTTE Gore

## 2024-12-09 ENCOUNTER — OFFICE VISIT (OUTPATIENT)
Dept: CARDIOLOGY | Facility: CLINIC | Age: 74
End: 2024-12-09
Payer: MEDICARE

## 2024-12-09 VITALS
SYSTOLIC BLOOD PRESSURE: 143 MMHG | BODY MASS INDEX: 29.89 KG/M2 | HEART RATE: 79 BPM | WEIGHT: 186 LBS | OXYGEN SATURATION: 98 % | DIASTOLIC BLOOD PRESSURE: 86 MMHG | HEIGHT: 66 IN

## 2024-12-09 DIAGNOSIS — I10 PRIMARY HYPERTENSION: Chronic | ICD-10-CM

## 2024-12-09 DIAGNOSIS — E66.09 CLASS 1 OBESITY DUE TO EXCESS CALORIES WITH SERIOUS COMORBIDITY AND BODY MASS INDEX (BMI) OF 30.0 TO 30.9 IN ADULT: ICD-10-CM

## 2024-12-09 DIAGNOSIS — E66.811 CLASS 1 OBESITY DUE TO EXCESS CALORIES WITH SERIOUS COMORBIDITY AND BODY MASS INDEX (BMI) OF 30.0 TO 30.9 IN ADULT: ICD-10-CM

## 2024-12-09 DIAGNOSIS — Z95.0 PRESENCE OF CARDIAC PACEMAKER: Chronic | ICD-10-CM

## 2024-12-09 DIAGNOSIS — Z98.890: Chronic | ICD-10-CM

## 2024-12-09 DIAGNOSIS — E78.5 HYPERLIPIDEMIA LDL GOAL <70: Chronic | ICD-10-CM

## 2024-12-09 DIAGNOSIS — I25.10 CORONARY ARTERY DISEASE INVOLVING NATIVE CORONARY ARTERY OF NATIVE HEART WITHOUT ANGINA PECTORIS: Primary | Chronic | ICD-10-CM

## 2024-12-09 DIAGNOSIS — I49.5 SICK SINUS SYNDROME: Chronic | ICD-10-CM

## 2024-12-09 PROCEDURE — 93000 ELECTROCARDIOGRAM COMPLETE: CPT | Performed by: NURSE PRACTITIONER

## 2024-12-09 PROCEDURE — 99214 OFFICE O/P EST MOD 30 MIN: CPT | Performed by: NURSE PRACTITIONER

## 2024-12-09 RX ORDER — ATORVASTATIN CALCIUM 40 MG/1
40 TABLET, FILM COATED ORAL NIGHTLY
Qty: 90 TABLET | Refills: 3 | Status: SHIPPED | OUTPATIENT
Start: 2024-12-09

## 2024-12-09 NOTE — PROGRESS NOTES
Chief Complaint  Coronary Artery Disease (6mo F/U), SSS, and Hypertension    Subjective          Emerson Sofia presents to [facility] for routine follow-up. He has coronary artery disease status post rotational ablation 2009 per Dr. John Broadbent, sick sinus syndrome status post pacemaker, hypertension, hyperlipidemia and obesity. He continues to report chronic dyspnea with moderate exertion, which he contributes to deconditioning and denies worsening. Patient denies chest pain, palpitations, dizziness, syncope, orthopnea, PND, edema or decreased stamina.  Patient denies any signs of bleeding.    Coronary Artery Disease  Presents for follow-up visit. Symptoms include shortness of breath. Pertinent negatives include no chest pain, chest pressure, chest tightness, dizziness, leg swelling, muscle weakness, palpitations or weight gain. Risk factors include hyperlipidemia. The symptoms have been stable. Compliance with diet is variable. Compliance with exercise is variable. Compliance with medications is good.   Hypertension  This is a chronic problem. The current episode started more than 1 year ago. The problem is controlled. Associated symptoms include shortness of breath. Pertinent negatives include no anxiety, blurred vision, chest pain, headaches, malaise/fatigue, neck pain, orthopnea, palpitations, peripheral edema, PND or sweats. Risk factors for coronary artery disease include male gender, dyslipidemia and obesity. Current antihypertension treatment includes beta blockers and angiotensin blockers. The current treatment provides significant improvement. Hypertensive end-organ damage includes CAD/MI.   Hyperlipidemia  This is a chronic problem. The current episode started more than 1 year ago. Associated symptoms include shortness of breath. Pertinent negatives include no chest pain. Current antihyperlipidemic treatment includes statins. Risk factors for coronary artery disease  "include male sex, hypertension and dyslipidemia.     I have reviewed and confirmed the accuracy of the ROS  ROWDY Simon    Objective   Vital Signs:   /86   Pulse 79   Ht 167.6 cm (66\")   Wt 84.4 kg (186 lb)   SpO2 98%   BMI 30.02 kg/m²     Vitals and nursing note reviewed.   Constitutional:       General: Awake.      Appearance: Normal and healthy appearance. Well-developed and not in distress. Obese.   Eyes:      General: Lids are normal.      Conjunctiva/sclera: Conjunctivae normal.      Pupils: Pupils are equal, round, and reactive to light.   HENT:      Head: Normocephalic and atraumatic.      Nose: Nose normal.   Neck:      Vascular: No JVR. JVD normal.   Pulmonary:      Effort: Pulmonary effort is normal.      Breath sounds: Normal breath sounds. No wheezing. No rhonchi. No rales.   Chest:      Chest wall: Not tender to palpatation.   Cardiovascular:      PMI at left midclavicular line. Normal rate. Regular rhythm. Normal S1. Normal S2.       Murmurs: There is no murmur.      No gallop.  No click. No rub.   Pulses:     Intact distal pulses.   Edema:     Peripheral edema absent.   Abdominal:      General: Bowel sounds are normal.      Palpations: Abdomen is soft.      Tenderness: There is no abdominal tenderness.   Musculoskeletal: Normal range of motion.         General: No tenderness.      Cervical back: Normal range of motion. Skin:     General: Skin is warm and dry.   Neurological:      General: No focal deficit present.      Mental Status: Alert, oriented to person, place, and time and oriented to person, place and time.   Psychiatric:         Attention and Perception: Attention and perception normal.         Mood and Affect: Mood and affect normal.         Speech: Speech normal.         Behavior: Behavior normal. Behavior is cooperative.         Thought Content: Thought content normal.         Cognition and Memory: Cognition and memory normal.         Judgment: Judgment normal.      "   Result Review :   The following data was reviewed by: ROWDY Simon on 12/9/2024:  Common labs          2/2/2024    12:08 8/21/2024    10:02   Common Labs   Creatinine  1.00    PSA 4.010       Data reviewed: Cardiology studies 2d echo 11/24/20 and Stephanie scan 4/18/2022    ECG 12 Lead    Date/Time: 12/9/2024 3:33 PM  Performed by: Rose Whitten APRN    Authorized by: Rose Whitten APRN  Comparison: compared with previous ECG from 5/13/2024  Similar to previous ECG  Rhythm: paced  Rate: normal  BPM: 79    Clinical impression: abnormal EKG            Assessment and Plan    Diagnoses and all orders for this visit:    1. Coronary artery disease involving native coronary artery of native heart without angina pectoris (Primary)-no clinical signs of ischemia.  Stable.    2. History of coronary rotational ablation-2009 per Dr. John Broadbent.  Continue aspirin and Plavix.  Patient denies bleeding.    3. Sick sinus syndrome (HCC)-status post pacemaker.    4. Presence of cardiac pacemaker-interrogated 9/26/2024. 94.17% A-paced, 0.04% V paced, 0% A-fib burden.  2 episodes of NSVT, 2 seconds in duration. Normal function, stable thresholds and adequate battery. Interrogated in office today. A-paced 95.1%, V-paced <0.1%, two brief (< 2 second) SVT episodes, rates 165-170 bpm. Normal function. Full report pending.     5. Primary hypertension-blood pressure is elevated in office today. Pt reports consistently lower than 130/80 at home. Continue losartan and propranolol.  Monitor and record daily blood pressure. Report readings consistently higher than 130/80 or consistently lower than 100/60.     6. Hyperlipidemia LDL goal <70-management per PCP.  LDL outside of goal range at 87 on lipid panel 7/31/24. Increase atorvastatin dose to 40 mg daily. Recheck lipid panel in three months per PCP.     7. Class 1 obesity due to excess calories with serious comorbidity and body mass index (BMI) of 30.0 to 30.9 in adult-  BMI is >= 30 and <35. (Class 1 Obesity). The following options were offered after discussion;: weight loss educational material (shared in after visit summary)    Follow Up   Return in about 6 months (around 6/9/2025) for Next scheduled follow up.  Patient was given instructions and counseling regarding his condition or for health maintenance advice. Please see specific information pulled into the AVS if appropriate.

## 2025-02-12 NOTE — THERAPY TREATMENT NOTE
Outpatient Physical Therapy Ortho Treatment Note  [facility]  Julia Reyes PTA  17  2:40 PM       Patient Name: Emerson Sofia  : 1950  MRN: [id number]  Today's Date: 2017      Visit Date: 2017    Subjective Improvement: 15-20%       Attendance:   Approved: medicare guidelines           MD follow up: 11/15/17          RC date: 17        Visit Dx:    ICD-10-CM ICD-9-CM   1. Traumatic complete tear of right rotator cuff, initial encounter S46.011A 840.4   2. S/P rotator cuff repair Z98.890 V45.89       There is no problem list on file for this patient.       Past Medical History:   Diagnosis Date   • High cholesterol         Past Surgical History:   Procedure Laterality Date   • PACEMAKER IMPLANTATION               PT Ortho       17 1300    Subjective Comments    Subjective Comments pt reports that the bruise is much better and pain level is down  -    Precautions and Contraindications    Precautions See Protocol  -    Contraindications Pacemaker: No IFC  -    Subjective Pain    Pre-Treatment Pain Level 2  -ARA    Right Shoulder    Flexion PROM Deficit 152  -ARA    ABduction PROM Deficit 153  -ARA    External Rotation PROM Deficit 70  -ARA      User Key  (r) = Recorded By, (t) = Taken By, (c) = Cosigned By    Initials Name Provider Type     Julia Reyes PTA Physical Therapy Assistant                            PT Assessment/Plan       17 1400       PT Assessment    Functional Limitations Decreased safety during functional activities;Limitation in home management;Limitations in community activities;Limitations in functional capacity and performance;Performance in leisure activities;Performance in self-care ADL;Performance in work activities  -     Impairments Edema;Joint mobility;Muscle strength;Pain;Posture;Range of motion  -     Assessment Comments pts motion improved since last visit- also pts bruising has deminished and pain  level is back to normal.   -ARA     Rehab Potential Good  -ARA     Patient/caregiver participated in establishment of treatment plan and goals Yes  -ARA     Patient would benefit from skilled therapy intervention Yes  -ARA     PT Plan    PT Frequency 2x/week;3x/week  -ARA     Predicted Duration of Therapy Intervention (days/wks) 8 weeks  -ARA     PT Plan Comments MD note sent. Cont per protocol  -ARA       User Key  (r) = Recorded By, (t) = Taken By, (c) = Cosigned By    Initials Name Provider Type    ARA Reyes PTA Physical Therapy Assistant                Modalities       11/13/17 1300          Ice    Ice Applied Yes  -ARA      Location R shoulder  -ARA      Rx Minutes 15 mins  -ARA      Ice S/P Rx Yes  -ARA        User Key  (r) = Recorded By, (t) = Taken By, (c) = Cosigned By    Initials Name Provider Type    ARA Reyes PTA Physical Therapy Assistant                Exercises       11/13/17 1300          Subjective Comments    Subjective Comments pt reports that the bruise is much better and pain level is down  -ARA      Subjective Pain    Able to rate subjective pain? yes  -ARA      Pre-Treatment Pain Level 2  -ARA      Aquatics    Aquatics performed? No  -ARA      Exercise 1    Exercise Name 1 Pendulums 4 way  -ARA      Time (Seconds) 1 1 min each  -ARA      Exercise 2    Exercise Name 2 Scap squeezes  -ARA      Sets 2 2  -ARA      Reps 2 10  -ARA      Time (Seconds) 2 5 sec hold  -ARA      Exercise 3    Exercise Name 3 Shoulder shrugs  -ARA      Sets 3 2  -ARA      Reps 3 10  -ARA      Time (Seconds) 3 5 sec hold  -ARA      Exercise 4    Exercise Name 4 AROM: elbow flexion/ext  -ARA      Reps 4 30  -ARA      Exercise 5    Exercise Name 5 AROM: pro/sup  -ARA      Sets 5 2  -ARA      Reps 5 15  -ARA      Exercise 6    Exercise Name 6 Wedge slides FF/scap  -ARA      Reps 6 30   ea  -ARA      Exercise 7    Exercise Name 7 Tputty:   -ARA      Time (Minutes) 7 3 min  -ARA      Additional Comments red  -ARA        User Key   (r) = Recorded By, (t) = Taken By, (c) = Cosigned By    Initials Name Provider Type    ARA Reyes PTA Physical Therapy Assistant                        Manual Rx (last 36 hours)      Manual Treatments       11/13/17 1300          Manual Rx 1    Manual Rx 1 Location PROM L shoulder  -      Manual Rx 1 Type STM/Gentle PROM per protocol  -      Manual Rx 1 Duration 15 min  -        User Key  (r) = Recorded By, (t) = Taken By, (c) = Cosigned By    Initials Name Provider Type    ARA CHÁVEZ ANALIA Reyes Physical Therapy Assistant                PT OP Goals       11/13/17 1400       PT Short Term Goals    STG Date to Achieve 11/21/17  -     STG 1 Patient will be IND and compliant with HEP  -     STG 1 Progress Progressing  -     STG 2 Patient will have PROM flexion to 160 degrees  -     STG 2 Progress Progressing  -     STG 3 Patient will have PROM abduction to 130 degrees  -     STG 3 Progress Met  -     STG 4 Patient will have PROM ER to 30 degrees  -[facility] 4 Progress Met  -     STG 5 Patient will reduce Quick Dash to 40  -[facility] 5 Progress Progressing  -     Long Term Goals    LTG Date to Achieve 12/12/17  -     LTG 1 Patient will reduce Quick Dash to 20  -     LTG 1 Progress Ongoing  -     LTG 2 Patient will have R shld AROM flexion to 155 degrees  -     LTG 2 Progress Ongoing  -     LTG 3 Patient will have R shld AROM abduction to 150 degrees  -     LTG 3 Progress Ongoing  -     LTG 4 Patient will have active ER to 50 degrees measured at side  -     LTG 4 Progress Ongoing  [facility]     LTG 5 Patient will have IR functional reach to L4/L5  -[facility] 5 Progress Ongoing  -     LTG 6 Patient will be able to tolerate 45 minute treatment session without increased pain on VAS  -[facility] 6 Progress [facility]     Time Calculation    PT Goal Re-Cert Due Date 11/30/17  -       User Key  (r) = Recorded By, (t) = Taken By, (c) = Cosigned By    Initials Name  Provider Type    ARA Reyes PTA Physical Therapy Assistant                Therapy Education       11/13/17 1400          Therapy Education    Given HEP;Symptoms/condition management;Pain management;Posture/body mechanics  -ARA      Program Reinforced  -ARA      How Provided Verbal;Demonstration;Written  -ARA      Provided to Patient  -ARA      Level of Understanding Verbalized;Demonstrated  -ARA        User Key  (r) = Recorded By, (t) = Taken By, (c) = Cosigned By    Initials Name Provider Type    ARA Reyes PTA Physical Therapy Assistant                Time Calculation:   Start Time: 1345  Stop Time: 1442  Time Calculation (min): 57 min  Total Timed Code Minutes- PT: 42 minute(s)    Therapy Charges for Today     Code Description Service Date Service Provider Modifiers Qty    40114819965 HC PT THER PROC EA 15 MIN 11/13/2017 Julia Reyes PTA GP 2    32233854609 HC PT MANUAL THERAPY EA 15 MIN 11/13/2017 Julia Reyes PTA GP 1    36931114270 HC PT THER SUPP EA 15 MIN 11/13/2017 Julia Reyes PTA GP 1                    Julia Reyes PTA  11/13/2017        Awake

## 2025-02-13 NOTE — PROGRESS NOTES
Subjective    Mr. Sofia is 75 y.o. male    Chief Complaint: Elevated PSA    History of Present Illness  Patient with history of elevated PSA here for 6-month follow-up.  In the past PSA has been erratic.  His most recent PSA is 4.010.  PSA was 6.6 July 2024.  February 2023 his PSA was 5.9.  He had an MRI of the prostate August 2024 that revealed no suspicious PI-RADS 3 or greater lesions.  Symptom wise patient is stable and has no urologic complaints he is on no urologic medications.    The following portions of the patient's history were reviewed and updated as appropriate: allergies, current medications, past family history, past medical history, past social history, past surgical history and problem list.    Review of Systems   Genitourinary: Negative.          Current Outpatient Medications:     allopurinol (ZYLOPRIM) 300 MG tablet, Take 1 tablet by mouth 2 (Two) Times a Day., Disp: , Rfl:     aspirin 81 MG EC tablet, Aspir-81 mg tablet,delayed release  Take 1 tablet every day by oral route., Disp: , Rfl:     atorvastatin (LIPITOR) 40 MG tablet, Take 1 tablet by mouth Every Night., Disp: 90 tablet, Rfl: 3    clopidogrel (PLAVIX) 75 MG tablet, , Disp: , Rfl:     omeprazole (priLOSEC) 20 MG capsule, Take 1 capsule by mouth Daily., Disp: , Rfl:     propranolol (INDERAL) 20 MG tablet, Take 1 tablet by mouth 3 (Three) Times a Day., Disp: , Rfl:     celecoxib (CeleBREX) 200 MG capsule, Take 1 capsule by mouth 2 (Two) Times a Day. (Patient not taking: Reported on 2/24/2025), Disp: , Rfl:     Past Medical History:   Diagnosis Date    Bradycardia     Coronary artery disease involving native coronary artery without angina pectoris 10/23/2020    History of coronary rotational ablation 10/23/2020    2009 per Dr. John Broadbent    Hyperlipidemia LDL goal <70 11/2/2021    Presence of cardiac pacemaker 10/23/2020    Primary hypertension 11/2/2021    Sick sinus syndrome 10/23/2020       Past Surgical History:   Procedure  "Laterality Date    CARDIAC ELECTROPHYSIOLOGY PROCEDURE N/A 4/8/2022    Procedure: PPM generator change - dual;  Surgeon: Jethro Liz MD;  Location:  [facility]ATH [facility]VAS[facility] LOC[facility];  Service: Cardiology;  Laterality: N/A;    PACEMAKER IMPLANTATION      ROTATOR CUFF REPAIR Right 10/2017    TONSILLECTOMY         Social History     Socioeconomic History    Marital status:    Tobacco Use    Smoking status: Former     Types: Cigarettes     Passive exposure: Past    Smokeless tobacco: Never   Vaping Use    Vaping status: Never Used   Substance and Sexual Activity    Alcohol use: Yes     Comment: occ    Drug use: Never    Sexual activity: Defer       History reviewed. No pertinent family history.    Objective    Temp 97.8 °F (36.6 °C) (Infrared)   Ht 167.6 cm (66\")   Wt 83.1 kg (183 lb 3.2 oz)   BMI 29.57 kg/m²     Physical Exam  Vitals reviewed.   Constitutional:       Appearance: Normal appearance.   HENT:      Head: Normocephalic and atraumatic.   Pulmonary:      Effort: Pulmonary effort is normal.   Skin:     Coloration: Skin is not pale.   Neurological:      Mental Status: He is alert.   Psychiatric:         Mood and Affect: Mood normal.         Behavior: Behavior normal.             Results for orders placed or performed in visit on 02/24/25   POC Urinalysis Dipstick, Multipro    Collection Time: 02/24/25  9:00 AM    Specimen: Urine   Result Value Ref Range    Color Yellow Yellow, Straw, Dark Yellow, [place]    Clarity, UA Clear Clear    Glucose, UA Negative Negative mg/dL    Bilirubin Negative Negative    Ketones, UA Negative Negative    Specific Gravity  1.030 1.005 - 1.030    Blood, UA Negative Negative    pH, Urine 6.0 5.0 - 8.0    Protein, POC 30 mg/dL (A) Negative mg/dL    Urobilinogen, UA 1 E.U./dL (A) Normal, 0.2 E.U./dL    Nitrite, UA Negative Negative    Leukocytes Negative Negative   IPSS Questionnaire (AUA-7):  Incomplete emptying  Over the past month, how often have you had a sensation of not " emptying your bladder completely after you finished urinating?: Less than 1 time in 5 (02/24/25 0855)  Frequency  Over the past month, how often have you had to urinate again less than two hours after you finishied urinating ?: Not at all (02/24/25 0855)  Intermittency  Over the past month, how often have you found you stopped and started again several time when you urinated ?: Less than 1 time in 5 (02/24/25 0855)  Urgency  Over the last month, how often have you found it difficult  have you found it difficult to postpone urination ?: Less than 1 time in 5 (02/24/25 0855)  Weak Stream  Over the past month, how often have you had a weak urinary stream ?: Less than 1 time in 5 (02/24/25 0855)  Straining  Over the past month, how often have you had to push or strain to begin urination ?: Not at all (02/24/25 0855)  Nocturia  Over the past month, how many times did you most typically get up to urinate from the time you went to bed until the time you got up in the morning ?: 2 times (02/24/25 0855)  Quality of life due to urinary symptoms  If you were to spend the rest of your life with your urinary condition the way it is now, how would feel about that?: Mostly satisfied (02/24/25 0855)    Scores  Total IPSS Score: 6 (02/24/25 0855)  Total Score = Symptomatic Level: Mildly symptomatic: 0-7 (02/24/25 0855)       Assessment and Plan    Diagnoses and all orders for this visit:    1. Elevated prostate specific antigen (PSA) (Primary)  -     POC Urinalysis Dipstick, Multipro  -     PSA DIAGNOSTIC; Future      PSA stabilize it is 4.010 which is unchanged from 1 year ago however he had increase of 6.6 July 2024 with a negative MRI.  I feel given his history of erratic PSAs I feel he can be seen 1 year PSA prior.

## 2025-02-20 ENCOUNTER — TELEPHONE (OUTPATIENT)
Dept: UROLOGY | Facility: CLINIC | Age: 75
End: 2025-02-20
Payer: COMMERCIAL

## 2025-02-20 NOTE — TELEPHONE ENCOUNTER
Called Patient to remind them to get PSA prior to appointment.  Spoke with Patient.  If patient calls back it is ok for the HUB to tell the pt the message.    
R Total knee replacement/yes

## 2025-02-21 ENCOUNTER — LAB (OUTPATIENT)
Dept: LAB | Facility: HOSPITAL | Age: 75
End: 2025-02-21
Payer: MEDICARE

## 2025-02-21 DIAGNOSIS — R97.20 ELEVATED PROSTATE SPECIFIC ANTIGEN (PSA): ICD-10-CM

## 2025-02-21 PROCEDURE — 84153 ASSAY OF PSA TOTAL: CPT

## 2025-02-21 PROCEDURE — 36415 COLL VENOUS BLD VENIPUNCTURE: CPT

## 2025-02-22 LAB — PSA SERPL-MCNC: 4.01 NG/ML (ref 0–4)

## 2025-02-24 ENCOUNTER — OFFICE VISIT (OUTPATIENT)
Dept: UROLOGY | Facility: CLINIC | Age: 75
End: 2025-02-24
Payer: MEDICARE

## 2025-02-24 VITALS — HEIGHT: 66 IN | TEMPERATURE: 97.8 F | BODY MASS INDEX: 29.44 KG/M2 | WEIGHT: 183.2 LBS

## 2025-02-24 DIAGNOSIS — R97.20 ELEVATED PROSTATE SPECIFIC ANTIGEN (PSA): Primary | ICD-10-CM

## 2025-02-24 LAB
BILIRUB BLD-MCNC: NEGATIVE MG/DL
CLARITY, POC: CLEAR
COLOR UR: YELLOW
GLUCOSE UR STRIP-MCNC: NEGATIVE MG/DL
KETONES UR QL: NEGATIVE
LEUKOCYTE EST, POC: NEGATIVE
NITRITE UR-MCNC: NEGATIVE MG/ML
PH UR: 6 [PH] (ref 5–8)
PROT UR STRIP-MCNC: ABNORMAL MG/DL
RBC # UR STRIP: NEGATIVE /UL
SP GR UR: 1.03 (ref 1–1.03)
UROBILINOGEN UR QL: ABNORMAL

## 2025-02-24 PROCEDURE — 1160F RVW MEDS BY RX/DR IN RCRD: CPT | Performed by: PHYSICIAN ASSISTANT

## 2025-02-24 PROCEDURE — 81003 URINALYSIS AUTO W/O SCOPE: CPT | Performed by: PHYSICIAN ASSISTANT

## 2025-02-24 PROCEDURE — 1159F MED LIST DOCD IN RCRD: CPT | Performed by: PHYSICIAN ASSISTANT

## 2025-02-24 PROCEDURE — 99213 OFFICE O/P EST LOW 20 MIN: CPT | Performed by: PHYSICIAN ASSISTANT

## 2025-03-27 PROCEDURE — 93294 REM INTERROG EVL PM/LDLS PM: CPT | Performed by: INTERNAL MEDICINE

## 2025-03-27 PROCEDURE — 93296 REM INTERROG EVL PM/IDS: CPT | Performed by: INTERNAL MEDICINE

## 2025-06-09 ENCOUNTER — OFFICE VISIT (OUTPATIENT)
Dept: CARDIOLOGY | Facility: CLINIC | Age: 75
End: 2025-06-09
Payer: MEDICARE

## 2025-06-09 VITALS
HEART RATE: 81 BPM | HEIGHT: 66 IN | SYSTOLIC BLOOD PRESSURE: 127 MMHG | OXYGEN SATURATION: 98 % | DIASTOLIC BLOOD PRESSURE: 80 MMHG | BODY MASS INDEX: 28.93 KG/M2 | WEIGHT: 180 LBS

## 2025-06-09 DIAGNOSIS — E78.5 HYPERLIPIDEMIA LDL GOAL <70: Chronic | ICD-10-CM

## 2025-06-09 DIAGNOSIS — I49.5 SICK SINUS SYNDROME: Chronic | ICD-10-CM

## 2025-06-09 DIAGNOSIS — Z95.0 PRESENCE OF CARDIAC PACEMAKER: Chronic | ICD-10-CM

## 2025-06-09 DIAGNOSIS — Z98.890: Chronic | ICD-10-CM

## 2025-06-09 DIAGNOSIS — I25.10 CORONARY ARTERY DISEASE INVOLVING NATIVE CORONARY ARTERY OF NATIVE HEART WITHOUT ANGINA PECTORIS: Primary | Chronic | ICD-10-CM

## 2025-06-09 DIAGNOSIS — I10 PRIMARY HYPERTENSION: Chronic | ICD-10-CM

## 2025-06-09 PROCEDURE — 99214 OFFICE O/P EST MOD 30 MIN: CPT | Performed by: NURSE PRACTITIONER

## 2025-06-09 PROCEDURE — 1159F MED LIST DOCD IN RCRD: CPT | Performed by: NURSE PRACTITIONER

## 2025-06-09 PROCEDURE — 93000 ELECTROCARDIOGRAM COMPLETE: CPT | Performed by: NURSE PRACTITIONER

## 2025-06-09 PROCEDURE — 1160F RVW MEDS BY RX/DR IN RCRD: CPT | Performed by: NURSE PRACTITIONER

## 2025-06-09 PROCEDURE — 3074F SYST BP LT 130 MM HG: CPT | Performed by: NURSE PRACTITIONER

## 2025-06-09 PROCEDURE — 3079F DIAST BP 80-89 MM HG: CPT | Performed by: NURSE PRACTITIONER

## 2025-06-09 RX ORDER — TADALAFIL 10 MG/1
10 TABLET ORAL DAILY PRN
Qty: 30 TABLET | Refills: 3 | Status: SHIPPED | OUTPATIENT
Start: 2025-06-09

## 2025-06-09 NOTE — PROGRESS NOTES
Chief Complaint  Coronary Artery Disease (6mo F/U) and Hypertension    Subjective          Emerson Sofia presents to [facility] for routine follow-up. He has coronary artery disease status post rotational ablation 2009 per Dr. John Broadbent, sick sinus syndrome status post pacemaker, hypertension, hyperlipidemia and former obesity. He continues to report chronic dyspnea with moderate exertion, which he contributes to deconditioning and denies worsening. Patient denies chest pain, palpitations, dizziness, syncope, orthopnea, PND, edema or decreased stamina.  Patient denies any signs of bleeding.    Coronary Artery Disease  Presents for follow-up visit. Symptoms include shortness of breath. Pertinent negatives include no chest pain, chest pressure, chest tightness, dizziness, leg swelling, muscle weakness, palpitations or weight gain. Risk factors include hyperlipidemia. The symptoms have been stable. Compliance with diet is variable. Compliance with exercise is variable. Compliance with medications is good.   Hypertension  This is a chronic problem. The current episode started more than 1 year ago. The problem is controlled. Associated symptoms include shortness of breath. Pertinent negatives include no anxiety, blurred vision, chest pain, headaches, malaise/fatigue, neck pain, orthopnea, palpitations, peripheral edema, PND or sweats. Risk factors for coronary artery disease include male gender and dyslipidemia. Current antihypertension treatment includes beta blockers and angiotensin blockers. The current treatment provides significant improvement. Hypertensive end-organ damage includes CAD/MI.   Hyperlipidemia  This is a chronic problem. The current episode started more than 1 year ago. Associated symptoms include shortness of breath. Pertinent negatives include no chest pain. Current antihyperlipidemic treatment includes statins. Risk factors for coronary artery disease include male  "sex, hypertension and dyslipidemia.     I have reviewed and confirmed the accuracy of the ROS  ROWDY Simon    Objective   Vital Signs:   /80   Pulse 81   Ht 167.6 cm (66\")   Wt 81.6 kg (180 lb)   SpO2 98%   BMI 29.05 kg/m²     Vitals and nursing note reviewed.   Constitutional:       General: Awake.      Appearance: Normal and healthy appearance. Well-developed, overweight and not in distress.   Eyes:      General: Lids are normal.      Conjunctiva/sclera: Conjunctivae normal.      Pupils: Pupils are equal, round, and reactive to light.   HENT:      Head: Normocephalic and atraumatic.      Nose: Nose normal.   Neck:      Vascular: No JVR. JVD normal.   Pulmonary:      Effort: Pulmonary effort is normal.      Breath sounds: Normal breath sounds. No wheezing. No rhonchi. No rales.   Chest:      Chest wall: Not tender to palpatation.   Cardiovascular:      PMI at left midclavicular line. Normal rate. Regular rhythm. Normal S1. Normal S2.       Murmurs: There is no murmur.      No gallop.  No click. No rub.   Pulses:     Intact distal pulses.   Edema:     Peripheral edema absent.   Abdominal:      General: Bowel sounds are normal.      Palpations: Abdomen is soft.      Tenderness: There is no abdominal tenderness.   Musculoskeletal: Normal range of motion.         General: No tenderness.      Cervical back: Normal range of motion. Skin:     General: Skin is warm and dry.   Neurological:      General: No focal deficit present.      Mental Status: Alert, oriented to person, place, and time and oriented to person, place and time.   Psychiatric:         Attention and Perception: Attention and perception normal.         Mood and Affect: Mood and affect normal.         Speech: Speech normal.         Behavior: Behavior normal. Behavior is cooperative.         Thought Content: Thought content normal.         Cognition and Memory: Cognition and memory normal.         Judgment: Judgment normal.      "   Result Review :   The following data was reviewed by: ROWDY Simon on 06/09/2025:  Common labs          8/21/2024    10:02 2/21/2025    13:30   Common Labs   Creatinine 1.00     PSA  4.010      Data reviewed: Cardiology studies 2d echo 11/24/20 and Stephanie scan 4/18/2022    ECG 12 Lead    Date/Time: 6/9/2025 2:04 PM  Performed by: Rose Whitten APRN    Authorized by: Rose Whitten APRN  Comparison: compared with previous ECG from 12/9/2024  Rhythm: paced  Rate: normal  BPM: 81  Conduction: left bundle branch block  QRS axis: normal    Clinical impression: abnormal EKG            Assessment and Plan    Diagnoses and all orders for this visit:    1. Coronary artery disease involving native coronary artery of native heart without angina pectoris (Primary)-no clinical signs of ischemia.  Stable.     2. History of coronary rotational ablation-2009 per Dr. John Broadbent.  Pt  continues on aspirin and Plavix.  Patient denies bleeding.    3. Sick sinus syndrome (HCC)-status post pacemaker.    4. Presence of cardiac pacemaker-interrogated 03/26/2025. 92.55% A-paced, 0.05% V paced, 0% A-fib burden.  3 episodes of NSVT, 2 seconds in duration. Normal function, stable thresholds and adequate battery.     5. Primary hypertension-blood pressure is well controlled. Continue losartan and propranolol.  Monitor and record daily blood pressure. Report readings consistently higher than 130/80 or consistently lower than 100/60.     6. Hyperlipidemia LDL goal <70-management per PCP.  LDL outside of goal range at 87 on lipid panel 7/31/24. Atorvastatin dose was increased to 40 mg daily at his last office visit on 12/9/24. Pt states he recently had lab work per his PCP. Will attempt to obtain most recent lipid panel result.     Follow Up   Return in about 6 months (around 12/9/2025) for Next scheduled follow up.  Patient was given instructions and counseling regarding his condition or for health maintenance advice.  Please see specific information pulled into the AVS if appropriate.

## 2025-06-26 LAB
MC_CV_MDC_IDC_RATE_1: 150
MC_CV_MDC_IDC_RATE_1: 171
MC_CV_MDC_IDC_THERAPIES: NORMAL
MC_CV_MDC_IDC_ZONE_ID: 2
MC_CV_MDC_IDC_ZONE_ID: 6
MDC_IDC_MSMT_BATTERY_REMAINING_LONGEVITY: 123 MO
MDC_IDC_MSMT_BATTERY_RRT_TRIGGER: 2.62
MDC_IDC_MSMT_BATTERY_STATUS: NORMAL
MDC_IDC_MSMT_BATTERY_VOLTAGE: 3.03
MDC_IDC_MSMT_LEADCHNL_RA_DTM: NORMAL
MDC_IDC_MSMT_LEADCHNL_RA_IMPEDANCE_VALUE: 418
MDC_IDC_MSMT_LEADCHNL_RA_PACING_THRESHOLD_AMPLITUDE: 0.5
MDC_IDC_MSMT_LEADCHNL_RA_PACING_THRESHOLD_POLARITY: NORMAL
MDC_IDC_MSMT_LEADCHNL_RA_PACING_THRESHOLD_PULSEWIDTH: 0.4
MDC_IDC_MSMT_LEADCHNL_RA_SENSING_INTR_AMPL: 1.38
MDC_IDC_MSMT_LEADCHNL_RV_DTM: NORMAL
MDC_IDC_MSMT_LEADCHNL_RV_IMPEDANCE_VALUE: 494
MDC_IDC_MSMT_LEADCHNL_RV_PACING_THRESHOLD_AMPLITUDE: 0.88
MDC_IDC_MSMT_LEADCHNL_RV_PACING_THRESHOLD_POLARITY: NORMAL
MDC_IDC_MSMT_LEADCHNL_RV_PACING_THRESHOLD_PULSEWIDTH: 0.4
MDC_IDC_MSMT_LEADCHNL_RV_SENSING_INTR_AMPL: 10.5
MDC_IDC_PG_IMPLANT_DTM: NORMAL
MDC_IDC_PG_MFG: NORMAL
MDC_IDC_PG_MODEL: NORMAL
MDC_IDC_PG_SERIAL: NORMAL
MDC_IDC_PG_TYPE: NORMAL
MDC_IDC_SESS_DTM: NORMAL
MDC_IDC_SESS_TYPE: NORMAL
MDC_IDC_SET_BRADY_AT_MODE_SWITCH_RATE: 171
MDC_IDC_SET_BRADY_LOWRATE: 60
MDC_IDC_SET_BRADY_MAX_SENSOR_RATE: 130
MDC_IDC_SET_BRADY_MAX_TRACKING_RATE: 130
MDC_IDC_SET_BRADY_MODE: NORMAL
MDC_IDC_SET_BRADY_PAV_DELAY: 180
MDC_IDC_SET_BRADY_SAV_DELAY: 150
MDC_IDC_SET_LEADCHNL_RA_PACING_AMPLITUDE: 1.5
MDC_IDC_SET_LEADCHNL_RA_PACING_POLARITY: NORMAL
MDC_IDC_SET_LEADCHNL_RA_PACING_PULSEWIDTH: 0.4
MDC_IDC_SET_LEADCHNL_RA_SENSING_POLARITY: NORMAL
MDC_IDC_SET_LEADCHNL_RA_SENSING_SENSITIVITY: 0.3
MDC_IDC_SET_LEADCHNL_RV_PACING_AMPLITUDE: 2
MDC_IDC_SET_LEADCHNL_RV_PACING_POLARITY: NORMAL
MDC_IDC_SET_LEADCHNL_RV_PACING_PULSEWIDTH: 0.4
MDC_IDC_SET_LEADCHNL_RV_SENSING_POLARITY: NORMAL
MDC_IDC_SET_LEADCHNL_RV_SENSING_SENSITIVITY: 0.9
MDC_IDC_SET_ZONE_STATUS: NORMAL
MDC_IDC_SET_ZONE_STATUS: NORMAL
MDC_IDC_SET_ZONE_TYPE: NORMAL
MDC_IDC_SET_ZONE_TYPE: NORMAL
MDC_IDC_STAT_AT_BURDEN_PERCENT: 0
MDC_IDC_STAT_BRADY_RA_PERCENT_PACED: 96.18
MDC_IDC_STAT_BRADY_RV_PERCENT_PACED: 0.06

## 2025-07-27 LAB
MC_CV_MDC_IDC_RATE_1: 150
MC_CV_MDC_IDC_RATE_1: 171
MC_CV_MDC_IDC_THERAPIES: NORMAL
MC_CV_MDC_IDC_ZONE_ID: 2
MC_CV_MDC_IDC_ZONE_ID: 6
MDC_IDC_MSMT_BATTERY_REMAINING_LONGEVITY: 123 MO
MDC_IDC_MSMT_BATTERY_RRT_TRIGGER: 2.62
MDC_IDC_MSMT_BATTERY_STATUS: NORMAL
MDC_IDC_MSMT_BATTERY_VOLTAGE: 3.03
MDC_IDC_MSMT_LEADCHNL_RA_DTM: NORMAL
MDC_IDC_MSMT_LEADCHNL_RA_IMPEDANCE_VALUE: 418
MDC_IDC_MSMT_LEADCHNL_RA_PACING_THRESHOLD_AMPLITUDE: 0.5
MDC_IDC_MSMT_LEADCHNL_RA_PACING_THRESHOLD_POLARITY: NORMAL
MDC_IDC_MSMT_LEADCHNL_RA_PACING_THRESHOLD_PULSEWIDTH: 0.4
MDC_IDC_MSMT_LEADCHNL_RA_SENSING_INTR_AMPL: 1.38
MDC_IDC_MSMT_LEADCHNL_RV_DTM: NORMAL
MDC_IDC_MSMT_LEADCHNL_RV_IMPEDANCE_VALUE: 494
MDC_IDC_MSMT_LEADCHNL_RV_PACING_THRESHOLD_AMPLITUDE: 0.88
MDC_IDC_MSMT_LEADCHNL_RV_PACING_THRESHOLD_POLARITY: NORMAL
MDC_IDC_MSMT_LEADCHNL_RV_PACING_THRESHOLD_PULSEWIDTH: 0.4
MDC_IDC_MSMT_LEADCHNL_RV_SENSING_INTR_AMPL: 10.5
MDC_IDC_PG_IMPLANT_DTM: NORMAL
MDC_IDC_PG_MFG: NORMAL
MDC_IDC_PG_MODEL: NORMAL
MDC_IDC_PG_SERIAL: NORMAL
MDC_IDC_PG_TYPE: NORMAL
MDC_IDC_SESS_DTM: NORMAL
MDC_IDC_SESS_TYPE: NORMAL
MDC_IDC_SET_BRADY_AT_MODE_SWITCH_RATE: 171
MDC_IDC_SET_BRADY_LOWRATE: 60
MDC_IDC_SET_BRADY_MAX_SENSOR_RATE: 130
MDC_IDC_SET_BRADY_MAX_TRACKING_RATE: 130
MDC_IDC_SET_BRADY_MODE: NORMAL
MDC_IDC_SET_BRADY_PAV_DELAY: 180
MDC_IDC_SET_BRADY_SAV_DELAY: 150
MDC_IDC_SET_LEADCHNL_RA_PACING_AMPLITUDE: 1.5
MDC_IDC_SET_LEADCHNL_RA_PACING_POLARITY: NORMAL
MDC_IDC_SET_LEADCHNL_RA_PACING_PULSEWIDTH: 0.4
MDC_IDC_SET_LEADCHNL_RA_SENSING_POLARITY: NORMAL
MDC_IDC_SET_LEADCHNL_RA_SENSING_SENSITIVITY: 0.3
MDC_IDC_SET_LEADCHNL_RV_PACING_AMPLITUDE: 2
MDC_IDC_SET_LEADCHNL_RV_PACING_POLARITY: NORMAL
MDC_IDC_SET_LEADCHNL_RV_PACING_PULSEWIDTH: 0.4
MDC_IDC_SET_LEADCHNL_RV_SENSING_POLARITY: NORMAL
MDC_IDC_SET_LEADCHNL_RV_SENSING_SENSITIVITY: 0.9
MDC_IDC_SET_ZONE_STATUS: NORMAL
MDC_IDC_SET_ZONE_STATUS: NORMAL
MDC_IDC_SET_ZONE_TYPE: NORMAL
MDC_IDC_SET_ZONE_TYPE: NORMAL
MDC_IDC_STAT_AT_BURDEN_PERCENT: 0
MDC_IDC_STAT_BRADY_RA_PERCENT_PACED: 96.18
MDC_IDC_STAT_BRADY_RV_PERCENT_PACED: 0.06

## (undated) DEVICE — PATIENT RETURN ELECTRODE, SINGLE-USE, CONTACT QUALITY MONITORING, ADULT, WITH 9FT CORD, FOR PATIENTS WEIGING OVER 33LBS. (15KG): Brand: MEGADYNE

## (undated) DEVICE — SOL NS 500ML

## (undated) DEVICE — PAD, DEFIB, ADULT, RADIOTRANS, PHYSIO: Brand: MEDLINE

## (undated) DEVICE — TBG PENCL TELESCP MEGADYNE SMOKE EVAC 10FT

## (undated) DEVICE — SOL IRR NACL 0.9PCT BT 1000ML

## (undated) DEVICE — CABL BIPOL W/ALLGTR CLIP/SM 12FT

## (undated) DEVICE — PK PM 30

## (undated) DEVICE — 3M™ IOBAN™ 2 ANTIMICROBIAL INCISE DRAPE 6650EZ: Brand: IOBAN™ 2

## (undated) DEVICE — SPNG GZ WOVN 4X4IN 12PLY 10/BX STRL